# Patient Record
Sex: FEMALE | Race: BLACK OR AFRICAN AMERICAN | NOT HISPANIC OR LATINO | Employment: OTHER | URBAN - METROPOLITAN AREA
[De-identification: names, ages, dates, MRNs, and addresses within clinical notes are randomized per-mention and may not be internally consistent; named-entity substitution may affect disease eponyms.]

---

## 2017-04-12 ENCOUNTER — HISTORICAL (OUTPATIENT)
Dept: RADIOLOGY | Facility: HOSPITAL | Age: 72
End: 2017-04-12

## 2018-01-22 ENCOUNTER — HISTORICAL (OUTPATIENT)
Dept: ADMINISTRATIVE | Facility: HOSPITAL | Age: 73
End: 2018-01-22

## 2019-01-24 ENCOUNTER — HISTORICAL (OUTPATIENT)
Dept: RADIOLOGY | Facility: HOSPITAL | Age: 74
End: 2019-01-24

## 2019-01-24 ENCOUNTER — HISTORICAL (OUTPATIENT)
Dept: ADMINISTRATIVE | Facility: HOSPITAL | Age: 74
End: 2019-01-24

## 2019-01-24 LAB
ALBUMIN SERPL-MCNC: 4.5 G/DL (ref 3.5–4.8)
ALBUMIN/GLOB SERPL: 1.8 {RATIO} (ref 1.2–2.2)
ALP SERPL-CCNC: 59 IU/L (ref 39–117)
ALT SERPL-CCNC: 13 IU/L (ref 0–32)
AST SERPL-CCNC: 15 IU/L (ref 0–40)
BASOPHILS # BLD AUTO: 0 X10E3/UL (ref 0–0.2)
BASOPHILS NFR BLD AUTO: 0 %
BILIRUB SERPL-MCNC: 0.3 MG/DL (ref 0–1.2)
BUN SERPL-MCNC: 14 MG/DL (ref 8–27)
CALCIUM SERPL-MCNC: 10.4 MG/DL (ref 8.7–10.3)
CHLORIDE SERPL-SCNC: 104 MMOL/L (ref 96–106)
CHOLEST SERPL-MCNC: 122 MG/DL (ref 100–199)
CHOLEST/HDLC SERPL: 2.7 RATIO (ref 0–4.4)
CO2 SERPL-SCNC: 28 MMOL/L (ref 20–29)
CREAT SERPL-MCNC: 0.94 MG/DL (ref 0.57–1)
CREAT/UREA NIT SERPL: 15 (ref 12–28)
EOSINOPHIL # BLD AUTO: 0.1 X10E3/UL (ref 0–0.4)
EOSINOPHIL NFR BLD AUTO: 1 %
ERYTHROCYTE [DISTWIDTH] IN BLOOD BY AUTOMATED COUNT: 12.8 % (ref 12.3–15.4)
GLOBULIN SER-MCNC: 2.5 G/DL (ref 1.5–4.5)
GLUCOSE SERPL-MCNC: 158 MG/DL (ref 65–99)
HBA1C MFR BLD: 7.2 % (ref 4.8–5.6)
HCT VFR BLD AUTO: 39.2 % (ref 34–46.6)
HDLC SERPL-MCNC: 45 MG/DL
HGB BLD-MCNC: 12.7 G/DL (ref 11.1–15.9)
LDLC SERPL CALC-MCNC: 56 MG/DL (ref 0–99)
LYMPHOCYTES # BLD AUTO: 2.1 X10E3/UL (ref 0.7–3.1)
LYMPHOCYTES NFR BLD AUTO: 30 %
MCH RBC QN AUTO: 29.5 PG (ref 26.6–33)
MCHC RBC AUTO-ENTMCNC: 32.4 G/DL (ref 31.5–35.7)
MCV RBC AUTO: 91 FL (ref 79–97)
MONOCYTES # BLD AUTO: 0.4 X10E3/UL (ref 0.1–0.9)
MONOCYTES NFR BLD AUTO: 6 %
NEUTROPHILS # BLD AUTO: 4.5 X10E3/UL (ref 1.4–7)
NEUTROPHILS NFR BLD AUTO: 63 %
PLATELET # BLD AUTO: 313 X10E3/UL (ref 150–379)
POTASSIUM SERPL-SCNC: 4.9 MMOL/L (ref 3.5–5.2)
PROT SERPL-MCNC: 7 G/DL (ref 6–8.5)
RBC # BLD AUTO: 4.31 X10(6)/MCL (ref 3.77–5.28)
SODIUM SERPL-SCNC: 145 MMOL/L (ref 134–144)
TRIGL SERPL-MCNC: 107 MG/DL (ref 0–149)
URATE SERPL-MCNC: 5.4 MG/DL (ref 2.5–7.1)
VLDLC SERPL CALC-MCNC: 21 MG/DL (ref 5–40)
WBC # SPEC AUTO: 7.1 X10E3/UL (ref 3.4–10.8)

## 2019-08-06 ENCOUNTER — HISTORICAL (OUTPATIENT)
Dept: ADMINISTRATIVE | Facility: HOSPITAL | Age: 74
End: 2019-08-06

## 2019-08-06 LAB
ALBUMIN SERPL-MCNC: 5 G/DL (ref 3.5–4.8)
ALBUMIN/GLOB SERPL: 2 {RATIO} (ref 1.2–2.2)
ALP SERPL-CCNC: 70 IU/L (ref 39–117)
ALT SERPL-CCNC: 10 IU/L (ref 0–32)
AST SERPL-CCNC: 13 IU/L (ref 0–40)
BILIRUB SERPL-MCNC: 0.4 MG/DL (ref 0–1.2)
BUN SERPL-MCNC: 18 MG/DL (ref 8–27)
CALCIUM SERPL-MCNC: 10.3 MG/DL (ref 8.7–10.3)
CHLORIDE SERPL-SCNC: 102 MMOL/L (ref 96–106)
CO2 SERPL-SCNC: 27 MMOL/L (ref 20–29)
CREAT SERPL-MCNC: 0.85 MG/DL (ref 0.57–1)
CREAT/UREA NIT SERPL: 21 (ref 12–28)
GLOBULIN SER-MCNC: 2.5 G/DL (ref 1.5–4.5)
GLUCOSE SERPL-MCNC: 160 MG/DL (ref 65–99)
HBA1C MFR BLD: 7.8 % (ref 4.8–5.6)
MICROALBUMIN/CREAT RATIO PNL UR: <7.8 MG/G CREAT (ref 0–30)
POTASSIUM SERPL-SCNC: 4.4 MMOL/L (ref 3.5–5.2)
PROT SERPL-MCNC: 7.5 G/DL (ref 6–8.5)
SODIUM SERPL-SCNC: 145 MMOL/L (ref 134–144)

## 2019-08-26 ENCOUNTER — HISTORICAL (OUTPATIENT)
Dept: RADIOLOGY | Facility: HOSPITAL | Age: 74
End: 2019-08-26

## 2020-02-10 ENCOUNTER — HISTORICAL (OUTPATIENT)
Dept: ADMINISTRATIVE | Facility: HOSPITAL | Age: 75
End: 2020-02-10

## 2020-02-10 LAB
ALBUMIN SERPL-MCNC: 4.8 G/DL (ref 3.7–4.7)
ALBUMIN/GLOB SERPL: 2 {RATIO} (ref 1.2–2.2)
ALP SERPL-CCNC: 67 IU/L (ref 39–117)
ALT SERPL-CCNC: 14 IU/L (ref 0–32)
AST SERPL-CCNC: 15 IU/L (ref 0–40)
BILIRUB SERPL-MCNC: 0.3 MG/DL (ref 0–1.2)
BUN SERPL-MCNC: 12 MG/DL (ref 8–27)
CALCIUM SERPL-MCNC: 10.3 MG/DL (ref 8.7–10.3)
CHLORIDE SERPL-SCNC: 101 MMOL/L (ref 96–106)
CHOLEST SERPL-MCNC: 118 MG/DL (ref 100–199)
CHOLEST/HDLC SERPL: 2.4 RATIO (ref 0–4.4)
CO2 SERPL-SCNC: 21 MMOL/L (ref 20–29)
CREAT SERPL-MCNC: 0.86 MG/DL (ref 0.57–1)
CREAT/UREA NIT SERPL: 14 (ref 12–28)
GLOBULIN SER-MCNC: 2.4 G/DL (ref 1.5–4.5)
GLUCOSE SERPL-MCNC: 233 MG/DL (ref 65–99)
HBA1C MFR BLD: 7.9 % (ref 4.8–5.6)
HDLC SERPL-MCNC: 49 MG/DL
LDLC SERPL CALC-MCNC: 45 MG/DL (ref 0–99)
POTASSIUM SERPL-SCNC: 4.5 MMOL/L (ref 3.5–5.2)
PROT SERPL-MCNC: 7.2 G/DL (ref 6–8.5)
SODIUM SERPL-SCNC: 141 MMOL/L (ref 134–144)
TRIGL SERPL-MCNC: 122 MG/DL (ref 0–149)
VLDLC SERPL CALC-MCNC: 24 MG/DL (ref 5–40)

## 2020-08-11 ENCOUNTER — HISTORICAL (OUTPATIENT)
Dept: ADMINISTRATIVE | Facility: HOSPITAL | Age: 75
End: 2020-08-11

## 2020-08-11 LAB
ALBUMIN SERPL-MCNC: 4.8 G/DL (ref 3.7–4.7)
ALBUMIN/GLOB SERPL: 2 {RATIO} (ref 1.2–2.2)
ALP SERPL-CCNC: 62 IU/L (ref 39–117)
ALT SERPL-CCNC: 12 IU/L (ref 0–32)
AST SERPL-CCNC: 15 IU/L (ref 0–40)
BILIRUB SERPL-MCNC: 0.3 MG/DL (ref 0–1.2)
BUN SERPL-MCNC: 11 MG/DL (ref 8–27)
CALCIUM SERPL-MCNC: 10.3 MG/DL (ref 8.7–10.3)
CHLORIDE SERPL-SCNC: 101 MMOL/L (ref 96–106)
CO2 SERPL-SCNC: 24 MMOL/L (ref 20–29)
CREAT SERPL-MCNC: 0.88 MG/DL (ref 0.57–1)
CREAT/UREA NIT SERPL: 13 (ref 12–28)
GLOBULIN SER-MCNC: 2.4 G/DL (ref 1.5–4.5)
GLUCOSE SERPL-MCNC: 138 MG/DL (ref 65–99)
HBA1C MFR BLD: 7.5 % (ref 4.8–5.6)
POTASSIUM SERPL-SCNC: 4.6 MMOL/L (ref 3.5–5.2)
PROT SERPL-MCNC: 7.2 G/DL (ref 6–8.5)
SODIUM SERPL-SCNC: 140 MMOL/L (ref 134–144)

## 2020-11-12 ENCOUNTER — HISTORICAL (OUTPATIENT)
Dept: ADMINISTRATIVE | Facility: HOSPITAL | Age: 75
End: 2020-11-12

## 2020-11-12 LAB — HBA1C MFR BLD: 7 % (ref 4.8–5.6)

## 2020-12-04 LAB — CRC RECOMMENDATION EXT: NORMAL

## 2021-03-11 ENCOUNTER — HISTORICAL (OUTPATIENT)
Dept: ADMINISTRATIVE | Facility: HOSPITAL | Age: 76
End: 2021-03-11

## 2021-03-11 LAB
EST. AVERAGE GLUCOSE BLD GHB EST-MCNC: 177.2 MG/DL
HBA1C MFR BLD: 7.8 %

## 2021-06-14 ENCOUNTER — HISTORICAL (OUTPATIENT)
Dept: ADMINISTRATIVE | Facility: HOSPITAL | Age: 76
End: 2021-06-14

## 2021-06-14 LAB
BUN SERPL-MCNC: 12.4 MG/DL (ref 9.8–20.1)
CALCIUM SERPL-MCNC: 9.5 MG/DL (ref 8.4–10.2)
CHLORIDE SERPL-SCNC: 102 MMOL/L (ref 98–107)
CO2 SERPL-SCNC: 24 MMOL/L (ref 23–31)
CREAT SERPL-MCNC: 0.79 MG/DL (ref 0.55–1.02)
CREAT/UREA NIT SERPL: 16
EST. AVERAGE GLUCOSE BLD GHB EST-MCNC: 165.7 MG/DL
GLUCOSE SERPL-MCNC: 57 MG/DL (ref 82–115)
HBA1C MFR BLD: 7.4 %
POTASSIUM SERPL-SCNC: 3.9 MMOL/L (ref 3.5–5.1)
SODIUM SERPL-SCNC: 139 MMOL/L (ref 136–145)

## 2021-08-16 ENCOUNTER — HISTORICAL (OUTPATIENT)
Dept: ADMINISTRATIVE | Facility: HOSPITAL | Age: 76
End: 2021-08-16

## 2021-08-16 LAB
ALBUMIN SERPL-MCNC: 4.8 G/DL (ref 3.7–4.7)
ALBUMIN/GLOB SERPL: 1.9 {RATIO} (ref 1.2–2.2)
ALP SERPL-CCNC: 95 IU/L (ref 48–121)
ALT SERPL-CCNC: 46 IU/L (ref 0–32)
AST SERPL-CCNC: 20 IU/L (ref 0–40)
BILIRUB SERPL-MCNC: 0.2 MG/DL (ref 0–1.2)
BUN SERPL-MCNC: 11 MG/DL (ref 8–27)
CALCIUM SERPL-MCNC: 9.8 MG/DL (ref 8.7–10.3)
CHLORIDE SERPL-SCNC: 102 MMOL/L (ref 96–106)
CHOLEST SERPL-MCNC: 163 MG/DL (ref 100–199)
CHOLEST/HDLC SERPL: 3.3 RATIO (ref 0–4.4)
CO2 SERPL-SCNC: 22 MMOL/L (ref 20–29)
CREAT SERPL-MCNC: 0.8 MG/DL (ref 0.57–1)
CREAT/UREA NIT SERPL: 14 (ref 12–28)
GLOBULIN SER-MCNC: 2.5 G/DL (ref 1.5–4.5)
GLUCOSE SERPL-MCNC: 113 MG/DL (ref 65–99)
HDLC SERPL-MCNC: 50 MG/DL
LDLC SERPL CALC-MCNC: 78 MG/DL (ref 0–99)
POTASSIUM SERPL-SCNC: 4.8 MMOL/L (ref 3.5–5.2)
PROT SERPL-MCNC: 7.3 G/DL (ref 6–8.5)
SODIUM SERPL-SCNC: 140 MMOL/L (ref 134–144)
TRIGL SERPL-MCNC: 211 MG/DL (ref 0–149)
VLDLC SERPL CALC-MCNC: 35 MG/DL (ref 5–40)

## 2021-08-19 ENCOUNTER — HISTORICAL (OUTPATIENT)
Dept: RADIOLOGY | Facility: HOSPITAL | Age: 76
End: 2021-08-19

## 2021-09-02 ENCOUNTER — HISTORICAL (OUTPATIENT)
Dept: RADIOLOGY | Facility: HOSPITAL | Age: 76
End: 2021-09-02

## 2021-10-20 ENCOUNTER — HISTORICAL (OUTPATIENT)
Dept: ADMINISTRATIVE | Facility: HOSPITAL | Age: 76
End: 2021-10-20

## 2021-10-20 LAB
ALBUMIN SERPL-MCNC: 4.7 G/DL (ref 3.7–4.7)
ALBUMIN/GLOB SERPL: 2 {RATIO} (ref 1.2–2.2)
ALP SERPL-CCNC: 69 IU/L (ref 44–121)
ALT SERPL-CCNC: 18 IU/L (ref 0–32)
AST SERPL-CCNC: 15 IU/L (ref 0–40)
BASOPHILS # BLD AUTO: 0 X10E3/UL (ref 0–0.2)
BASOPHILS NFR BLD AUTO: 1 %
BILIRUB SERPL-MCNC: 0.2 MG/DL (ref 0–1.2)
BUN SERPL-MCNC: 15 MG/DL (ref 8–27)
CALCIUM SERPL-MCNC: 10.1 MG/DL (ref 8.7–10.3)
CHLORIDE SERPL-SCNC: 102 MMOL/L (ref 96–106)
CO2 SERPL-SCNC: 22 MMOL/L (ref 20–29)
CREAT SERPL-MCNC: 0.85 MG/DL (ref 0.57–1)
CREAT/UREA NIT SERPL: 18 (ref 12–28)
EOSINOPHIL # BLD AUTO: 0.1 X10E3/UL (ref 0–0.4)
EOSINOPHIL NFR BLD AUTO: 1 %
ERYTHROCYTE [DISTWIDTH] IN BLOOD BY AUTOMATED COUNT: 13.3 % (ref 11.7–15.4)
GLOBULIN SER-MCNC: 2.4 G/DL (ref 1.5–4.5)
GLUCOSE SERPL-MCNC: 152 MG/DL (ref 65–99)
HCT VFR BLD AUTO: 38.5 % (ref 34–46.6)
HGB BLD-MCNC: 12.7 G/DL (ref 11.1–15.9)
LYMPHOCYTES # BLD AUTO: 2.5 X10E3/UL (ref 0.7–3.1)
LYMPHOCYTES NFR BLD AUTO: 39 %
MCH RBC QN AUTO: 29.7 PG (ref 26.6–33)
MCHC RBC AUTO-ENTMCNC: 33 G/DL (ref 31.5–35.7)
MCV RBC AUTO: 90 FL (ref 79–97)
MONOCYTES # BLD AUTO: 0.3 X10E3/UL (ref 0.1–0.9)
MONOCYTES NFR BLD AUTO: 5 %
NEUTROPHILS # BLD AUTO: 3.6 X10E3/UL (ref 1.4–7)
NEUTROPHILS NFR BLD AUTO: 54 %
PLATELET # BLD AUTO: 283 X10E3/UL (ref 150–450)
POTASSIUM SERPL-SCNC: 4.4 MMOL/L (ref 3.5–5.2)
PROT SERPL-MCNC: 7.1 G/DL (ref 6–8.5)
RBC # BLD AUTO: 4.27 X10(6)/MCL (ref 3.77–5.28)
SODIUM SERPL-SCNC: 140 MMOL/L (ref 134–144)
TSH SERPL-ACNC: 2.03 MIU/ML (ref 0.45–4.5)
WBC # SPEC AUTO: 6.5 X10E3/UL (ref 3.4–10.8)

## 2021-10-29 ENCOUNTER — HISTORICAL (OUTPATIENT)
Dept: RADIOLOGY | Facility: HOSPITAL | Age: 76
End: 2021-10-29

## 2022-01-11 ENCOUNTER — HISTORICAL (OUTPATIENT)
Dept: ADMINISTRATIVE | Facility: HOSPITAL | Age: 77
End: 2022-01-11

## 2022-01-11 LAB — HBA1C MFR BLD: 8 % (ref 4.8–5.6)

## 2022-02-16 ENCOUNTER — HISTORICAL (OUTPATIENT)
Dept: LAB | Facility: HOSPITAL | Age: 77
End: 2022-02-16

## 2022-02-16 LAB
ALBUMIN SERPL-MCNC: 4.2 G/DL (ref 3.4–4.8)
ALP SERPL-CCNC: 66 U/L (ref 40–150)
ALT SERPL-CCNC: 23 U/L (ref 0–55)
AST SERPL-CCNC: 15 U/L (ref 5–34)
BILIRUB SERPL-MCNC: 0.4 MG/DL
BILIRUBIN DIRECT+TOT PNL SERPL-MCNC: 0.1 (ref 0–0.5)
BILIRUBIN DIRECT+TOT PNL SERPL-MCNC: 0.3 (ref 0–0.8)
CHOLEST SERPL-MCNC: 147 MG/DL
CHOLEST/HDLC SERPL: 3 {RATIO} (ref 0–5)
HDLC SERPL-MCNC: 49 MG/DL (ref 35–60)
HEMOLYSIS INTERF INDEX SERPL-ACNC: 5
ICTERIC INTERF INDEX SERPL-ACNC: 0
LDLC SERPL CALC-MCNC: 61 MG/DL (ref 50–140)
LIPEMIC INTERF INDEX SERPL-ACNC: 1
PROT SERPL-MCNC: 7.4 G/DL (ref 5.8–7.6)
TRIGL SERPL-MCNC: 185 MG/DL (ref 37–140)
VLDLC SERPL CALC-MCNC: 37 MG/DL

## 2022-04-10 ENCOUNTER — HISTORICAL (OUTPATIENT)
Dept: ADMINISTRATIVE | Facility: HOSPITAL | Age: 77
End: 2022-04-10
Payer: MEDICARE

## 2022-04-29 VITALS
OXYGEN SATURATION: 98 % | BODY MASS INDEX: 30.09 KG/M2 | WEIGHT: 159.38 LBS | DIASTOLIC BLOOD PRESSURE: 78 MMHG | SYSTOLIC BLOOD PRESSURE: 132 MMHG | HEIGHT: 61 IN

## 2022-06-06 PROBLEM — U07.1 COVID-19: Status: ACTIVE | Noted: 2020-07-08

## 2022-06-06 PROBLEM — E78.00 HYPERCHOLESTEROLEMIA: Status: ACTIVE | Noted: 2022-06-06

## 2022-06-06 PROBLEM — I10 HYPERTENSION: Status: ACTIVE | Noted: 2022-06-06

## 2022-06-06 PROBLEM — M81.0 OSTEOPOROSIS: Status: ACTIVE | Noted: 2022-06-06

## 2022-06-15 ENCOUNTER — DOCUMENTATION ONLY (OUTPATIENT)
Dept: ADMINISTRATIVE | Facility: HOSPITAL | Age: 77
End: 2022-06-15
Payer: MEDICARE

## 2022-08-16 ENCOUNTER — OFFICE VISIT (OUTPATIENT)
Dept: NEUROLOGY | Facility: CLINIC | Age: 77
End: 2022-08-16
Payer: MEDICARE

## 2022-08-16 ENCOUNTER — TELEPHONE (OUTPATIENT)
Dept: PRIMARY CARE CLINIC | Facility: CLINIC | Age: 77
End: 2022-08-16
Payer: MEDICARE

## 2022-08-16 VITALS
DIASTOLIC BLOOD PRESSURE: 80 MMHG | HEART RATE: 70 BPM | SYSTOLIC BLOOD PRESSURE: 124 MMHG | HEIGHT: 61 IN | BODY MASS INDEX: 31.68 KG/M2 | WEIGHT: 167.81 LBS

## 2022-08-16 DIAGNOSIS — F03.90 DEMENTIA WITHOUT BEHAVIORAL DISTURBANCE, UNSPECIFIED DEMENTIA TYPE: ICD-10-CM

## 2022-08-16 PROBLEM — R41.3 POOR SHORT TERM MEMORY: Status: ACTIVE | Noted: 2022-08-16

## 2022-08-16 PROCEDURE — 99204 PR OFFICE/OUTPT VISIT, NEW, LEVL IV, 45-59 MIN: ICD-10-PCS | Mod: S$GLB,,, | Performed by: NURSE PRACTITIONER

## 2022-08-16 PROCEDURE — 3074F SYST BP LT 130 MM HG: CPT | Mod: CPTII,S$GLB,, | Performed by: NURSE PRACTITIONER

## 2022-08-16 PROCEDURE — 3074F PR MOST RECENT SYSTOLIC BLOOD PRESSURE < 130 MM HG: ICD-10-PCS | Mod: CPTII,S$GLB,, | Performed by: NURSE PRACTITIONER

## 2022-08-16 PROCEDURE — 99999 PR PBB SHADOW E&M-EST. PATIENT-LVL III: ICD-10-PCS | Mod: PBBFAC,,, | Performed by: PSYCHIATRY & NEUROLOGY

## 2022-08-16 PROCEDURE — 3288F PR FALLS RISK ASSESSMENT DOCUMENTED: ICD-10-PCS | Mod: CPTII,S$GLB,, | Performed by: NURSE PRACTITIONER

## 2022-08-16 PROCEDURE — 1159F MED LIST DOCD IN RCRD: CPT | Mod: CPTII,S$GLB,, | Performed by: NURSE PRACTITIONER

## 2022-08-16 PROCEDURE — 3079F DIAST BP 80-89 MM HG: CPT | Mod: CPTII,S$GLB,, | Performed by: NURSE PRACTITIONER

## 2022-08-16 PROCEDURE — 3288F FALL RISK ASSESSMENT DOCD: CPT | Mod: CPTII,S$GLB,, | Performed by: NURSE PRACTITIONER

## 2022-08-16 PROCEDURE — 1159F PR MEDICATION LIST DOCUMENTED IN MEDICAL RECORD: ICD-10-PCS | Mod: CPTII,S$GLB,, | Performed by: NURSE PRACTITIONER

## 2022-08-16 PROCEDURE — 99999 PR PBB SHADOW E&M-EST. PATIENT-LVL III: CPT | Mod: PBBFAC,,, | Performed by: PSYCHIATRY & NEUROLOGY

## 2022-08-16 PROCEDURE — 1160F PR REVIEW ALL MEDS BY PRESCRIBER/CLIN PHARMACIST DOCUMENTED: ICD-10-PCS | Mod: CPTII,S$GLB,, | Performed by: NURSE PRACTITIONER

## 2022-08-16 PROCEDURE — 1160F RVW MEDS BY RX/DR IN RCRD: CPT | Mod: CPTII,S$GLB,, | Performed by: NURSE PRACTITIONER

## 2022-08-16 PROCEDURE — 1101F PT FALLS ASSESS-DOCD LE1/YR: CPT | Mod: CPTII,S$GLB,, | Performed by: NURSE PRACTITIONER

## 2022-08-16 PROCEDURE — 1101F PR PT FALLS ASSESS DOC 0-1 FALLS W/OUT INJ PAST YR: ICD-10-PCS | Mod: CPTII,S$GLB,, | Performed by: NURSE PRACTITIONER

## 2022-08-16 PROCEDURE — 3079F PR MOST RECENT DIASTOLIC BLOOD PRESSURE 80-89 MM HG: ICD-10-PCS | Mod: CPTII,S$GLB,, | Performed by: NURSE PRACTITIONER

## 2022-08-16 PROCEDURE — 99204 OFFICE O/P NEW MOD 45 MIN: CPT | Mod: S$GLB,,, | Performed by: NURSE PRACTITIONER

## 2022-08-16 RX ORDER — DONEPEZIL HYDROCHLORIDE 5 MG/1
TABLET, FILM COATED ORAL
Qty: 30 TABLET | Refills: 5 | Status: SHIPPED | OUTPATIENT
Start: 2022-08-16 | End: 2022-10-24

## 2022-08-16 RX ORDER — ROSUVASTATIN CALCIUM 20 MG/1
20 TABLET, COATED ORAL DAILY
COMMUNITY
Start: 2022-03-23 | End: 2024-02-28 | Stop reason: SDUPTHER

## 2022-08-16 RX ORDER — AMLODIPINE BESYLATE 10 MG/1
10 TABLET ORAL DAILY
COMMUNITY
Start: 2022-04-28 | End: 2024-02-28 | Stop reason: SDUPTHER

## 2022-08-16 RX ORDER — METFORMIN HYDROCHLORIDE 850 MG/1
1 TABLET ORAL 2 TIMES DAILY
COMMUNITY
Start: 2021-11-26 | End: 2022-09-12

## 2022-08-16 NOTE — ASSESSMENT & PLAN NOTE
Start aricept  Agree with resuming B12 supplementation  Diet, exercise, and social engagement encourage  Recommend she retire from driving which was discussed

## 2022-08-16 NOTE — LETTER
August 16, 2022    Demetrice Barrett  [address] [address]             [facility] [address], [address]  [facility] [address]  Phone: [phone] Dear Ms. Barrett:    It is my medical recommendation that Ms. Barrett retired from driving due to her cognitive impairment.    If you have any questions or concerns, please don't hesitate to call. [phone]    Sincerely,        Paty Marcelino MD

## 2022-08-16 NOTE — TELEPHONE ENCOUNTER
----- Message from Perry Crouch sent at 8/16/2022  1:59 PM CDT -----  .Type:  Needs Medical Advice    Who Called: pt's Daughter  Would the patient rather a call back or a response via [facility]? Call back  Best Call Back Number: [phone]  Additional Information: call back to discuss to verify some information,
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
## 2022-08-17 ENCOUNTER — OFFICE VISIT (OUTPATIENT)
Dept: PRIMARY CARE CLINIC | Facility: CLINIC | Age: 77
End: 2022-08-17
Payer: MEDICARE

## 2022-08-17 VITALS
BODY MASS INDEX: 30.96 KG/M2 | DIASTOLIC BLOOD PRESSURE: 70 MMHG | OXYGEN SATURATION: 98 % | SYSTOLIC BLOOD PRESSURE: 110 MMHG | WEIGHT: 164 LBS | RESPIRATION RATE: 16 BRPM | HEIGHT: 61 IN | TEMPERATURE: 98 F | HEART RATE: 81 BPM

## 2022-08-17 DIAGNOSIS — M81.0 AGE-RELATED OSTEOPOROSIS WITHOUT CURRENT PATHOLOGICAL FRACTURE: ICD-10-CM

## 2022-08-17 DIAGNOSIS — I10 ESSENTIAL HYPERTENSION: ICD-10-CM

## 2022-08-17 DIAGNOSIS — Z71.89 ADVANCE CARE PLANNING: ICD-10-CM

## 2022-08-17 DIAGNOSIS — E11.65 UNCONTROLLED TYPE 2 DIABETES MELLITUS WITH HYPERGLYCEMIA: ICD-10-CM

## 2022-08-17 DIAGNOSIS — R53.82 CHRONIC FATIGUE: ICD-10-CM

## 2022-08-17 DIAGNOSIS — Z00.00 MEDICARE ANNUAL WELLNESS VISIT, SUBSEQUENT: Primary | ICD-10-CM

## 2022-08-17 DIAGNOSIS — R82.81 PYURIA: ICD-10-CM

## 2022-08-17 DIAGNOSIS — E78.00 HYPERCHOLESTEROLEMIA: ICD-10-CM

## 2022-08-17 PROBLEM — U07.1 COVID-19: Status: RESOLVED | Noted: 2020-07-08 | Resolved: 2022-08-17

## 2022-08-17 LAB
ALBUMIN SERPL-MCNC: 4.1 GM/DL (ref 3.4–4.8)
ALBUMIN/GLOB SERPL: 1.4 RATIO (ref 1.1–2)
ALP SERPL-CCNC: 65 UNIT/L (ref 40–150)
ALT SERPL-CCNC: 12 UNIT/L (ref 0–55)
APPEARANCE UR: ABNORMAL
AST SERPL-CCNC: 14 UNIT/L (ref 5–34)
BACTERIA #/AREA URNS AUTO: ABNORMAL /HPF
BASOPHILS # BLD AUTO: 0.02 X10(3)/MCL (ref 0–0.2)
BASOPHILS NFR BLD AUTO: 0.3 %
BILIRUB UR QL STRIP.AUTO: NEGATIVE MG/DL
BILIRUBIN DIRECT+TOT PNL SERPL-MCNC: 0.4 MG/DL
BUN SERPL-MCNC: 14.8 MG/DL (ref 9.8–20.1)
CALCIUM SERPL-MCNC: 9.8 MG/DL (ref 8.4–10.2)
CHLORIDE SERPL-SCNC: 103 MMOL/L (ref 98–107)
CO2 SERPL-SCNC: 27 MMOL/L (ref 23–31)
COLOR UR AUTO: YELLOW
CREAT SERPL-MCNC: 0.84 MG/DL (ref 0.55–1.02)
EOSINOPHIL # BLD AUTO: 0.04 X10(3)/MCL (ref 0–0.9)
EOSINOPHIL NFR BLD AUTO: 0.5 %
ERYTHROCYTE [DISTWIDTH] IN BLOOD BY AUTOMATED COUNT: 13.2 % (ref 11.5–17)
EST. AVERAGE GLUCOSE BLD GHB EST-MCNC: 177.2 MG/DL
GFR SERPLBLD CREATININE-BSD FMLA CKD-EPI: >60 MLS/MIN/1.73/M2
GLOBULIN SER-MCNC: 3 GM/DL (ref 2.4–3.5)
GLUCOSE SERPL-MCNC: 111 MG/DL (ref 82–115)
GLUCOSE UR QL STRIP.AUTO: NEGATIVE MG/DL
HBA1C MFR BLD: 7.8 %
HCT VFR BLD AUTO: 37.6 % (ref 37–47)
HGB BLD-MCNC: 12.3 GM/DL (ref 12–16)
HYALINE CASTS URNS QL MICRO: ABNORMAL /HPF
IMM GRANULOCYTES # BLD AUTO: 0.01 X10(3)/MCL (ref 0–0.04)
IMM GRANULOCYTES NFR BLD AUTO: 0.1 %
KETONES UR QL STRIP.AUTO: ABNORMAL MG/DL
LEUKOCYTE ESTERASE UR QL STRIP.AUTO: ABNORMAL UNIT/L
LYMPHOCYTES # BLD AUTO: 2.36 X10(3)/MCL (ref 0.6–4.6)
LYMPHOCYTES NFR BLD AUTO: 30.3 %
MCH RBC QN AUTO: 29.4 PG (ref 27–31)
MCHC RBC AUTO-ENTMCNC: 32.7 MG/DL (ref 33–36)
MCV RBC AUTO: 90 FL (ref 80–94)
MONOCYTES # BLD AUTO: 0.37 X10(3)/MCL (ref 0.1–1.3)
MONOCYTES NFR BLD AUTO: 4.7 %
MUCOUS THREADS URNS QL MICRO: ABNORMAL /LPF
NEUTROPHILS # BLD AUTO: 5 X10(3)/MCL (ref 2.1–9.2)
NEUTROPHILS NFR BLD AUTO: 64.1 %
NITRITE UR QL STRIP.AUTO: NEGATIVE
PH UR STRIP.AUTO: 6 [PH]
PLATELET # BLD AUTO: 285 X10(3)/MCL (ref 130–400)
PMV BLD AUTO: 10.4 FL (ref 7.4–10.4)
POTASSIUM SERPL-SCNC: 4.1 MMOL/L (ref 3.5–5.1)
PROT SERPL-MCNC: 7.1 GM/DL (ref 5.8–7.6)
PROT UR QL STRIP.AUTO: NEGATIVE MG/DL
RBC # BLD AUTO: 4.18 X10(6)/MCL (ref 4.2–5.4)
RBC #/AREA URNS AUTO: ABNORMAL /HPF
RBC UR QL AUTO: NEGATIVE UNIT/L
SODIUM SERPL-SCNC: 140 MMOL/L (ref 136–145)
SP GR UR STRIP.AUTO: 1.02
SQUAMOUS #/AREA URNS AUTO: ABNORMAL /HPF
UROBILINOGEN UR STRIP-ACNC: 0.2 MG/DL
WBC # SPEC AUTO: 7.8 X10(3)/MCL (ref 4.5–11.5)
WBC #/AREA URNS AUTO: ABNORMAL /HPF

## 2022-08-17 PROCEDURE — 3078F DIAST BP <80 MM HG: CPT | Mod: CPTII,,, | Performed by: INTERNAL MEDICINE

## 2022-08-17 PROCEDURE — 82043 UR ALBUMIN QUANTITATIVE: CPT | Performed by: INTERNAL MEDICINE

## 2022-08-17 PROCEDURE — 3074F SYST BP LT 130 MM HG: CPT | Mod: CPTII,,, | Performed by: INTERNAL MEDICINE

## 2022-08-17 PROCEDURE — 3288F PR FALLS RISK ASSESSMENT DOCUMENTED: ICD-10-PCS | Mod: CPTII,,, | Performed by: INTERNAL MEDICINE

## 2022-08-17 PROCEDURE — 1159F PR MEDICATION LIST DOCUMENTED IN MEDICAL RECORD: ICD-10-PCS | Mod: CPTII,,, | Performed by: INTERNAL MEDICINE

## 2022-08-17 PROCEDURE — 3074F PR MOST RECENT SYSTOLIC BLOOD PRESSURE < 130 MM HG: ICD-10-PCS | Mod: CPTII,,, | Performed by: INTERNAL MEDICINE

## 2022-08-17 PROCEDURE — 81001 URINALYSIS AUTO W/SCOPE: CPT | Performed by: INTERNAL MEDICINE

## 2022-08-17 PROCEDURE — 36415 COLL VENOUS BLD VENIPUNCTURE: CPT | Mod: ,,, | Performed by: INTERNAL MEDICINE

## 2022-08-17 PROCEDURE — 1101F PR PT FALLS ASSESS DOC 0-1 FALLS W/OUT INJ PAST YR: ICD-10-PCS | Mod: CPTII,,, | Performed by: INTERNAL MEDICINE

## 2022-08-17 PROCEDURE — 1158F ADVNC CARE PLAN TLK DOCD: CPT | Mod: CPTII,,, | Performed by: INTERNAL MEDICINE

## 2022-08-17 PROCEDURE — 36415 COLL VENOUS BLD VENIPUNCTURE: CPT | Performed by: INTERNAL MEDICINE

## 2022-08-17 PROCEDURE — 1160F PR REVIEW ALL MEDS BY PRESCRIBER/CLIN PHARMACIST DOCUMENTED: ICD-10-PCS | Mod: CPTII,,, | Performed by: INTERNAL MEDICINE

## 2022-08-17 PROCEDURE — 80053 COMPREHEN METABOLIC PANEL: CPT | Performed by: INTERNAL MEDICINE

## 2022-08-17 PROCEDURE — 3078F PR MOST RECENT DIASTOLIC BLOOD PRESSURE < 80 MM HG: ICD-10-PCS | Mod: CPTII,,, | Performed by: INTERNAL MEDICINE

## 2022-08-17 PROCEDURE — G0439 PPPS, SUBSEQ VISIT: HCPCS | Mod: ,,, | Performed by: INTERNAL MEDICINE

## 2022-08-17 PROCEDURE — G0439 PR MEDICARE ANNUAL WELLNESS SUBSEQUENT VISIT: ICD-10-PCS | Mod: ,,, | Performed by: INTERNAL MEDICINE

## 2022-08-17 PROCEDURE — 85025 COMPLETE CBC W/AUTO DIFF WBC: CPT | Performed by: INTERNAL MEDICINE

## 2022-08-17 PROCEDURE — 83036 HEMOGLOBIN GLYCOSYLATED A1C: CPT | Performed by: INTERNAL MEDICINE

## 2022-08-17 PROCEDURE — 1160F RVW MEDS BY RX/DR IN RCRD: CPT | Mod: CPTII,,, | Performed by: INTERNAL MEDICINE

## 2022-08-17 PROCEDURE — 1101F PT FALLS ASSESS-DOCD LE1/YR: CPT | Mod: CPTII,,, | Performed by: INTERNAL MEDICINE

## 2022-08-17 PROCEDURE — 1159F MED LIST DOCD IN RCRD: CPT | Mod: CPTII,,, | Performed by: INTERNAL MEDICINE

## 2022-08-17 PROCEDURE — 1125F PR PAIN SEVERITY QUANTIFIED, PAIN PRESENT: ICD-10-PCS | Mod: CPTII,,, | Performed by: INTERNAL MEDICINE

## 2022-08-17 PROCEDURE — 3288F FALL RISK ASSESSMENT DOCD: CPT | Mod: CPTII,,, | Performed by: INTERNAL MEDICINE

## 2022-08-17 PROCEDURE — 36415 PR COLLECTION VENOUS BLOOD,VENIPUNCTURE: ICD-10-PCS | Mod: ,,, | Performed by: INTERNAL MEDICINE

## 2022-08-17 PROCEDURE — 1125F AMNT PAIN NOTED PAIN PRSNT: CPT | Mod: CPTII,,, | Performed by: INTERNAL MEDICINE

## 2022-08-17 PROCEDURE — 1158F PR ADVANCE CARE PLANNING DISCUSS DOCUMENTED IN MEDICAL RECORD: ICD-10-PCS | Mod: CPTII,,, | Performed by: INTERNAL MEDICINE

## 2022-08-17 NOTE — PROGRESS NOTES
Patient ID: [id number]     Chief Complaint: Wellness exam (Discuss visit post seeing dr Moreno. C/O pressure behind head)      HPI:     Demetrice Barrett is a 77 y.o. female here today for a Medicare Wellness. No other complaints today. She had a visit with neurology yesterday.       ----------------------------  COVID-19  Diabetes mellitus type II, uncontrolled  HTN (hypertension)  Hypercholesterolemia  Internal hemorrhoids  Obesity  Tubular adenoma of colon     Past Surgical History:   Procedure Laterality Date    COLONOSCOPY N/A 12/04/2020    COLONOSCOPY W/ BIOPSIES AND POLYPECTOMY      TOTAL ABDOMINAL HYSTERECTOMY         Review of patient's allergies indicates:  No Known Allergies    Outpatient Medications Marked as Taking for the 8/17/22 encounter (Office Visit) with Naz Lugo MD   Medication Sig Dispense Refill    alendronate (FOSAMAX) 70 MG tablet TAKE 1 TABLET EVERY WEEK AS DIRECTED;  SEE PACKAGE FOR ADDITIONAL INSTRUCTIONS 12 tablet 1    amLODIPine (NORVASC) 10 MG tablet Take 10 mg by mouth once daily at 6am.      enalapril (VASOTEC) 20 MG tablet TAKE 1 TABLET EVERY DAY 90 tablet 1    glipiZIDE (GLUCOTROL) 5 MG tablet TAKE 1 TABLET TWICE DAILY 180 tablet 1    metFORMIN (GLUCOPHAGE) 850 MG tablet Take 1 tablet by mouth 2 (two) times a day.      pioglitazone (ACTOS) 30 MG tablet TAKE 1 TABLET EVERY DAY 90 tablet 1    rosuvastatin (CRESTOR) 20 MG tablet Take 20 mg by mouth once daily at 6am.      TRUEPLUS LANCETS 33 gauge Misc TEST BLOOD SUGAR TWICE DAILY 200 each 3       Social History     Socioeconomic History    Marital status:    Tobacco Use    Smoking status: Never Smoker    Smokeless tobacco: Never Used   Substance and Sexual Activity    Alcohol use: Never    Drug use: Never        Family History   Problem Relation Age of Onset    Hyperlipidemia Mother     Hypertension Mother     Kidney disease Mother     Gout Mother     Stroke Sister     Hypertension Sister      "Diabetes Sister     Heart disease Sister     Anemia Sister     Kidney disease Sister         Patient Care Team:  Naz Lugo MD as PCP - General (Internal Medicine)       Subjective:     Review of Systems   Constitutional: Negative.    HENT: Negative.    Eyes: Negative.    Respiratory: Negative.    Cardiovascular: Negative.    Gastrointestinal: Negative.    Genitourinary: Negative.    Musculoskeletal:        Back is doing ok but has pain along scalp. Does sit playing on her phone a lot in the day   Skin: Negative.    Neurological:        Diagnosis with neurology is for dementia and she was given recommendations not to drive, Demetrice already doesn't want to drive after getting lost that time.    Endo/Heme/Allergies:        Sugars are mostly good, daughter says rarely high   Psychiatric/Behavioral:        Daughter concerned that she lost her mother and daughter she was close to last year along with stress with her spouse so she thinks grief/depression may be part of the issue, before starting medication she is wanting to see psychiatry The NP said she would set that up.         Patient Reported Health Risk Assessment       Objective:     /70 (BP Location: Left arm)   Pulse 81   Temp 98.4 °F (36.9 °C)   Resp 16   Ht 5' 1" (1.549 m)   Wt 74.4 kg (164 lb)   SpO2 98%   BMI 30.99 kg/m²     Physical Exam  Vitals reviewed.   Constitutional:       Appearance: Normal appearance. She is obese.   HENT:      Head: Normocephalic and atraumatic.      Right Ear: Tympanic membrane, ear canal and external ear normal.      Left Ear: Tympanic membrane, ear canal and external ear normal.      Mouth/Throat:      Mouth: Mucous membranes are moist.   Eyes:      Extraocular Movements: Extraocular movements intact.      Pupils: Pupils are equal, round, and reactive to light.   Cardiovascular:      Rate and Rhythm: Normal rate and regular rhythm.      Pulses:           Dorsalis pedis pulses are 3+ on the right side and 3+ on " the left side.        Posterior tibial pulses are 3+ on the right side and 3+ on the left side.      Heart sounds:     No gallop.   Pulmonary:      Effort: Pulmonary effort is normal.      Breath sounds: Normal breath sounds.   Abdominal:      General: Abdomen is flat. Bowel sounds are normal.      Palpations: Abdomen is soft.      Tenderness: There is no abdominal tenderness. There is no guarding.   Musculoskeletal:         General: No swelling. Normal range of motion.      Cervical back: Normal range of motion and neck supple.      Right foot: Deformity present.      Left foot: Deformity present.   Feet:      Right foot:      Protective Sensation: 10 sites tested. 10 sites sensed.      Skin integrity: Skin integrity normal.      Toenail Condition: Right toenails are normal.      Left foot:      Protective Sensation: 10 sites tested. 10 sites sensed.      Skin integrity: Skin integrity normal.      Toenail Condition: Left toenails are normal.      Comments: Pes planus  Skin:     General: Skin is warm and dry.   Neurological:      General: No focal deficit present.      Mental Status: She is alert and oriented to person, place, and time.      Gait: Gait normal.   Psychiatric:         Mood and Affect: Mood normal.         Behavior: Behavior normal.         Thought Content: Thought content normal.         Judgment: Judgment normal.           No flowsheet data found.  Fall Risk Assessment - Outpatient 8/17/2022 8/16/2022   Mobility Status - Ambulatory   Number of falls 0 0   Identified as fall risk 0 0              Documentation Only on 06/15/2022   Component Date Value    CRC Recommendation Exter* 12/04/2020 No repeat colonoscopy    Historical on 02/16/2022   Component Date Value    Cholesterol Total 02/16/2022 147     HDL Cholesterol 02/16/2022 49     Triglyceride 02/16/2022 185     Very Low Density Lipopro* 02/16/2022 37     Cholesterol/HDL Ratio 02/16/2022 3     LDL Cholesterol 02/16/2022 61.00     Albumin  Level 02/16/2022 4.2     Alkaline Phosphatase 02/16/2022 66     Alanine Aminotransferase 02/16/2022 23     Aspartate Aminotransfera* 02/16/2022 15     Bilirubin Direct 02/16/2022 0.1     Bilirubin Total 02/16/2022 0.4     Bilirubin Indirect 02/16/2022 0.30     Protein Total 02/16/2022 7.4     Hemolysis 02/16/2022 5     Icterus 02/16/2022 0     Lipemia 02/16/2022 1           Assessment:       ICD-10-CM ICD-9-CM   1. Medicare annual wellness visit, subsequent  Z00.00 V70.0   2. Uncontrolled type 2 diabetes mellitus with hyperglycemia  E11.65 250.02   3. Hypercholesterolemia  E78.00 272.0   4. Essential hypertension  I10 401.9   5. Age-related osteoporosis without current pathological fracture  M81.0 733.01   6. Chronic fatigue  R53.82 780.79   7. Advance care planning  Z71.89 V65.49        Plan:     Medicare Annual Wellness and Personalized Prevention Plan:   Fall Risk + Home Safety + Hearing Impairment + Depression Screen + Cognitive Impairment Screen + Health Risk Assessment all reviewed.       Health Maintenance Topics with due status: Not Due       Topic Last Completion Date    Influenza Vaccine 10/20/2010    Lipid Panel 02/16/2022      The patient's Health Maintenance was reviewed and the following appears to be due at this time:   Health Maintenance Due   Topic Date Due    Hepatitis C Screening  Never done    Pneumococcal Vaccines (Age 65+) (1 - PCV) Never done    Foot Exam  Never done    Eye Exam  Never done    TETANUS VACCINE  Never done    DEXA Scan  Never done    Shingles Vaccine (1 of 2) Never done    Diabetes Urine Screening  08/06/2020    COVID-19 Vaccine (2 - Moderna series) 09/08/2021    Hemoglobin A1c  04/11/2022         Demetrice was seen today for wellness exam.    Diagnoses and all orders for this visit:    Medicare annual wellness visit, subsequent    Uncontrolled type 2 diabetes mellitus with hyperglycemia  -     Comprehensive Metabolic Panel; Future  -     Hemoglobin A1C;  Future  -     Urinalysis, Reflex to Urine Culture Urine, Clean Catch; Future  -     Microalbumin/Creatinine Ratio, Urine    Hypercholesterolemia    Essential hypertension    Age-related osteoporosis without current pathological fracture  -     CBC Auto Differential; Future    Chronic fatigue  -     CBC Auto Differential; Future    Advance care planning        Advance Care Planning   I attest to discussing Advance Care Planning with patient and/or family member.  Education was provided including the importance of the Health Care Power of , Advance Directives, and/or [place] documentation.  The patient expressed understanding to the importance of this information and discussion. No living will or advanced directive, she has three daughters and there is concern they would not agree. She does not want machines to prolong her life. Gave handout from [facility] and discussed forms it has.   Length of ACP conversation in minutes: 16 min         Medication List with Changes/Refills   Current Medications    ALENDRONATE (FOSAMAX) 70 MG TABLET    TAKE 1 TABLET EVERY WEEK AS DIRECTED;  SEE PACKAGE FOR ADDITIONAL INSTRUCTIONS       Start Date: 5/6/2022  End Date: --    AMLODIPINE (NORVASC) 10 MG TABLET    Take 10 mg by mouth once daily at 6am.       Start Date: 4/28/2022 End Date: --    DONEPEZIL (ARICEPT) 5 MG TABLET    5 mg PO q PM x 1 month, then increase to 5 mg BID       Start Date: 8/16/2022 End Date: --    ENALAPRIL (VASOTEC) 20 MG TABLET    TAKE 1 TABLET EVERY DAY       Start Date: 6/6/2022  End Date: --    GLIPIZIDE (GLUCOTROL) 5 MG TABLET    TAKE 1 TABLET TWICE DAILY       Start Date: 6/6/2022  End Date: --    METFORMIN (GLUCOPHAGE) 850 MG TABLET    Take 1 tablet by mouth 2 (two) times a day.       Start Date: 11/26/2021End Date: --    PIOGLITAZONE (ACTOS) 30 MG TABLET    TAKE 1 TABLET EVERY DAY       Start Date: 6/6/2022  End Date: --    ROSUVASTATIN (CRESTOR) 20 MG TABLET    Take 20 mg by mouth once daily at 6am.        Start Date: 3/23/2022 End Date: --    TRUEPLUS LANCETS 33 GAUGE MISC    TEST BLOOD SUGAR TWICE DAILY       Start Date: 7/4/2022  End Date: --   Advance Care Planning     Date: 08/17/2022    [facility]  I engaged the family in a conversation about advance care planning and we specifically addressed what the goals of care would be moving forward, in light of the patient's change in clinical status, specifically age and cognitive decline.  We did specifically address the patient's likely prognosis, which is fair .  We explored the patient's values and preferences for future care.  The patient endorses that what is most important right now is to focus on remaining as independent as possible    Accordingly, we have decided that the best plan to meet the patient's goals includes continuing with treatment    I did not explain the role for hospice care at this stage of the patient's illness, including its ability to help the patient live with the best quality of life possible.  We will not be making a hospice referral.    I spent a total of 16 minutes engaging the patient in this advance care planning discussion.               Follow up in about 3 months (around 11/17/2022). In addition to their scheduled follow up, the patient has also been instructed to follow up on as needed basis.

## 2022-08-18 ENCOUNTER — TELEPHONE (OUTPATIENT)
Dept: PRIMARY CARE CLINIC | Facility: CLINIC | Age: 77
End: 2022-08-18
Payer: MEDICARE

## 2022-08-18 DIAGNOSIS — R82.81 PYURIA: Primary | ICD-10-CM

## 2022-08-18 LAB
CREAT UR-MCNC: 141.9 MG/DL (ref 47–110)
MICROALBUMIN UR-MCNC: 15.2 UG/ML
MICROALBUMIN/CREAT RATIO PNL UR: 10.7 MG/GM CR (ref 0–30)

## 2022-08-18 NOTE — TELEPHONE ENCOUNTER
----- Message from Naz Lugo MD sent at 8/18/2022 12:50 PM CDT -----  I'd like to get the A1C under 7.5 but this is better than last time so keep working on diet. The blood count is stable. The urine shows some cells, they should do a culture, her chemistry is good. The microalbumin is listed as pending but there isn't a culture, be sure they run since it was 6-10 white cells. I would want that to reflex.

## 2022-08-18 NOTE — TELEPHONE ENCOUNTER
----- Message from Naz Lugo MD sent at 8/18/2022  2:05 PM CDT -----  Microalbumin is ok, I hope they still have urine for a culture, it doesn't look like it reflexed.

## 2022-08-23 ENCOUNTER — TELEPHONE (OUTPATIENT)
Dept: NEUROLOGY | Facility: CLINIC | Age: 77
End: 2022-08-23
Payer: MEDICARE

## 2022-08-23 NOTE — TELEPHONE ENCOUNTER
----- Message from Alondra Summers sent at 2022  1:33 PM CDT -----  Regarding: call back request  CallType: Patient Call  To: Jelly   From: Ena Asmita   Phone: [phone]   Patient name: Demetrice Barrett   : 1945   Reg Dr: Dr Paty Marcelino   Ref: Would like to speak regarding  the patient.    Clr ID: [id number]    --------------------------------------  Message History  Account: [id number]  Taken:  Tue 23-Aug-2022 11:57a [facility]  Serial#: [id number]

## 2022-08-23 NOTE — TELEPHONE ENCOUNTER
Spoke with pt's daughter (Ena Tian) and informed her that I will reach back out to YAMINI Lamar's office to follow up on pt's referral. Daughter is requesting if YAMINI Eastman's office don't respond if we can try another Psychiatrist. Please advise.

## 2022-10-13 ENCOUNTER — HOSPITAL ENCOUNTER (OUTPATIENT)
Facility: HOSPITAL | Age: 77
Discharge: HOME HEALTH | End: 2022-10-15
Attending: EMERGENCY MEDICINE | Admitting: INTERNAL MEDICINE
Payer: MEDICARE

## 2022-10-13 DIAGNOSIS — R53.1 WEAKNESS: ICD-10-CM

## 2022-10-13 DIAGNOSIS — I63.9 STROKE: ICD-10-CM

## 2022-10-13 LAB
ALBUMIN SERPL-MCNC: 4.1 GM/DL (ref 3.4–4.8)
ALBUMIN/GLOB SERPL: 1.3 RATIO (ref 1.1–2)
ALP SERPL-CCNC: 63 UNIT/L (ref 40–150)
ALT SERPL-CCNC: 12 UNIT/L (ref 0–55)
AMPHET UR QL SCN: NEGATIVE
APPEARANCE UR: CLEAR
AST SERPL-CCNC: 17 UNIT/L (ref 5–34)
BACTERIA #/AREA URNS AUTO: NORMAL /HPF
BARBITURATE SCN PRESENT UR: NEGATIVE
BASOPHILS # BLD AUTO: 0.02 X10(3)/MCL (ref 0–0.2)
BASOPHILS NFR BLD AUTO: 0.3 %
BENZODIAZ UR QL SCN: NEGATIVE
BILIRUB UR QL STRIP.AUTO: NEGATIVE MG/DL
BILIRUBIN DIRECT+TOT PNL SERPL-MCNC: 0.4 MG/DL
BUN SERPL-MCNC: 11.1 MG/DL (ref 9.8–20.1)
CALCIUM SERPL-MCNC: 9.8 MG/DL (ref 8.4–10.2)
CANNABINOIDS UR QL SCN: NEGATIVE
CHLORIDE SERPL-SCNC: 107 MMOL/L (ref 98–107)
CHOLEST SERPL-MCNC: 114 MG/DL
CHOLEST/HDLC SERPL: 2 {RATIO} (ref 0–5)
CO2 SERPL-SCNC: 27 MMOL/L (ref 23–31)
COCAINE UR QL SCN: NEGATIVE
COLOR UR AUTO: YELLOW
CREAT SERPL-MCNC: 0.89 MG/DL (ref 0.55–1.02)
CRP SERPL-MCNC: 0.4 MG/L
EOSINOPHIL # BLD AUTO: 0.04 X10(3)/MCL (ref 0–0.9)
EOSINOPHIL NFR BLD AUTO: 0.5 %
ERYTHROCYTE [DISTWIDTH] IN BLOOD BY AUTOMATED COUNT: 13.7 % (ref 11.5–17)
ERYTHROCYTE [SEDIMENTATION RATE] IN BLOOD: 14 MM/HR (ref 0–20)
EST. AVERAGE GLUCOSE BLD GHB EST-MCNC: 159.9 MG/DL
FENTANYL UR QL SCN: NEGATIVE
GFR SERPLBLD CREATININE-BSD FMLA CKD-EPI: >60 MLS/MIN/1.73/M2
GLOBULIN SER-MCNC: 3.2 GM/DL (ref 2.4–3.5)
GLUCOSE SERPL-MCNC: 133 MG/DL (ref 82–115)
GLUCOSE UR QL STRIP.AUTO: NEGATIVE MG/DL
HBA1C MFR BLD: 7.2 %
HCT VFR BLD AUTO: 39.6 % (ref 37–47)
HDLC SERPL-MCNC: 50 MG/DL (ref 35–60)
HGB BLD-MCNC: 12.8 GM/DL (ref 12–16)
IMM GRANULOCYTES # BLD AUTO: 0.03 X10(3)/MCL (ref 0–0.04)
IMM GRANULOCYTES NFR BLD AUTO: 0.4 %
KETONES UR QL STRIP.AUTO: NEGATIVE MG/DL
LDLC SERPL CALC-MCNC: 52 MG/DL (ref 50–140)
LEUKOCYTE ESTERASE UR QL STRIP.AUTO: ABNORMAL UNIT/L
LYMPHOCYTES # BLD AUTO: 2.59 X10(3)/MCL (ref 0.6–4.6)
LYMPHOCYTES NFR BLD AUTO: 33.2 %
MCH RBC QN AUTO: 30.2 PG (ref 27–31)
MCHC RBC AUTO-ENTMCNC: 32.3 MG/DL (ref 33–36)
MCV RBC AUTO: 93.4 FL (ref 80–94)
MDMA UR QL SCN: NEGATIVE
MONOCYTES # BLD AUTO: 0.44 X10(3)/MCL (ref 0.1–1.3)
MONOCYTES NFR BLD AUTO: 5.6 %
NEUTROPHILS # BLD AUTO: 4.7 X10(3)/MCL (ref 2.1–9.2)
NEUTROPHILS NFR BLD AUTO: 60 %
NITRITE UR QL STRIP.AUTO: NEGATIVE
NRBC BLD AUTO-RTO: 0 %
OPIATES UR QL SCN: NEGATIVE
PCP UR QL: NEGATIVE
PH UR STRIP.AUTO: 6.5 [PH]
PH UR: 6.5 [PH] (ref 3–11)
PLATELET # BLD AUTO: 286 X10(3)/MCL (ref 130–400)
PMV BLD AUTO: 10.3 FL (ref 7.4–10.4)
POCT GLUCOSE: 137 MG/DL (ref 70–110)
POCT GLUCOSE: 244 MG/DL (ref 70–110)
POTASSIUM SERPL-SCNC: 4 MMOL/L (ref 3.5–5.1)
PROT SERPL-MCNC: 7.3 GM/DL (ref 5.8–7.6)
PROT UR QL STRIP.AUTO: NEGATIVE MG/DL
RBC # BLD AUTO: 4.24 X10(6)/MCL (ref 4.2–5.4)
RBC #/AREA URNS AUTO: <5 /HPF
RBC UR QL AUTO: NEGATIVE UNIT/L
SODIUM SERPL-SCNC: 142 MMOL/L (ref 136–145)
SP GR UR STRIP.AUTO: 1.02 (ref 1–1.03)
SPECIFIC GRAVITY, URINE AUTO (.000) (OHS): 1.02 (ref 1–1.03)
SQUAMOUS #/AREA URNS AUTO: <5 /HPF
TRIGL SERPL-MCNC: 58 MG/DL (ref 37–140)
TROPONIN I SERPL-MCNC: <0.01 NG/ML (ref 0–0.04)
TSH SERPL-ACNC: 1.34 UIU/ML (ref 0.35–4.94)
UROBILINOGEN UR STRIP-ACNC: 1 MG/DL
VLDLC SERPL CALC-MCNC: 12 MG/DL
WBC # SPEC AUTO: 7.8 X10(3)/MCL (ref 4.5–11.5)
WBC #/AREA URNS AUTO: <5 /HPF

## 2022-10-13 PROCEDURE — 80061 LIPID PANEL: CPT | Performed by: PHYSICIAN ASSISTANT

## 2022-10-13 PROCEDURE — G0378 HOSPITAL OBSERVATION PER HR: HCPCS

## 2022-10-13 PROCEDURE — 36415 COLL VENOUS BLD VENIPUNCTURE: CPT | Performed by: PHYSICIAN ASSISTANT

## 2022-10-13 PROCEDURE — 93010 EKG 12-LEAD: ICD-10-PCS | Mod: ,,, | Performed by: INTERNAL MEDICINE

## 2022-10-13 PROCEDURE — 83036 HEMOGLOBIN GLYCOSYLATED A1C: CPT | Performed by: PHYSICIAN ASSISTANT

## 2022-10-13 PROCEDURE — 81001 URINALYSIS AUTO W/SCOPE: CPT | Performed by: PHYSICIAN ASSISTANT

## 2022-10-13 PROCEDURE — 80307 DRUG TEST PRSMV CHEM ANLYZR: CPT | Performed by: PHYSICIAN ASSISTANT

## 2022-10-13 PROCEDURE — 25500020 PHARM REV CODE 255: Performed by: INTERNAL MEDICINE

## 2022-10-13 PROCEDURE — 25000003 PHARM REV CODE 250: Performed by: INTERNAL MEDICINE

## 2022-10-13 PROCEDURE — 85651 RBC SED RATE NONAUTOMATED: CPT | Performed by: PHYSICIAN ASSISTANT

## 2022-10-13 PROCEDURE — 84443 ASSAY THYROID STIM HORMONE: CPT | Performed by: PHYSICIAN ASSISTANT

## 2022-10-13 PROCEDURE — 80053 COMPREHEN METABOLIC PANEL: CPT | Performed by: PHYSICIAN ASSISTANT

## 2022-10-13 PROCEDURE — 93005 ELECTROCARDIOGRAM TRACING: CPT

## 2022-10-13 PROCEDURE — 86140 C-REACTIVE PROTEIN: CPT | Performed by: PHYSICIAN ASSISTANT

## 2022-10-13 PROCEDURE — 99285 EMERGENCY DEPT VISIT HI MDM: CPT | Mod: 25

## 2022-10-13 PROCEDURE — 96372 THER/PROPH/DIAG INJ SC/IM: CPT | Performed by: PHYSICIAN ASSISTANT

## 2022-10-13 PROCEDURE — 82962 GLUCOSE BLOOD TEST: CPT

## 2022-10-13 PROCEDURE — 63600175 PHARM REV CODE 636 W HCPCS: Performed by: PHYSICIAN ASSISTANT

## 2022-10-13 PROCEDURE — 84484 ASSAY OF TROPONIN QUANT: CPT | Performed by: PHYSICIAN ASSISTANT

## 2022-10-13 PROCEDURE — 93010 ELECTROCARDIOGRAM REPORT: CPT | Mod: ,,, | Performed by: INTERNAL MEDICINE

## 2022-10-13 PROCEDURE — 85025 COMPLETE CBC W/AUTO DIFF WBC: CPT | Performed by: PHYSICIAN ASSISTANT

## 2022-10-13 RX ORDER — SODIUM CHLORIDE 0.9 % (FLUSH) 0.9 %
10 SYRINGE (ML) INJECTION
Status: DISCONTINUED | OUTPATIENT
Start: 2022-10-13 | End: 2022-10-15 | Stop reason: HOSPADM

## 2022-10-13 RX ORDER — NAPROXEN SODIUM 220 MG/1
81 TABLET, FILM COATED ORAL DAILY
Status: DISCONTINUED | OUTPATIENT
Start: 2022-10-14 | End: 2022-10-15 | Stop reason: HOSPADM

## 2022-10-13 RX ORDER — INSULIN ASPART 100 [IU]/ML
1-10 INJECTION, SOLUTION INTRAVENOUS; SUBCUTANEOUS EVERY 6 HOURS PRN
Status: DISCONTINUED | OUTPATIENT
Start: 2022-10-13 | End: 2022-10-15 | Stop reason: HOSPADM

## 2022-10-13 RX ORDER — DEXTROSE MONOHYDRATE 100 MG/ML
12.5 INJECTION, SOLUTION INTRAVENOUS
Status: DISCONTINUED | OUTPATIENT
Start: 2022-10-13 | End: 2022-10-15 | Stop reason: HOSPADM

## 2022-10-13 RX ORDER — GLUCAGON 1 MG
1 KIT INJECTION
Status: DISCONTINUED | OUTPATIENT
Start: 2022-10-13 | End: 2022-10-15 | Stop reason: HOSPADM

## 2022-10-13 RX ORDER — LABETALOL HYDROCHLORIDE 5 MG/ML
10 INJECTION, SOLUTION INTRAVENOUS
Status: DISCONTINUED | OUTPATIENT
Start: 2022-10-13 | End: 2022-10-15 | Stop reason: HOSPADM

## 2022-10-13 RX ORDER — DONEPEZIL HYDROCHLORIDE 5 MG/1
5 TABLET, FILM COATED ORAL 2 TIMES DAILY
Status: DISCONTINUED | OUTPATIENT
Start: 2022-10-13 | End: 2022-10-15 | Stop reason: HOSPADM

## 2022-10-13 RX ORDER — ATORVASTATIN CALCIUM 40 MG/1
40 TABLET, FILM COATED ORAL NIGHTLY
Status: DISCONTINUED | OUTPATIENT
Start: 2022-10-13 | End: 2022-10-15 | Stop reason: HOSPADM

## 2022-10-13 RX ADMIN — DONEPEZIL HYDROCHLORIDE 5 MG: 5 TABLET, FILM COATED ORAL at 08:10

## 2022-10-13 RX ADMIN — INSULIN ASPART 4 UNITS: 100 INJECTION, SOLUTION INTRAVENOUS; SUBCUTANEOUS at 06:10

## 2022-10-13 RX ADMIN — ATORVASTATIN CALCIUM 40 MG: 40 TABLET, FILM COATED ORAL at 08:10

## 2022-10-13 RX ADMIN — IOPAMIDOL 100 ML: 755 INJECTION, SOLUTION INTRAVENOUS at 06:10

## 2022-10-13 NOTE — H&P
[facility]  Hospital Medicine History & Physical Examination       Patient Name: Demetrice Barrett  MRN: [id number]  Patient Class: OP- Observation   Admission Date: 10/13/2022   Admitting Physician: Cheng Cage MD   Length of Stay: 0  Attending Physician: Cheng Cage MD   Primary Care Provider: Naz Lugo MD  Face-to-Face encounter date: 10/13/2022  Code Status: Full Code  Chief Complaint: Facial Pain (Steady gait to triage c/o R side facial pain radiating to R arm. Denies injury/trauma/fall. )        Patient information was obtained from patient, patient's family, past medical records and ER records.     HISTORY OF PRESENT ILLNESS:   Demetrice Barrett is a 77 y.o. Black or  female with a past medical history of hypertension, hyperlipidemia, diabetes mellitus 2 and dementia. The patient presented to [facility] on 10/13/2022 with a primary complaint of swelling to the right-sided of the face and numbness and weakness to the right arm and leg for the last 2 weeks. Patient reports weakness has resulted in her being off balance when walking. She denies complaints of headache, vision changes, facial numbness, shortness of breath, chest pain, nausea, vomiting.  She has had several episodes of diarrhea.  Daughter at bedside denies facial droop or slurred speech.  Patient lives alone, ambulates without assistance and completes activities of daily living independently.    Upon presentation to the ED, patient afebrile, hypertensive 154/68 and SpO2 98% room air.  CBC and CMP within limits.  Troponin less than 0.01.  EKG sinus bradycardia with a ventricular rate of 52.  CT head without acute abnormalities.  Neurology consult.  Patient is admitted to Hospital Medicine Services for further medical management.    PAST MEDICAL HISTORY:   Hypertension  hyperlipidemia   Diabetes mellitus type 2   Dementia    PAST SURGICAL HISTORY:   Tonsillectomy   Colonoscopy    Hysterectomy    ALLERGIES:   Patient has no known allergies.    FAMILY HISTORY:   Mother: Hypertension    SOCIAL HISTORY:   Denies tobacco, alcohol and illicit drug use    HOME MEDICATIONS:     Prior to Admission medications    Medication Sig Start Date End Date Taking? Authorizing Provider   alendronate (FOSAMAX) 70 MG tablet TAKE 1 TABLET EVERY WEEK AS DIRECTED;  SEE PACKAGE FOR ADDITIONAL INSTRUCTIONS 5/6/22   Naz Lugo MD   amLODIPine (NORVASC) 10 MG tablet Take 10 mg by mouth once daily at 6am. 4/28/22   Historical Provider   donepeziL (ARICEPT) 5 MG tablet 5 mg PO q PM x 1 month, then increase to 5 mg BID  Patient not taking: Reported on 8/17/2022 8/16/22   ANGEL Baker   enalapril (VASOTEC) 20 MG tablet TAKE 1 TABLET EVERY DAY 6/6/22   Naz Lugo MD   glipiZIDE (GLUCOTROL) 5 MG tablet TAKE 1 TABLET TWICE DAILY 6/6/22   Naz Lugo MD   metFORMIN (GLUCOPHAGE) 850 MG tablet TAKE 1 TABLET TWICE DAILY 9/12/22   Naz Lugo MD   pioglitazone (ACTOS) 30 MG tablet TAKE 1 TABLET EVERY DAY 6/6/22   Naz Lugo MD   rosuvastatin (CRESTOR) 20 MG tablet Take 20 mg by mouth once daily at 6am. 3/23/22   Historical Provider   TRUEPLUS LANCETS 33 gauge Misc TEST BLOOD SUGAR TWICE DAILY 7/4/22   Naz Lugo MD       REVIEW OF SYSTEMS:   Except as documented, all other systems reviewed and negative     PHYSICAL EXAM:     VITAL SIGNS: 24 HRS MIN & MAX LAST   Temp  Min: 98.2 °F (36.8 °C)  Max: 98.2 °F (36.8 °C) 98.2 °F (36.8 °C)   BP  Min: 154/68  Max: 167/78 (!) 167/78     Pulse  Min: 53  Max: 82  (!) 53     Resp  Min: 15  Max: 20 15   SpO2  Min: 98 %  Max: 99 % 99 %       General appearance: Well-developed, well-nourished female in no apparent distress.  Daughter at bedside.  HEENT: Atraumatic head. Moist mucous membranes of oral cavity.  Lungs: Clear to auscultation bilaterally.   Heart: Regular rate and rhythm.  No edema.  Abdomen: Soft, non-distended.   Extremities: No cyanosis, clubbing.  No deformities.  Skin: No Rash. Warm and dry.  Neuro: Awake, alert and oriented.  Mild right facial droop.  Normal speech.  No pronator drift.  3/5 strength to right extremities.  5/5 strength to left extremities.  Psych/mental status: Appropriate mood and affect. Cooperative. Responds appropriately to questions.       LABS AND IMAGING:     Recent Labs   Lab 10/13/22  1417   WBC 7.8   RBC 4.24   HGB 12.8   HCT 39.6   MCV 93.4   MCH 30.2   MCHC 32.3*   RDW 13.7      MPV 10.3       Recent Labs   Lab 10/13/22  1417      K 4.0   CO2 27   BUN 11.1   CREATININE 0.89   CALCIUM 9.8   ALBUMIN 4.1   ALKPHOS 63   ALT 12   AST 17   BILITOT 0.4       Microbiology Results (last 7 days)       ** No results found for the last 168 hours. **             CT Head Without Contrast  Narrative: EXAMINATION:  CT HEAD WITHOUT CONTRAST    CLINICAL HISTORY:  Neuro deficit, acute, stroke suspected;    TECHNIQUE:  Multiple axial images were obtained from the base of the brain to the vertex without contrast administration.  Sagittal and coronal reconstructions were performed. .Automatic exposure control  (AEC) is utilized to reduce patient radiation exposure.    COMPARISON:  MRI dated 10/19/2021    FINDINGS:  There is no intracranial mass or lesion seen.  No hemorrhage is seen.  No infarct is seen.  The ventricles and basilar cisterns appear normal.  Brain parenchyma appears grossly unremarkable.    Posterior fossa appears normal.  The calvarium is intact.  The paranasal sinuses appear grossly unremarkable.  Impression: No acute abnormality seen    Electronically signed by: Jad Skelton  Date:    10/13/2022  Time:    14:31        ASSESSMENT & PLAN:   Assessment:  Right hemiplegia, CVA  HTN  Hyperlipidemia  Diabetes mellitus type 2   Dementia, likely vascular type    Plan:  - Neurology consulted. Appreciate recommendations  - CVA work up:  > MRI Brain and CTA Head and Neck - ordered  > Echo and Carotid US - ordered  >  Hemoglobin A1C, Lipid Panel, ESR and CRP - ordered  > UDS - ordered  > Physical, Occupation and Speech Therapy consulted  > Permissive hypertension, IV Labetalol and Hydralazine as needed for SBP >220 or DBP> 120  - Accu-checks and sliding scale  - Labs in AM      VTE Prophylaxis: will be placed on SCD for DVT prophylaxis and will be advised to be as mobile as possible and sit in a chair as tolerated      __________________________________________________________________________  INPATIENT LIST OF MEDICATIONS     Current Facility-Administered Medications:     labetaloL injection 10 mg, 10 mg, Intravenous, Q15 Min PRN, Yanique Marti PA-C    sodium chloride 0.9% flush 10 mL, 10 mL, Intravenous, PRN, Yanique Marti PA-C    Current Outpatient Medications:     alendronate (FOSAMAX) 70 MG tablet, TAKE 1 TABLET EVERY WEEK AS DIRECTED;  SEE PACKAGE FOR ADDITIONAL INSTRUCTIONS, Disp: 12 tablet, Rfl: 1    amLODIPine (NORVASC) 10 MG tablet, Take 10 mg by mouth once daily at 6am., Disp: , Rfl:     donepeziL (ARICEPT) 5 MG tablet, 5 mg PO q PM x 1 month, then increase to 5 mg BID (Patient not taking: Reported on 8/17/2022), Disp: 30 tablet, Rfl: 5    enalapril (VASOTEC) 20 MG tablet, TAKE 1 TABLET EVERY DAY, Disp: 90 tablet, Rfl: 1    glipiZIDE (GLUCOTROL) 5 MG tablet, TAKE 1 TABLET TWICE DAILY, Disp: 180 tablet, Rfl: 1    metFORMIN (GLUCOPHAGE) 850 MG tablet, TAKE 1 TABLET TWICE DAILY, Disp: 180 tablet, Rfl: 1    pioglitazone (ACTOS) 30 MG tablet, TAKE 1 TABLET EVERY DAY, Disp: 90 tablet, Rfl: 1    rosuvastatin (CRESTOR) 20 MG tablet, Take 20 mg by mouth once daily at 6am., Disp: , Rfl:     TRUEPLUS LANCETS 33 gauge Misc, TEST BLOOD SUGAR TWICE DAILY, Disp: 200 each, Rfl: 3      Scheduled Meds:  Continuous Infusions:  PRN Meds:.labetalol, sodium chloride 0.9%      Discharge Planning and Disposition: Anticipated discharge to be determined.    IYanique PA, have reviewed and discussed the case with Dr. Cheng MARK  MD [place]    Please see the following addendum for further assessment and plan from there attending MD.    Yanique Marti PA-C  10/13/2022    ________________________________________________________________________________    MD Addendum:    I Dr. Gerald Cage performed substantive portion of the visit, personally performed a face to face evaluation of the patient and have reviewed and agree with NP/PA documentation, treatment and plan & MDM.         A 78 yo obese woman with Dementia, T2DM,  HTN, HLD, Tubular adenoma of colon, OP presented with 2 week duration right sided weakness, right facial droop,  lower extremity distal numbness not a/w seizure, bladder or bowel incontinenece, visual deficits or syncope. She was hypertensive at presentation. CT head was wnl. Labs showed normal trop and no ther abnormalities. At baseline she is independant with ADLS and IADLS and denies any medication nonadherence. She is being admitted to  for further work up and management. When seen in ER, she was comfortably eating and tolerating well. Family member was at bedside. She has profound right deficits 2-3/5. Agree with exam an dplan mentioned above. Add Aspirin, statin. Will get Neurology and complete rest of the stroke work up. Allow permossive hypertension unti ischemic stroke is ruled out. Involve therapy services.     Critical care diagnosis:Acute cva  Critical care time: 50 minutes      Cheng Cage MD  10/13/2022

## 2022-10-13 NOTE — ED PROVIDER NOTES
Encounter Date: 10/13/2022       History     Chief Complaint   Patient presents with    Facial Pain     Steady gait to triage c/o R side facial pain radiating to R arm. Denies injury/trauma/fall.      This is a 77-year-old black female who presents to the ED with complaints of right-sided facial drooping and weakness and pain in her right upper and right lower extremity for approximately 2 weeks.  Patient states that she felt like this would get better and thus has not sought help for now.  Patient denies any chest pain or shortness of breath.  She states that that side of her body does feel much weaker than the left side.  She denies any confusion.    The history is provided by the patient. No  was used.   Facial Pain  Pertinent negatives include no shortness of breath.   Neurologic Problem  The primary symptoms include focal weakness. Primary symptoms do not include fever or nausea. The symptoms began several days ago. The episode lasted 2 days. The symptoms are waxing and waning.   Weakness began greater than 24 hours ago. The weakness is unchanged.   Additional symptoms include weakness.   Review of patient's allergies indicates:  No Known Allergies  Past Medical History:   Diagnosis Date    COVID-19 07/08/2020    Diabetes mellitus type II, uncontrolled     HTN (hypertension)     Hypercholesterolemia     Internal hemorrhoids     Obesity     Tubular adenoma of colon      Past Surgical History:   Procedure Laterality Date    COLONOSCOPY N/A 12/04/2020    COLONOSCOPY W/ BIOPSIES AND POLYPECTOMY      TOTAL ABDOMINAL HYSTERECTOMY       Family History   Problem Relation Age of Onset    Hyperlipidemia Mother     Hypertension Mother     Kidney disease Mother     Gout Mother     Stroke Sister     Hypertension Sister     Diabetes Sister     Heart disease Sister     Anemia Sister     Kidney disease Sister      Social History     Tobacco Use    Smoking status: Never    Smokeless tobacco: Never    Substance Use Topics    Alcohol use: Never    Drug use: Never     Review of Systems   Constitutional:  Negative for appetite change, chills, fatigue and fever.   HENT:  Negative for congestion and sinus pressure.    Respiratory:  Negative for chest tightness and shortness of breath.    Gastrointestinal:  Negative for constipation, diarrhea and nausea.   Genitourinary:  Negative for flank pain and hematuria.   Neurological:  Positive for focal weakness, facial asymmetry and weakness.   All other systems reviewed and are negative.    Physical Exam     Initial Vitals [10/13/22 1058]   BP Pulse Resp Temp SpO2   (!) 154/68 82 20 98.2 °F (36.8 °C) 98 %      MAP       --         Physical Exam    Nursing note and vitals reviewed.  Constitutional: She appears well-developed and well-nourished.   HENT:   Head: Normocephalic and atraumatic.   Eyes: EOM are normal. Pupils are equal, round, and reactive to light.   Neck:   Normal range of motion.  Cardiovascular:  Normal rate and regular rhythm.           Pulmonary/Chest: Breath sounds normal.   Abdominal: Abdomen is soft. Bowel sounds are normal.   Musculoskeletal:      Cervical back: Normal range of motion.     Neurological: She is alert and oriented to person, place, and time.   Patient has significant weakness to 2/5 of right upper extremity and   3/5 lower weakness of right lower extremity compared to left.  Patient is aaox3    Skin: Skin is warm and dry.   Psychiatric: She has a normal mood and affect.       ED Course   Procedures  Labs Reviewed   COMPREHENSIVE METABOLIC PANEL - Abnormal; Notable for the following components:       Result Value    Glucose Level 133 (*)     All other components within normal limits   CBC WITH DIFFERENTIAL - Abnormal; Notable for the following components:    MCHC 32.3 (*)     All other components within normal limits   TROPONIN I - Normal   CBC W/ AUTO DIFFERENTIAL    Narrative:     The following orders were created for panel order CBC  auto differential.  Procedure                               Abnormality         Status                     ---------                               -----------         ------                     CBC with Differential[[id number]]        Abnormal            Final result                 Please view results for these tests on the individual orders.     EKG Readings: (Independently Interpreted)   Initial Reading: No STEMI. Rhythm: Sinus Bradycardia. Heart Rate: 52. Ectopy: No Ectopy. Conduction: Normal. Clinical Impression: Sinus Bradycardia     Imaging Results              CT Head Without Contrast (Final result)  Result time 10/13/22 14:31:03      Final result by Jad Skelton MD (10/13/22 14:31:03)                   Impression:      No acute abnormality seen      Electronically signed by: Jad Skelton  Date:    10/13/2022  Time:    14:31               Narrative:    EXAMINATION:  CT HEAD WITHOUT CONTRAST    CLINICAL HISTORY:  Neuro deficit, acute, stroke suspected;    TECHNIQUE:  Multiple axial images were obtained from the base of the brain to the vertex without contrast administration.  Sagittal and coronal reconstructions were performed. .Automatic exposure control  (AEC) is utilized to reduce patient radiation exposure.    COMPARISON:  MRI dated 10/19/2021    FINDINGS:  There is no intracranial mass or lesion seen.  No hemorrhage is seen.  No infarct is seen.  The ventricles and basilar cisterns appear normal.  Brain parenchyma appears grossly unremarkable.    Posterior fossa appears normal.  The calvarium is intact.  The paranasal sinuses appear grossly unremarkable.                                       Medications - No data to display              ED Course as of 10/13/22 1551   Thu Oct 13, 2022   1508 CT Head Without Contrast [KL]      ED Course User Index  [KL] Rylie Landa PA-C                 Clinical Impression:   Final diagnoses:  [R53.1] Weakness        ED Disposition Condition     Observation Stable                Rylie Landa PA-C  10/13/22 [id number]

## 2022-10-13 NOTE — PROVIDER PROGRESS NOTES - EMERGENCY DEPT.
Encounter Date: 10/13/2022    ED Physician Progress Notes        Physician Note:   Spoke with hospitalist NP at 3:27 p.m..  Recommends Neurology consult and will admit the patient.

## 2022-10-13 NOTE — Clinical Note
Diagnosis: Weakness [393787]   Future Attending Provider: NIMO ASCENCIO [[id number]]   Admitting Provider:: NIMO ASCENCIO [[id number]]

## 2022-10-14 PROBLEM — R53.1 RIGHT SIDED WEAKNESS: Status: ACTIVE | Noted: 2022-10-14

## 2022-10-14 LAB
ALBUMIN SERPL-MCNC: 3.6 GM/DL (ref 3.4–4.8)
ALBUMIN/GLOB SERPL: 1.4 RATIO (ref 1.1–2)
ALP SERPL-CCNC: 57 UNIT/L (ref 40–150)
ALT SERPL-CCNC: 13 UNIT/L (ref 0–55)
AORTIC ROOT ANNULUS: 3.5 CM
AORTIC VALVE CUSP SEPERATION: 2 CM
APTT PPP: 31.2 SECONDS (ref 23.2–33.7)
AST SERPL-CCNC: 15 UNIT/L (ref 5–34)
AV INDEX (PROSTH): 0.6
AV MEAN GRADIENT: 3 MMHG
AV PEAK GRADIENT: 5 MMHG
AV REGURGITATION PRESSURE HALF TIME: 1077 MS
AV VALVE AREA: 1.88 CM2
AV VELOCITY RATIO: 0.53
BASOPHILS # BLD AUTO: 0.03 X10(3)/MCL (ref 0–0.2)
BASOPHILS NFR BLD AUTO: 0.4 %
BILIRUBIN DIRECT+TOT PNL SERPL-MCNC: 0.3 MG/DL
BSA FOR ECHO PROCEDURE: 1.77 M2
BUN SERPL-MCNC: 12.7 MG/DL (ref 9.8–20.1)
CALCIUM SERPL-MCNC: 9.3 MG/DL (ref 8.4–10.2)
CHLORIDE SERPL-SCNC: 104 MMOL/L (ref 98–107)
CK MB SERPL-MCNC: 0.6 NG/ML
CO2 SERPL-SCNC: 26 MMOL/L (ref 23–31)
CREAT SERPL-MCNC: 0.94 MG/DL (ref 0.55–1.02)
CV ECHO LV RWT: 0.59 CM
DOP CALC AO PEAK VEL: 1.15 M/S
DOP CALC AO VTI: 26.5 CM
DOP CALC LVOT AREA: 3.1 CM2
DOP CALC LVOT DIAMETER: 2 CM
DOP CALC LVOT PEAK VEL: 0.61 M/S
DOP CALC LVOT STROKE VOLUME: 49.93 CM3
DOP CALC MV VTI: 31.5 CM
DOP CALCLVOT PEAK VEL VTI: 15.9 CM
E WAVE DECELERATION TIME: 195 MSEC
E/A RATIO: 0.8
E/E' RATIO: 13.82 M/S
ECHO LV POSTERIOR WALL: 1.45 CM (ref 0.6–1.1)
EJECTION FRACTION: 55 %
EOSINOPHIL # BLD AUTO: 0.06 X10(3)/MCL (ref 0–0.9)
EOSINOPHIL NFR BLD AUTO: 0.8 %
ERYTHROCYTE [DISTWIDTH] IN BLOOD BY AUTOMATED COUNT: 13.7 % (ref 11.5–17)
FRACTIONAL SHORTENING: 34 % (ref 28–44)
GFR SERPLBLD CREATININE-BSD FMLA CKD-EPI: >60 MLS/MIN/1.73/M2
GLOBULIN SER-MCNC: 2.5 GM/DL (ref 2.4–3.5)
GLUCOSE SERPL-MCNC: 200 MG/DL (ref 82–115)
HCT VFR BLD AUTO: 37 % (ref 37–47)
HGB BLD-MCNC: 11.5 GM/DL (ref 12–16)
IMM GRANULOCYTES # BLD AUTO: 0.02 X10(3)/MCL (ref 0–0.04)
IMM GRANULOCYTES NFR BLD AUTO: 0.3 %
INR BLD: 1.05 (ref 0–1.3)
INTERVENTRICULAR SEPTUM: 1.3 CM (ref 0.6–1.1)
LEFT ATRIUM SIZE: 3 CM
LEFT ATRIUM VOLUME INDEX MOD: 13.4 ML/M2
LEFT ATRIUM VOLUME MOD: 23.1 CM3
LEFT CCA DIST DIAS: 7 CM/S
LEFT CCA DIST SYS: 74 CM/S
LEFT CCA PROX DIAS: 8 CM/S
LEFT CCA PROX SYS: 65 CM/S
LEFT ECA DIAS: 7 CM/S
LEFT ECA SYS: 55 CM/S
LEFT ICA DIST DIAS: 12 CM/S
LEFT ICA DIST SYS: 59 CM/S
LEFT ICA MID DIAS: 7 CM/S
LEFT ICA MID SYS: 36 CM/S
LEFT ICA PROX DIAS: 5 CM/S
LEFT ICA PROX SYS: 32 CM/S
LEFT INTERNAL DIMENSION IN SYSTOLE: 3.23 CM (ref 2.1–4)
LEFT VENTRICLE DIASTOLIC VOLUME INDEX: 66.28 ML/M2
LEFT VENTRICLE DIASTOLIC VOLUME: 114 ML
LEFT VENTRICLE MASS INDEX: 162 G/M2
LEFT VENTRICLE SYSTOLIC VOLUME INDEX: 24.4 ML/M2
LEFT VENTRICLE SYSTOLIC VOLUME: 41.9 ML
LEFT VENTRICULAR INTERNAL DIMENSION IN DIASTOLE: 4.93 CM (ref 3.5–6)
LEFT VENTRICULAR MASS: 277.79 G
LEFT VERTEBRAL DIAS: 7 CM/S
LEFT VERTEBRAL SYS: 71 CM/S
LV LATERAL E/E' RATIO: 12.67 M/S
LV SEPTAL E/E' RATIO: 15.2 M/S
LVOT MG: 1 MMHG
LVOT MV: 0.4 CM/S
LYMPHOCYTES # BLD AUTO: 2.82 X10(3)/MCL (ref 0.6–4.6)
LYMPHOCYTES NFR BLD AUTO: 37.5 %
MAGNESIUM SERPL-MCNC: 1.8 MG/DL (ref 1.6–2.6)
MCH RBC QN AUTO: 29.3 PG (ref 27–31)
MCHC RBC AUTO-ENTMCNC: 31.1 MG/DL (ref 33–36)
MCV RBC AUTO: 94.4 FL (ref 80–94)
MONOCYTES # BLD AUTO: 0.51 X10(3)/MCL (ref 0.1–1.3)
MONOCYTES NFR BLD AUTO: 6.8 %
MV MEAN GRADIENT: 1 MMHG
MV PEAK A VEL: 0.95 M/S
MV PEAK E VEL: 0.76 M/S
MV PEAK GRADIENT: 4 MMHG
MV STENOSIS PRESSURE HALF TIME: 63 MS
MV VALVE AREA BY CONTINUITY EQUATION: 1.58 CM2
MV VALVE AREA P 1/2 METHOD: 3.49 CM2
NEUTROPHILS # BLD AUTO: 4.1 X10(3)/MCL (ref 2.1–9.2)
NEUTROPHILS NFR BLD AUTO: 54.2 %
NRBC BLD AUTO-RTO: 0 %
OHS CV CAROTID RIGHT ICA EDV HIGHEST: 13
OHS CV CAROTID ULTRASOUND LEFT ICA/CCA RATIO: 0.8
OHS CV CAROTID ULTRASOUND RIGHT ICA/CCA RATIO: 1.08
OHS CV PV CAROTID LEFT HIGHEST CCA: 74
OHS CV PV CAROTID LEFT HIGHEST ICA: 59
OHS CV PV CAROTID RIGHT HIGHEST CCA: 63
OHS CV PV CAROTID RIGHT HIGHEST ICA: 64
OHS CV US CAROTID LEFT HIGHEST EDV: 12
PHOSPHATE SERPL-MCNC: 4.2 MG/DL (ref 2.3–4.7)
PISA AR MAX VEL: 2.43 M/S
PISA MRMAX VEL: 1.56 M/S
PISA TR MAX VEL: 1.9 M/S
PLATELET # BLD AUTO: 241 X10(3)/MCL (ref 130–400)
PMV BLD AUTO: 10.3 FL (ref 7.4–10.4)
POCT GLUCOSE: 222 MG/DL (ref 70–110)
POCT GLUCOSE: 241 MG/DL (ref 70–110)
POTASSIUM SERPL-SCNC: 4.2 MMOL/L (ref 3.5–5.1)
PROT SERPL-MCNC: 6.1 GM/DL (ref 5.8–7.6)
PROTHROMBIN TIME: 13.6 SECONDS (ref 12.5–14.5)
PV PEAK VELOCITY: 1.23 CM/S
RA MAJOR: 3.75 CM
RA WIDTH: 3.75 CM
RBC # BLD AUTO: 3.92 X10(6)/MCL (ref 4.2–5.4)
RIGHT CCA DIST DIAS: 11 CM/S
RIGHT CCA DIST SYS: 59 CM/S
RIGHT CCA PROX DIAS: 12 CM/S
RIGHT CCA PROX SYS: 63 CM/S
RIGHT ECA DIAS: 6 CM/S
RIGHT ECA SYS: 62 CM/S
RIGHT ICA DIST DIAS: 13 CM/S
RIGHT ICA DIST SYS: 64 CM/S
RIGHT ICA MID DIAS: 5 CM/S
RIGHT ICA MID SYS: 61 CM/S
RIGHT ICA PROX DIAS: 6 CM/S
RIGHT ICA PROX SYS: 45 CM/S
RIGHT VENTRICULAR END-DIASTOLIC DIMENSION: 2.14 CM
RIGHT VERTEBRAL DIAS: 7 CM/S
RIGHT VERTEBRAL SYS: 45 CM/S
RV MID DIAMA: 21.1 CM
SODIUM SERPL-SCNC: 138 MMOL/L (ref 136–145)
TDI LATERAL: 0.06 M/S
TDI SEPTAL: 0.05 M/S
TDI: 0.06 M/S
TR MAX PG: 14 MMHG
TRICUSPID ANNULAR PLANE SYSTOLIC EXCURSION: 2.41 CM
TROPONIN I SERPL-MCNC: <0.01 NG/ML (ref 0–0.04)
WBC # SPEC AUTO: 7.5 X10(3)/MCL (ref 4.5–11.5)

## 2022-10-14 PROCEDURE — 80053 COMPREHEN METABOLIC PANEL: CPT | Performed by: PHYSICIAN ASSISTANT

## 2022-10-14 PROCEDURE — 97161 PT EVAL LOW COMPLEX 20 MIN: CPT

## 2022-10-14 PROCEDURE — 95819 EEG AWAKE AND ASLEEP: CPT

## 2022-10-14 PROCEDURE — G0378 HOSPITAL OBSERVATION PER HR: HCPCS

## 2022-10-14 PROCEDURE — 96372 THER/PROPH/DIAG INJ SC/IM: CPT | Performed by: PHYSICIAN ASSISTANT

## 2022-10-14 PROCEDURE — 84100 ASSAY OF PHOSPHORUS: CPT | Performed by: PHYSICIAN ASSISTANT

## 2022-10-14 PROCEDURE — 85730 THROMBOPLASTIN TIME PARTIAL: CPT | Performed by: PHYSICIAN ASSISTANT

## 2022-10-14 PROCEDURE — 25000003 PHARM REV CODE 250: Performed by: INTERNAL MEDICINE

## 2022-10-14 PROCEDURE — 83735 ASSAY OF MAGNESIUM: CPT | Performed by: PHYSICIAN ASSISTANT

## 2022-10-14 PROCEDURE — 63600175 PHARM REV CODE 636 W HCPCS: Performed by: PHYSICIAN ASSISTANT

## 2022-10-14 PROCEDURE — 36415 COLL VENOUS BLD VENIPUNCTURE: CPT | Performed by: PHYSICIAN ASSISTANT

## 2022-10-14 PROCEDURE — 82553 CREATINE MB FRACTION: CPT | Performed by: PHYSICIAN ASSISTANT

## 2022-10-14 PROCEDURE — 97165 OT EVAL LOW COMPLEX 30 MIN: CPT

## 2022-10-14 PROCEDURE — 92523 SPEECH SOUND LANG COMPREHEN: CPT

## 2022-10-14 PROCEDURE — 84484 ASSAY OF TROPONIN QUANT: CPT | Performed by: PHYSICIAN ASSISTANT

## 2022-10-14 PROCEDURE — 85610 PROTHROMBIN TIME: CPT | Performed by: PHYSICIAN ASSISTANT

## 2022-10-14 PROCEDURE — 85025 COMPLETE CBC W/AUTO DIFF WBC: CPT | Performed by: PHYSICIAN ASSISTANT

## 2022-10-14 RX ORDER — LISINOPRIL 5 MG/1
5 TABLET ORAL DAILY
Status: DISCONTINUED | OUTPATIENT
Start: 2022-10-14 | End: 2022-10-15 | Stop reason: HOSPADM

## 2022-10-14 RX ORDER — GLIPIZIDE 5 MG/1
5 TABLET ORAL 2 TIMES DAILY
Status: DISCONTINUED | OUTPATIENT
Start: 2022-10-14 | End: 2022-10-15 | Stop reason: HOSPADM

## 2022-10-14 RX ORDER — AMLODIPINE BESYLATE 5 MG/1
10 TABLET ORAL DAILY
Status: DISCONTINUED | OUTPATIENT
Start: 2022-10-14 | End: 2022-10-15 | Stop reason: HOSPADM

## 2022-10-14 RX ADMIN — GLIPIZIDE 5 MG: 5 TABLET ORAL at 09:10

## 2022-10-14 RX ADMIN — DONEPEZIL HYDROCHLORIDE 5 MG: 5 TABLET, FILM COATED ORAL at 08:10

## 2022-10-14 RX ADMIN — LISINOPRIL 5 MG: 5 TABLET ORAL at 08:10

## 2022-10-14 RX ADMIN — ATORVASTATIN CALCIUM 40 MG: 40 TABLET, FILM COATED ORAL at 09:10

## 2022-10-14 RX ADMIN — AMLODIPINE BESYLATE 10 MG: 5 TABLET ORAL at 08:10

## 2022-10-14 RX ADMIN — INSULIN ASPART 4 UNITS: 100 INJECTION, SOLUTION INTRAVENOUS; SUBCUTANEOUS at 06:10

## 2022-10-14 RX ADMIN — DONEPEZIL HYDROCHLORIDE 5 MG: 5 TABLET, FILM COATED ORAL at 09:10

## 2022-10-14 RX ADMIN — ASPIRIN 81 MG CHEWABLE TABLET 81 MG: 81 TABLET CHEWABLE at 09:10

## 2022-10-14 NOTE — PT/OT/SLP EVAL
"Speech Language Pathology Department  Cognitive-Communication Evaluation    Patient Name:  Demetrice Barrett   MRN:  [id number]  Admitting Diagnosis: <principal problem not specified>    Recommendations:     General recommendations:  SLP intervention not indicated  Communication strategies:  none  Barriers to safe discharge:  none    History:     Past Medical History:   Diagnosis Date    COVID-19 07/08/2020    Diabetes mellitus type II, uncontrolled     HTN (hypertension)     Hypercholesterolemia     Internal hemorrhoids     Obesity     Tubular adenoma of colon        Past Surgical History:   Procedure Laterality Date    COLONOSCOPY N/A 12/04/2020    COLONOSCOPY W/ BIOPSIES AND POLYPECTOMY      TOTAL ABDOMINAL HYSTERECTOMY         Previous level of Function  Lives: alone  Glasses: yes  Hearing Aids: no  Home Responsibilities: Independent; daughter assists with driving for appointments/errands      Subjective     Patient awake, alert, and cooperative.    Patient goals: "to go home"     Pain/Comfort:  Pain Rating 1: 0/10    Respiratory Status: WFL    Objective:     SPEECH PRODUCTION  Phoneme Production: WFL  Voice Quality: WFL  Voice Production: WFL  Speech Rate: WFL  Loudness: WFL  Respiration: WFL  Resonance: WFL  Prosody: WFL  Speech Intelligibility  Known Context: Greater that 90%  Unknown Context: Greater that 90%    AUDITORY COMPREHENSION  Identification:  Objects: WFL  Following Directions:  1-Step: WFL  2-Step: WFL  Yes/No Questions:  Simple: WFL  Complex: WFL    VERBAL EXPRESSION  Automatic Speech:  Days of the week: WFL  Counting: WFL  Confrontation Naming  Objects: WFL  Wh- Questions:  Object function: WFL    COGNITION  Orientation:  Person: Yes  Place: Yes  Time: Yes  Situation: Yes  Attention:  Sustained: WFL  Selective: WFL  Memory:  Short Term: Mild impairment - baseline per patient and daughter  Long Term: WFL  Problem Solving  Functional simple: WFL      Assessment:     Passed RN swallow screen. " Cognitive-linguistic skills functional and at baseline at this time. MRI negative for acute abnormalities. No further skilled ST services warranted at this time.    Goals:   Multidisciplinary Problems       SLP Goals       Not on file                    Plan:     Plan of Care reviewed with:  patient, daughter   SLP Follow-Up:  No       Time Tracking:     SLP Treatment Date:   11/14/22  Speech Start Time:  1100  Speech Stop Time:  1120     Speech Total Time (min):  20 min    Billable minutes:  Evaluation of Speech Sound Production with Comprehension and Expression, 20 minutes        10/14/2022

## 2022-10-14 NOTE — CONSULTS
[facility] - 9th Floor Med Surg  Neurology  Consult Note    Patient Name: Demetrice Barrett  MRN: [id number]  Admission Date: 10/13/2022  Hospital Length of Stay: 0 days  Code Status: Full Code   Attending Provider: Cheng Cage MD   Consulting Provider: ANGEL Tripp  Primary Care Physician: Naz Lugo MD  Principal Problem:<principal problem not specified>    Inpatient consult to Neurology  Consult performed by: ANGEL Tripp  Consult ordered by: Rylie Landa PA-C         Subjective:     Chief Complaint:    Chief Complaint   Patient presents with    Facial Pain     Steady gait to triage c/o R side facial pain radiating to R arm. Denies injury/trauma/fall.           HPI:   Demetrice Barrett is a 77 y.o. female, with past medical history of DM, HTN, HLD, obesity, dementia, who presented to [facility] on 10/13/2022 with reports of right sided facial droop and right arm weakness/pain x 2 weeks.  CTh showed no acute abnormality.  She was admitted to Hospitalist service and neurology was consulted for stroke workup.       Past Medical History:   Diagnosis Date    COVID-19 07/08/2020    Diabetes mellitus type II, uncontrolled     HTN (hypertension)     Hypercholesterolemia     Internal hemorrhoids     Obesity     Tubular adenoma of colon        Past Surgical History:   Procedure Laterality Date    COLONOSCOPY N/A 12/04/2020    COLONOSCOPY W/ BIOPSIES AND POLYPECTOMY      TOTAL ABDOMINAL HYSTERECTOMY         Review of patient's allergies indicates:  No Known Allergies      No current facility-administered medications on file prior to encounter.     Current Outpatient Medications on File Prior to Encounter   Medication Sig    amLODIPine (NORVASC) 10 MG tablet Take 10 mg by mouth once daily at 6am.    glipiZIDE (GLUCOTROL) 5 MG tablet TAKE 1 TABLET TWICE DAILY    metFORMIN (GLUCOPHAGE) 850 MG tablet TAKE 1 TABLET TWICE DAILY    alendronate (FOSAMAX) 70 MG tablet TAKE 1 TABLET EVERY WEEK AS  DIRECTED;  SEE PACKAGE FOR ADDITIONAL INSTRUCTIONS    donepeziL (ARICEPT) 5 MG tablet 5 mg PO q PM x 1 month, then increase to 5 mg BID (Patient not taking: Reported on 8/17/2022)    enalapril (VASOTEC) 20 MG tablet TAKE 1 TABLET EVERY DAY    pioglitazone (ACTOS) 30 MG tablet TAKE 1 TABLET EVERY DAY    rosuvastatin (CRESTOR) 20 MG tablet Take 20 mg by mouth once daily at 6am.    TRUEPLUS LANCETS 33 gauge Misc TEST BLOOD SUGAR TWICE DAILY     Family History       Problem Relation (Age of Onset)    Anemia Sister    Diabetes Sister    Gout Mother    Heart disease Sister    Hyperlipidemia Mother    Hypertension Mother, Sister    Kidney disease Mother, Sister    Stroke Sister          Tobacco Use    Smoking status: Never    Smokeless tobacco: Never   Substance and Sexual Activity    Alcohol use: Never    Drug use: Never    Sexual activity: Not on file     Review of Systems   All other systems reviewed and are negative.    Objective:     Vital Signs (Most Recent):  Temp: 97.9 °F (36.6 °C) (10/14/22 1011)  Pulse: 64 (10/14/22 1011)  Resp: 17 (10/14/22 0833)  BP: (!) 167/64 (10/14/22 1011)  SpO2: 99 % (10/14/22 1011)   Vital Signs (24h Range):  Temp:  [97.5 °F (36.4 °C)-97.9 °F (36.6 °C)] 97.9 °F (36.6 °C)  Pulse:  [53-75] 64  Resp:  [11-20] 17  SpO2:  [99 %-100 %] 99 %  BP: (119-186)/(51-84) 167/64     Weight: 72.6 kg (160 lb)  Body mass index is 30.23 kg/m².    Physical Exam  Constitutional:       General: She is not in acute distress.     Appearance: She is not ill-appearing.   Eyes:      General: No visual field deficit.     Extraocular Movements: Extraocular movements intact.      Pupils: Pupils are equal, round, and reactive to light.   Cardiovascular:      Rate and Rhythm: Normal rate and regular rhythm.   Pulmonary:      Effort: Pulmonary effort is normal.   Skin:     General: Skin is warm and dry.   Neurological:      Mental Status: She is alert. Mental status is at baseline.      Cranial Nerves: No dysarthria or  facial asymmetry.      Sensory: Sensation is intact.      Motor: Motor function is intact.      Coordination: Coordination is intact.   Psychiatric:         Mood and Affect: Mood normal.         Behavior: Behavior normal.       Significant Labs: BMP:   Recent Labs   Lab 10/13/22  1417 10/14/22  0424    138   K 4.0 4.2   CO2 27 26   BUN 11.1 12.7   CREATININE 0.89 0.94   CALCIUM 9.8 9.3   MG  --  1.80     CBC:   Recent Labs   Lab 10/13/22  1417 10/14/22  0424   WBC 7.8 7.5   HGB 12.8 11.5*   HCT 39.6 37.0    241       Significant Imaging: I have reviewed all pertinent imaging results/findings within the past 24 hours.    Assessment and Plan:     Right sided weakness  Right sided weakness x2 weeks   Now back to baseline    MRI negative for acute stroke  Continue ASA 81mg daily  Continue Atorvastatin 40mg daily  PT/OT/ST evaluated and signed off    EEG ordered  Consider MRI C-spine ... can be done as an outpatient  EMG in clinic with Dr Harris       Antithrombotics for secondary stroke prevention: Antiplatelets: Aspirin: 81 mg daily    Statins for secondary stroke prevention and hyperlipidemia, if present: Statins: Atorvastatin- 40 mg daily    Aggressive risk factor modification: HTN, DM, HLD     Rehab efforts: The patient has been evaluated by a stroke team provider and the therapy needs have been fully considered based off the presenting complaints and exam findings. The following therapy evaluations are needed: None    Diagnostics ordered/pending: None   Stroke workup negative for acute infarct.  -CTh: no acute intracranial abnormality  -CTA h/n: No evidence of vascular occlusion or significant stenosis or aneurysm seen  -MRI brain: no acute infarct; chronic age related changes  -ECHO: EF 55%, bubble study negative, normal LA  -CUS: negative  -LDL: 52  -A1c: 7.2  -TSH: 1.3417  -on Rosuvastatin 20mg daily    VTE prophylaxis: Mechanical prophylaxis: Place SCDs    BP parameters: normalize blood  pressure            Thank you for your consult. Further recommendations may follow by MD.      Irene Snyder, ANGEL  Neurology  [facility] - 9th Floor Med Surg

## 2022-10-14 NOTE — PT/OT/SLP EVAL
Occupational Therapy   Evaluation and Discharge Note    Name: Demetrice Barrett  MRN: [id number]  Admitting Diagnosis:  <principal problem not specified>   Recent Surgery: * No surgery found *      Recommendations:     Discharge Recommendations: home  Discharge Equipment Recommendations:  none  Barriers to discharge:       Assessment:     Demetrice Barrett is a 77 y.o. female with a medical diagnosis of R hemiplegia. At this time, patient is functioning at their prior level of function and does not require further acute OT services.     Plan:     During this hospitalization, patient does not require further acute OT services.  Please re-consult if situation changes.    Plan of Care Reviewed with: patient, daughter    Subjective     Chief Complaint: --  Patient/Family Comments/goals: --    Occupational Profile:  Living Environment: lives alone   Previous level of function: indep  Roles and Routines: cooks/unemployed/drives occasionally  Equipment Used at home:  none  Assistance upon Discharge: family who lives nearby     Pain/Comfort:       Patients cultural, spiritual, Hinduism conflicts given the current situation:      Objective:     Communicated with: nrsg prior to session.  Patient found HOB elevated with   upon OT entry to room.    General Precautions: Standard,     Orthopedic Precautions:    Braces:    Respiratory Status: Room air     Occupational Performance:    Bed Mobility:    Patient completed Supine to Sit with independence  Patient completed Sit to Supine with independence    Functional Mobility/Transfers:  Patient completed Sit <> Stand Transfer with independence  with  no assistive device   Patient completed Bed <> Chair Transfer using Step Transfer technique with independence with no assistive device  Patient completed Toilet Transfer Step Transfer technique with independence with  no AD  Functional Mobility: no LOB; no deficits observed with functional mobility     Activities of Daily Living:  Upper Body  Dressing: independence .  Lower Body Dressing: independence .  Toileting: independence .    Cognitive/Visual Perceptual:  Cognitive/Psychosocial Skills:     -       Oriented to: Person, Place, Time, and Situation   Visual/Perceptual:      -Intact .    Physical Exam:  Upper Extremity Strength:    -       Right Upper Extremity: WNL  -       Left Upper Extremity: WNL  Fine Motor Coordination:    -       Intact    AMPAC 6 Click ADL:  AMPAC Total Score:      Treatment & Education:  --    Patient left HOB elevated with all lines intact, call button in reach, and daughter present    GOALS:   Multidisciplinary Problems       Occupational Therapy Goals       Not on file                    History:     Past Medical History:   Diagnosis Date    COVID-19 07/08/2020    Diabetes mellitus type II, uncontrolled     HTN (hypertension)     Hypercholesterolemia     Internal hemorrhoids     Obesity     Tubular adenoma of colon          Past Surgical History:   Procedure Laterality Date    COLONOSCOPY N/A 12/04/2020    COLONOSCOPY W/ BIOPSIES AND POLYPECTOMY      TOTAL ABDOMINAL HYSTERECTOMY         Time Tracking:     OT Date of Treatment:    OT Start Time: 1043  OT Stop Time: 1055  OT Total Time (min): 12 min    Billable Minutes:Evaluation Low Complexity     10/14/2022

## 2022-10-14 NOTE — PLAN OF CARE
Patient lives alone in a mobile home, granddaughter lives next door and assists patient in care as needed. Patient denies any use of DME. Patient was independent prior to admission. Family and patient deny any current needs at this time.

## 2022-10-14 NOTE — SUBJECTIVE & OBJECTIVE
Past Medical History:   Diagnosis Date    COVID-19 07/08/2020    Diabetes mellitus type II, uncontrolled     HTN (hypertension)     Hypercholesterolemia     Internal hemorrhoids     Obesity     Tubular adenoma of colon        Past Surgical History:   Procedure Laterality Date    COLONOSCOPY N/A 12/04/2020    COLONOSCOPY W/ BIOPSIES AND POLYPECTOMY      TOTAL ABDOMINAL HYSTERECTOMY         Review of patient's allergies indicates:  No Known Allergies      No current facility-administered medications on file prior to encounter.     Current Outpatient Medications on File Prior to Encounter   Medication Sig    amLODIPine (NORVASC) 10 MG tablet Take 10 mg by mouth once daily at 6am.    glipiZIDE (GLUCOTROL) 5 MG tablet TAKE 1 TABLET TWICE DAILY    metFORMIN (GLUCOPHAGE) 850 MG tablet TAKE 1 TABLET TWICE DAILY    alendronate (FOSAMAX) 70 MG tablet TAKE 1 TABLET EVERY WEEK AS DIRECTED;  SEE PACKAGE FOR ADDITIONAL INSTRUCTIONS    donepeziL (ARICEPT) 5 MG tablet 5 mg PO q PM x 1 month, then increase to 5 mg BID (Patient not taking: Reported on 8/17/2022)    enalapril (VASOTEC) 20 MG tablet TAKE 1 TABLET EVERY DAY    pioglitazone (ACTOS) 30 MG tablet TAKE 1 TABLET EVERY DAY    rosuvastatin (CRESTOR) 20 MG tablet Take 20 mg by mouth once daily at 6am.    TRUEPLUS LANCETS 33 gauge Misc TEST BLOOD SUGAR TWICE DAILY     Family History       Problem Relation (Age of Onset)    Anemia Sister    Diabetes Sister    Gout Mother    Heart disease Sister    Hyperlipidemia Mother    Hypertension Mother, Sister    Kidney disease Mother, Sister    Stroke Sister          Tobacco Use    Smoking status: Never    Smokeless tobacco: Never   Substance and Sexual Activity    Alcohol use: Never    Drug use: Never    Sexual activity: Not on file     Review of Systems   All other systems reviewed and are negative.    Objective:     Vital Signs (Most Recent):  Temp: 97.9 °F (36.6 °C) (10/14/22 1011)  Pulse: 64 (10/14/22 1011)  Resp: 17 (10/14/22  0833)  BP: (!) 167/64 (10/14/22 1011)  SpO2: 99 % (10/14/22 1011)   Vital Signs (24h Range):  Temp:  [97.5 °F (36.4 °C)-97.9 °F (36.6 °C)] 97.9 °F (36.6 °C)  Pulse:  [53-75] 64  Resp:  [11-20] 17  SpO2:  [99 %-100 %] 99 %  BP: (119-186)/(51-84) 167/64     Weight: 72.6 kg (160 lb)  Body mass index is 30.23 kg/m².    Physical Exam  Constitutional:       General: She is not in acute distress.     Appearance: She is not ill-appearing.   Eyes:      General: No visual field deficit.     Extraocular Movements: Extraocular movements intact.      Pupils: Pupils are equal, round, and reactive to light.   Cardiovascular:      Rate and Rhythm: Normal rate and regular rhythm.   Pulmonary:      Effort: Pulmonary effort is normal.   Skin:     General: Skin is warm and dry.   Neurological:      Mental Status: She is alert. Mental status is at baseline.      Cranial Nerves: No dysarthria or facial asymmetry.      Sensory: Sensation is intact.      Motor: Motor function is intact.      Coordination: Coordination is intact.   Psychiatric:         Mood and Affect: Mood normal.         Behavior: Behavior normal.       Significant Labs: BMP:   Recent Labs   Lab 10/13/22  1417 10/14/22  0424    138   K 4.0 4.2   CO2 27 26   BUN 11.1 12.7   CREATININE 0.89 0.94   CALCIUM 9.8 9.3   MG  --  1.80     CBC:   Recent Labs   Lab 10/13/22  1417 10/14/22  0424   WBC 7.8 7.5   HGB 12.8 11.5*   HCT 39.6 37.0    241       Significant Imaging: I have reviewed all pertinent imaging results/findings within the past 24 hours.

## 2022-10-14 NOTE — PROGRESS NOTES
[facility]  Hospital Medicine Progress Note        Chief Complaint: Inpatient Follow-up for rt sided weakness    HPI:   Demetrice Barrett is a 77 y.o. Black or  female with a past medical history of hypertension, hyperlipidemia, diabetes mellitus 2 and dementia. The patient presented to [facility] on 10/13/2022 with a primary complaint of swelling to the right-sided of the face and numbness and weakness to the right arm and leg for the last 2 weeks. Patient reports weakness has resulted in her being off balance when walking. She denies complaints of headache, vision changes, facial numbness, shortness of breath, chest pain, nausea, vomiting.  She has had several episodes of diarrhea.  Daughter at bedside denies facial droop or slurred speech.  Patient lives alone, ambulates without assistance and completes activities of daily living independently.     Upon presentation to the ED, patient afebrile, hypertensive 154/68 and SpO2 98% room air.  CBC and CMP within limits.  Troponin less than 0.01.  EKG sinus bradycardia with a ventricular rate of 52.  CT head without acute abnormalities.  Neurology consult.  Patient is admitted to Hospital Medicine Services for further medical management.    Neurology was consulted.  Stroke workup was initiated.  MRI brain showed no acute findings.  CTA head was unremarkable for any stenosis.    Interval Hx:   No acute events overnight.  She was alert, comfortably resting in the bed.  Family member was at bedside.  She tolerated PT well.  Right-sided weakness improving.  Ambulating without any support.  TTE read pending  Objective/physical exam:  Vitals:    10/14/22 0158 10/14/22 0258 10/14/22 0358 10/14/22 0458   BP: (!) 124/51 (!) 119/59 (!) 155/69 (!) 145/69   Pulse: 70 (!) 59 63 63   Resp: 17 15 16 15   Temp:       TempSrc:       SpO2: 100% 99% 100% 100%   Weight:       Height:         General: In no acute distress, afebrile  Respiratory: Clear  to auscultation bilaterally  Cardiovascular: S1, S2, no appreciable murmur  Abdomen: Soft, nontender, BS +  MSK: Warm, no lower extremity edema, no clubbing or cyanosis  Neurologic: Alert and oriented x4, moving all extremities with good strength     Lab Results   Component Value Date     10/14/2022    K 4.2 10/14/2022    CO2 26 10/14/2022    BUN 12.7 10/14/2022    CREATININE 0.94 10/14/2022    CALCIUM 9.3 10/14/2022    EGFRNONAA 67 10/20/2021      Lab Results   Component Value Date    ALT 13 10/14/2022    AST 15 10/14/2022    ALKPHOS 57 10/14/2022    BILITOT 0.3 10/14/2022      Lab Results   Component Value Date    WBC 7.5 10/14/2022    HGB 11.5 (L) 10/14/2022    HCT 37.0 10/14/2022    MCV 94.4 (H) 10/14/2022     10/14/2022           Medications:   amLODIPine  10 mg Oral Daily    aspirin  81 mg Oral Daily    atorvastatin  40 mg Oral QHS    donepeziL  5 mg Oral BID    glipiZIDE  5 mg Oral BID    lisinopriL  5 mg Oral Daily      dextrose 10 % in water (D10W), glucagon (human recombinant), insulin aspart U-100, labetalol, sodium chloride 0.9%     Assessment/Plan:    Right hemiplegia, CVA  HTN  Hyperlipidemia  Diabetes mellitus type 2   Dementia, likely vascular type    Plan:   -imaging unremarkable.  MRI negative  -resume home antihypertensives   -other home medications will be reviewed    DC to home once cleared from Neurology    Cheng Cage MD

## 2022-10-14 NOTE — ED NOTES
Assumed care of patient at this time. Patient ambulatory to bathroom with steady gait, denies needs. NAD noted, monitors in progress.

## 2022-10-14 NOTE — PT/OT/SLP EVAL
Physical Therapy Evaluation and Discharge Note    Patient Name:  Demetrice Barrett   MRN:  [id number]    Recommendations:     Discharge Recommendations:  home   Discharge Equipment Recommendations: none   Barriers to discharge: None    Assessment:     Demetrice Barrett is a 77 y.o. female admitted with a medical diagnosis of <principal problem not specified>. .  At this time, patient is functioning at their prior level of function and does not require further acute PT services.     Recent Surgery: * No surgery found *      Plan:     During this hospitalization, patient does not require further acute PT services.  Please re-consult if situation changes.      Subjective     Chief Complaint: NONE  Patient/Family Comments/goals: none  Pain/Comfort:  Pain Rating 1: 0/10    Patients cultural, spiritual, Buddhist conflicts given the current situation: no    Living Environment:  Lives alone in [place] with 3 steps to enter.   Prior to admission, patients level of function was independent.  Equipment used at home: none.  DME owned (not currently used): none.  Upon discharge, patient will have assistance from no one.    Objective:     Communicated with nurse prior to session.  Patient found supine with telemetry upon PT entry to room.    General Precautions: Standard,     Orthopedic Precautions:N/A   Braces: N/A   Respiratory Status: Room air    Exams:  Cognitive Exam:  Patient is oriented to Person, Place, Time, and Situation  Sensation:    -       Intact  RLE ROM: WNL  RLE Strength: WNL  LLE ROM: WNL  LLE Strength: WNL    Functional Mobility:  Bed Mobility:     Supine to Sit: independence  Sit to Supine: independence  Transfers:     Sit to Stand:  independence with no AD  Gait: 211 ft independently  Balance: good    AM-PAC 6 CLICK MOBILITY  Total Score:24     AM-PAC 6 CLICK MOBILITY  Total Score:24     Patient left supine with all lines intact, call button in reach, and family present.    GOALS:   Multidisciplinary Problems        Physical Therapy Goals       Not on file                    History:     Past Medical History:   Diagnosis Date    COVID-19 07/08/2020    Diabetes mellitus type II, uncontrolled     HTN (hypertension)     Hypercholesterolemia     Internal hemorrhoids     Obesity     Tubular adenoma of colon        Past Surgical History:   Procedure Laterality Date    COLONOSCOPY N/A 12/04/2020    COLONOSCOPY W/ BIOPSIES AND POLYPECTOMY      TOTAL ABDOMINAL HYSTERECTOMY         Time Tracking:     PT Received On: 10/14/22  PT Start Time: 1105     PT Stop Time: 1115  PT Total Time (min): 10 min     Billable Minutes: Evaluation 10 minutes      10/14/2022

## 2022-10-14 NOTE — ED NOTES
Pt ambulated to restroom with steady gait, nadn, respirations full and even. Daughter at bedside. Will continue to monitor.

## 2022-10-14 NOTE — HPI
Demetrice Barrett is a 77 y.o. female, with past medical history of DM, HTN, HLD, obesity, dementia, who presented to [facility] on 10/13/2022 with reports of right sided facial droop and right arm weakness/pain x 2 weeks.  CTh showed no acute abnormality.  She was admitted to Hospitalist service and neurology was consulted for stroke workup.

## 2022-10-14 NOTE — ASSESSMENT & PLAN NOTE
Right sided weakness x2 weeks   Now back to baseline    MRI negative for acute stroke  Continue ASA 81mg daily  Continue Atorvastatin 40mg daily  PT/OT/ST evaluated and signed off      Antithrombotics for secondary stroke prevention: Antiplatelets: Aspirin: 81 mg daily    Statins for secondary stroke prevention and hyperlipidemia, if present: Statins: Atorvastatin- 40 mg daily    Aggressive risk factor modification: HTN, DM, HLD     Rehab efforts: The patient has been evaluated by a stroke team provider and the therapy needs have been fully considered based off the presenting complaints and exam findings. The following therapy evaluations are needed: None    Diagnostics ordered/pending: None   Stroke workup negative for acute infarct.  -CTh: no acute intracranial abnormality  -CTA h/n: No evidence of vascular occlusion or significant stenosis or aneurysm seen  -MRI brain: no acute infarct; chronic age related changes  -ECHO: EF 55%, bubble study negative, normal LA  -CUS: negative  -LDL: 52  -A1c: 7.2  -TSH: 1.3417  -on Rosuvastatin 20mg daily    VTE prophylaxis: Mechanical prophylaxis: Place SCDs    BP parameters: normalize blood pressure

## 2022-10-15 VITALS
TEMPERATURE: 98 F | HEIGHT: 61 IN | SYSTOLIC BLOOD PRESSURE: 148 MMHG | BODY MASS INDEX: 30.21 KG/M2 | HEART RATE: 66 BPM | DIASTOLIC BLOOD PRESSURE: 70 MMHG | WEIGHT: 160 LBS | OXYGEN SATURATION: 98 % | RESPIRATION RATE: 16 BRPM

## 2022-10-15 LAB
ALBUMIN SERPL-MCNC: 3.6 GM/DL (ref 3.4–4.8)
ALBUMIN/GLOB SERPL: 1.4 RATIO (ref 1.1–2)
ALP SERPL-CCNC: 60 UNIT/L (ref 40–150)
ALT SERPL-CCNC: 12 UNIT/L (ref 0–55)
AST SERPL-CCNC: 13 UNIT/L (ref 5–34)
BASOPHILS # BLD AUTO: 0.04 X10(3)/MCL (ref 0–0.2)
BASOPHILS NFR BLD AUTO: 0.6 %
BILIRUBIN DIRECT+TOT PNL SERPL-MCNC: 0.3 MG/DL
BUN SERPL-MCNC: 9.2 MG/DL (ref 9.8–20.1)
CALCIUM SERPL-MCNC: 9.1 MG/DL (ref 8.4–10.2)
CHLORIDE SERPL-SCNC: 108 MMOL/L (ref 98–107)
CO2 SERPL-SCNC: 26 MMOL/L (ref 23–31)
CREAT SERPL-MCNC: 0.81 MG/DL (ref 0.55–1.02)
EOSINOPHIL # BLD AUTO: 0.07 X10(3)/MCL (ref 0–0.9)
EOSINOPHIL NFR BLD AUTO: 1.1 %
ERYTHROCYTE [DISTWIDTH] IN BLOOD BY AUTOMATED COUNT: 13.4 % (ref 11.5–17)
GFR SERPLBLD CREATININE-BSD FMLA CKD-EPI: >60 MLS/MIN/1.73/M2
GLOBULIN SER-MCNC: 2.5 GM/DL (ref 2.4–3.5)
GLUCOSE SERPL-MCNC: 125 MG/DL (ref 82–115)
HCT VFR BLD AUTO: 35.4 % (ref 37–47)
HGB BLD-MCNC: 11.6 GM/DL (ref 12–16)
IMM GRANULOCYTES # BLD AUTO: 0.01 X10(3)/MCL (ref 0–0.04)
IMM GRANULOCYTES NFR BLD AUTO: 0.2 %
LYMPHOCYTES # BLD AUTO: 2.5 X10(3)/MCL (ref 0.6–4.6)
LYMPHOCYTES NFR BLD AUTO: 38 %
MCH RBC QN AUTO: 30.1 PG (ref 27–31)
MCHC RBC AUTO-ENTMCNC: 32.8 MG/DL (ref 33–36)
MCV RBC AUTO: 91.7 FL (ref 80–94)
MONOCYTES # BLD AUTO: 0.46 X10(3)/MCL (ref 0.1–1.3)
MONOCYTES NFR BLD AUTO: 7 %
NEUTROPHILS # BLD AUTO: 3.5 X10(3)/MCL (ref 2.1–9.2)
NEUTROPHILS NFR BLD AUTO: 53.1 %
NRBC BLD AUTO-RTO: 0 %
PLATELET # BLD AUTO: 269 X10(3)/MCL (ref 130–400)
PMV BLD AUTO: 10.5 FL (ref 7.4–10.4)
POCT GLUCOSE: 128 MG/DL (ref 70–110)
POCT GLUCOSE: 129 MG/DL (ref 70–110)
POCT GLUCOSE: 213 MG/DL (ref 70–110)
POCT GLUCOSE: 236 MG/DL (ref 70–110)
POTASSIUM SERPL-SCNC: 3.9 MMOL/L (ref 3.5–5.1)
PROT SERPL-MCNC: 6.1 GM/DL (ref 5.8–7.6)
RBC # BLD AUTO: 3.86 X10(6)/MCL (ref 4.2–5.4)
SODIUM SERPL-SCNC: 142 MMOL/L (ref 136–145)
WBC # SPEC AUTO: 6.6 X10(3)/MCL (ref 4.5–11.5)

## 2022-10-15 PROCEDURE — G0378 HOSPITAL OBSERVATION PER HR: HCPCS

## 2022-10-15 PROCEDURE — 36415 COLL VENOUS BLD VENIPUNCTURE: CPT | Performed by: PHYSICIAN ASSISTANT

## 2022-10-15 PROCEDURE — 85025 COMPLETE CBC W/AUTO DIFF WBC: CPT | Performed by: PHYSICIAN ASSISTANT

## 2022-10-15 PROCEDURE — 96372 THER/PROPH/DIAG INJ SC/IM: CPT | Performed by: PHYSICIAN ASSISTANT

## 2022-10-15 PROCEDURE — 63600175 PHARM REV CODE 636 W HCPCS: Performed by: PHYSICIAN ASSISTANT

## 2022-10-15 PROCEDURE — 25000003 PHARM REV CODE 250: Performed by: INTERNAL MEDICINE

## 2022-10-15 PROCEDURE — 80053 COMPREHEN METABOLIC PANEL: CPT | Performed by: PHYSICIAN ASSISTANT

## 2022-10-15 RX ORDER — NAPROXEN SODIUM 220 MG/1
81 TABLET, FILM COATED ORAL DAILY
Refills: 0
Start: 2022-10-16 | End: 2023-07-17 | Stop reason: ALTCHOICE

## 2022-10-15 RX ADMIN — INSULIN ASPART 2 UNITS: 100 INJECTION, SOLUTION INTRAVENOUS; SUBCUTANEOUS at 12:10

## 2022-10-15 RX ADMIN — LISINOPRIL 5 MG: 5 TABLET ORAL at 08:10

## 2022-10-15 RX ADMIN — INSULIN ASPART 4 UNITS: 100 INJECTION, SOLUTION INTRAVENOUS; SUBCUTANEOUS at 12:10

## 2022-10-15 RX ADMIN — AMLODIPINE BESYLATE 10 MG: 5 TABLET ORAL at 08:10

## 2022-10-15 RX ADMIN — DONEPEZIL HYDROCHLORIDE 5 MG: 5 TABLET, FILM COATED ORAL at 08:10

## 2022-10-15 RX ADMIN — GLIPIZIDE 5 MG: 5 TABLET ORAL at 08:10

## 2022-10-15 RX ADMIN — ASPIRIN 81 MG CHEWABLE TABLET 81 MG: 81 TABLET CHEWABLE at 08:10

## 2022-10-15 NOTE — DISCHARGE SUMMARY
[facility] Hospital Medicine Discharge Summary    Admit Date: 10/13/2022  Discharge Date and Time: 10/15/05284:01 PM  Admitting Physician:  Team  Discharging Physician: Waldo Lindsay MD.  Primary Care Physician: Naz Lugo MD  Consults: Neurology    Discharge Diagnoses:  Right sided weakness: Resolved ?TIA  HTN  Hyperlipidemia  Diabetes mellitus type 2   Dementia, likely vascular type    Hospital Course:   Demetrice Barrett is a 77 y.o. Black or  female with a past medical history of hypertension, hyperlipidemia, diabetes mellitus 2 and dementia. The patient presented to [facility] on 10/13/2022 with a primary complaint of swelling to the right-sided of the face and numbness and weakness to the right arm and leg for the last 2 weeks. Patient reports weakness has resulted in her being off balance when walking. She denies complaints of headache, vision changes, facial numbness, shortness of breath, chest pain, nausea, vomiting.  She has had several episodes of diarrhea.  Daughter at bedside denies facial droop or slurred speech.  Patient lives alone, ambulates without assistance and completes activities of daily living independently.     Upon presentation to the ED, patient afebrile, hypertensive 154/68 and SpO2 98% room air.  CBC and CMP within limits.  Troponin less than 0.01.  EKG sinus bradycardia with a ventricular rate of 52.  CT head without acute abnormalities.  Neurology consult.  Patient is admitted to Hospital Medicine Services for further medical management.     Neurology was consulted.  Stroke workup was initiated.  MRI brain showed no acute findings.  CTA head was unremarkable for any stenosis. Her weakness resolved. She was eating and ambulating well. Started on ASA and She was discharged with  for PT.     Pt was seen and examined on the day of discharge  Vitals:  VITAL SIGNS: 24 HRS MIN & MAX LAST   Temp  Min: 97.2 °F (36.2 °C)  Max: 98.9 °F (37.2  °C) 97.9 °F (36.6 °C)   BP  Min: 145/69  Max: 160/77 (!) 148/70     Pulse  Min: 56  Max: 88  66   Resp  Min: 16  Max: 18 16   SpO2  Min: 98 %  Max: 100 % 98 %       Physical Exam:  Heart RRR  Lungs clear   Abdomen soft and non tender   No FND     Procedures Performed: No admission procedures for hospital encounter.     Significant Diagnostic Studies: See Full reports for all details    Recent Labs   Lab 10/13/22  1417 10/14/22  0424 10/15/22  0523   WBC 7.8 7.5 6.6   RBC 4.24 3.92* 3.86*   HGB 12.8 11.5* 11.6*   HCT 39.6 37.0 35.4*   MCV 93.4 94.4* 91.7   MCH 30.2 29.3 30.1   MCHC 32.3* 31.1* 32.8*   RDW 13.7 13.7 13.4    241 269   MPV 10.3 10.3 10.5*       Recent Labs   Lab 10/13/22  1417 10/14/22  0424 10/15/22  0523    138 142   K 4.0 4.2 3.9   CO2 27 26 26   BUN 11.1 12.7 9.2*   CREATININE 0.89 0.94 0.81   CALCIUM 9.8 9.3 9.1   MG  --  1.80  --    ALBUMIN 4.1 3.6 3.6   ALKPHOS 63 57 60   ALT 12 13 12   AST 17 15 13   BILITOT 0.4 0.3 0.3        Microbiology Results (last 7 days)       ** No results found for the last 168 hours. **             Echo Saline Bubble? Yes  · There is no evidence of intracardiac shunting. Negative Bubble study.  · Mild concentric hypertrophy and normal systolic function.  · The estimated ejection fraction is 55%.  · Grade I left ventricular diastolic dysfunction.  · Normal right ventricular size with normal right ventricular systolic   function.  · Aortic valve sclerosis without stenosis.  · Mild-to-moderate aortic regurgitation.  · Mild tricuspid regurgitation.     CV Ultrasound Bilateral Doppler Carotid  Bilateral ICA no significant flow limiting lesion noted.  Bilateral antegrade vertebral flow.         Medication List        START taking these medications      aspirin 81 MG Chew  Take 1 tablet (81 mg total) by mouth once daily.  Start taking on: October 16, 2022     donepeziL 5 MG tablet  Commonly known as: ARICEPT  5 mg PO q PM x 1 month, then increase to 5 mg BID             CONTINUE taking these medications      alendronate 70 MG tablet  Commonly known as: FOSAMAX  TAKE 1 TABLET EVERY WEEK AS DIRECTED;  SEE PACKAGE FOR ADDITIONAL INSTRUCTIONS     amLODIPine 10 MG tablet  Commonly known as: NORVASC     enalapril 20 MG tablet  Commonly known as: VASOTEC  TAKE 1 TABLET EVERY DAY     glipiZIDE 5 MG tablet  Commonly known as: GLUCOTROL  TAKE 1 TABLET TWICE DAILY     metFORMIN 850 MG tablet  Commonly known as: GLUCOPHAGE  TAKE 1 TABLET TWICE DAILY     pioglitazone 30 MG tablet  Commonly known as: ACTOS  TAKE 1 TABLET EVERY DAY     rosuvastatin 20 MG tablet  Commonly known as: CRESTOR     TRUEPLUS LANCETS 33 gauge Misc  Generic drug: lancets  TEST BLOOD SUGAR TWICE DAILY               Where to Get Your Medications        Information about where to get these medications is not yet available    Ask your nurse or doctor about these medications  aspirin 81 MG Chew          Explained in detail to the patient about the discharge plan, medications, and follow-up visits. Pt understands and agrees with the treatment plan  [place] [facility] [place] Condition: stable  Diet-   Dietary Orders (From admission, onward)       Start     Ordered    10/13/22 2252  Diet diabetic  Diet effective now         10/13/22 2251                   Medications Per MO med rec  Activities as tolerated   Follow-up Information       Naz Lugo MD Follow up in 1 week(s).    Specialty: Internal Medicine  Contact information:  [address] [place] [address] [phone]                           For further questions contact hospitalist office    Discharge time 33 minutes    For worsening symptoms, chest pain, shortness of breath, increased abdominal pain, high grade fever, stroke or stroke like symptoms, immediately go to the nearest Emergency Room or call 911 as soon as possible.      Waldo Miller M.D on 10/15/2022. at 2:01 PM.

## 2022-10-24 DIAGNOSIS — R41.3 MEMORY LOSS: Primary | ICD-10-CM

## 2022-10-24 RX ORDER — DONEPEZIL HYDROCHLORIDE 5 MG/1
5 TABLET, FILM COATED ORAL 2 TIMES DAILY
Qty: 180 TABLET | Refills: 2 | Status: SHIPPED | OUTPATIENT
Start: 2022-10-24 | End: 2023-07-17

## 2022-11-10 ENCOUNTER — TELEPHONE (OUTPATIENT)
Dept: PRIMARY CARE CLINIC | Facility: CLINIC | Age: 77
End: 2022-11-10
Payer: MEDICARE

## 2022-11-10 NOTE — TELEPHONE ENCOUNTER
Pt confirmed appt for Thursday November 17, 2022 at 11 am. Advised pt to bring insurance card, ID, and medication list. Pt voiced understanding.

## 2022-12-02 ENCOUNTER — HOSPITAL ENCOUNTER (EMERGENCY)
Facility: HOSPITAL | Age: 77
Discharge: HOME | End: 2022-12-03
Attending: FAMILY MEDICINE
Payer: MEDICARE

## 2022-12-02 DIAGNOSIS — N39.0 URINARY TRACT INFECTION WITHOUT HEMATURIA, SITE UNSPECIFIED: ICD-10-CM

## 2022-12-02 DIAGNOSIS — K59.00 CONSTIPATION, UNSPECIFIED CONSTIPATION TYPE: Primary | ICD-10-CM

## 2022-12-02 LAB
ALBUMIN SERPL-MCNC: 4.1 GM/DL (ref 3.4–4.8)
ALBUMIN/GLOB SERPL: 1.1 RATIO (ref 1.1–2)
ALP SERPL-CCNC: 73 UNIT/L (ref 40–150)
ALT SERPL-CCNC: 13 UNIT/L (ref 0–55)
APPEARANCE UR: CLEAR
AST SERPL-CCNC: 22 UNIT/L (ref 5–34)
BACTERIA #/AREA URNS AUTO: NORMAL /HPF
BASOPHILS # BLD AUTO: 0.02 X10(3)/MCL (ref 0–0.2)
BASOPHILS NFR BLD AUTO: 0.2 %
BILIRUB UR QL STRIP.AUTO: NEGATIVE MG/DL
BILIRUBIN DIRECT+TOT PNL SERPL-MCNC: 0.3 MG/DL
BUN SERPL-MCNC: 13.1 MG/DL (ref 9.8–20.1)
CALCIUM SERPL-MCNC: 9.8 MG/DL (ref 8.4–10.2)
CHLORIDE SERPL-SCNC: 106 MMOL/L (ref 98–107)
CO2 SERPL-SCNC: 23 MMOL/L (ref 23–31)
COLOR UR AUTO: YELLOW
CREAT SERPL-MCNC: 0.79 MG/DL (ref 0.55–1.02)
EOSINOPHIL # BLD AUTO: 0.08 X10(3)/MCL (ref 0–0.9)
EOSINOPHIL NFR BLD AUTO: 0.7 %
ERYTHROCYTE [DISTWIDTH] IN BLOOD BY AUTOMATED COUNT: 13.5 % (ref 11.5–17)
GFR SERPLBLD CREATININE-BSD FMLA CKD-EPI: >60 MLS/MIN/1.73/M2
GLOBULIN SER-MCNC: 3.9 GM/DL (ref 2.4–3.5)
GLUCOSE SERPL-MCNC: 163 MG/DL (ref 82–115)
GLUCOSE UR QL STRIP.AUTO: 250 MG/DL
HCT VFR BLD AUTO: 40.6 % (ref 37–47)
HGB BLD-MCNC: 12.8 GM/DL (ref 12–16)
IMM GRANULOCYTES # BLD AUTO: 0.01 X10(3)/MCL (ref 0–0.04)
IMM GRANULOCYTES NFR BLD AUTO: 0.1 %
KETONES UR QL STRIP.AUTO: ABNORMAL MG/DL
LEUKOCYTE ESTERASE UR QL STRIP.AUTO: ABNORMAL UNIT/L
LIPASE SERPL-CCNC: 43 U/L
LYMPHOCYTES # BLD AUTO: 2.9 X10(3)/MCL (ref 0.6–4.6)
LYMPHOCYTES NFR BLD AUTO: 25.7 %
MCH RBC QN AUTO: 29.5 PG (ref 27–31)
MCHC RBC AUTO-ENTMCNC: 31.5 MG/DL (ref 33–36)
MCV RBC AUTO: 93.5 FL (ref 80–94)
MONOCYTES # BLD AUTO: 0.81 X10(3)/MCL (ref 0.1–1.3)
MONOCYTES NFR BLD AUTO: 7.2 %
NEUTROPHILS # BLD AUTO: 7.5 X10(3)/MCL (ref 2.1–9.2)
NEUTROPHILS NFR BLD AUTO: 66.1 %
NITRITE UR QL STRIP.AUTO: NEGATIVE
PH UR STRIP.AUTO: 5.5 [PH]
PLATELET # BLD AUTO: 317 X10(3)/MCL (ref 130–400)
PMV BLD AUTO: 10.2 FL (ref 7.4–10.4)
POTASSIUM SERPL-SCNC: 4.6 MMOL/L (ref 3.5–5.1)
PROT SERPL-MCNC: 8 GM/DL (ref 5.8–7.6)
PROT UR QL STRIP.AUTO: NEGATIVE MG/DL
RBC # BLD AUTO: 4.34 X10(6)/MCL (ref 4.2–5.4)
RBC #/AREA URNS AUTO: NORMAL /HPF
RBC UR QL AUTO: NEGATIVE UNIT/L
SODIUM SERPL-SCNC: 142 MMOL/L (ref 136–145)
SP GR UR STRIP.AUTO: 1.02
SQUAMOUS #/AREA URNS AUTO: NORMAL /HPF
UROBILINOGEN UR STRIP-ACNC: 1 MG/DL
WBC # SPEC AUTO: 11.3 X10(3)/MCL (ref 4.5–11.5)
WBC #/AREA URNS AUTO: NORMAL /HPF

## 2022-12-02 PROCEDURE — 83690 ASSAY OF LIPASE: CPT | Performed by: FAMILY MEDICINE

## 2022-12-02 PROCEDURE — 81003 URINALYSIS AUTO W/O SCOPE: CPT | Performed by: FAMILY MEDICINE

## 2022-12-02 PROCEDURE — 25000003 PHARM REV CODE 250: Performed by: FAMILY MEDICINE

## 2022-12-02 PROCEDURE — 96361 HYDRATE IV INFUSION ADD-ON: CPT

## 2022-12-02 PROCEDURE — 96375 TX/PRO/DX INJ NEW DRUG ADDON: CPT

## 2022-12-02 PROCEDURE — 80053 COMPREHEN METABOLIC PANEL: CPT | Performed by: FAMILY MEDICINE

## 2022-12-02 PROCEDURE — 85025 COMPLETE CBC W/AUTO DIFF WBC: CPT | Performed by: FAMILY MEDICINE

## 2022-12-02 PROCEDURE — 96365 THER/PROPH/DIAG IV INF INIT: CPT

## 2022-12-02 PROCEDURE — 63600175 PHARM REV CODE 636 W HCPCS: Performed by: FAMILY MEDICINE

## 2022-12-02 PROCEDURE — 99285 EMERGENCY DEPT VISIT HI MDM: CPT | Mod: 25

## 2022-12-02 PROCEDURE — 81001 URINALYSIS AUTO W/SCOPE: CPT | Performed by: FAMILY MEDICINE

## 2022-12-02 RX ORDER — POLYETHYLENE GLYCOL 3350 17 G/17G
17 POWDER, FOR SOLUTION ORAL DAILY
Qty: 3 EACH | Refills: 0 | Status: SHIPPED | OUTPATIENT
Start: 2022-12-02 | End: 2022-12-03 | Stop reason: SDUPTHER

## 2022-12-02 RX ORDER — KETOROLAC TROMETHAMINE 30 MG/ML
15 INJECTION, SOLUTION INTRAMUSCULAR; INTRAVENOUS
Status: COMPLETED | OUTPATIENT
Start: 2022-12-02 | End: 2022-12-02

## 2022-12-02 RX ORDER — NITROFURANTOIN 25; 75 MG/1; MG/1
100 CAPSULE ORAL 2 TIMES DAILY
Qty: 10 CAPSULE | Refills: 0 | Status: SHIPPED | OUTPATIENT
Start: 2022-12-02 | End: 2022-12-03 | Stop reason: SDUPTHER

## 2022-12-02 RX ADMIN — KETOROLAC TROMETHAMINE 15 MG: 30 INJECTION, SOLUTION INTRAMUSCULAR at 11:12

## 2022-12-02 RX ADMIN — SODIUM CHLORIDE 500 ML: 9 INJECTION, SOLUTION INTRAVENOUS at 10:12

## 2022-12-02 RX ADMIN — CEFTRIAXONE SODIUM 1 G: 1 INJECTION, POWDER, FOR SOLUTION INTRAMUSCULAR; INTRAVENOUS at 11:12

## 2022-12-03 VITALS
RESPIRATION RATE: 18 BRPM | BODY MASS INDEX: 29.88 KG/M2 | TEMPERATURE: 99 F | HEIGHT: 64 IN | OXYGEN SATURATION: 99 % | SYSTOLIC BLOOD PRESSURE: 153 MMHG | DIASTOLIC BLOOD PRESSURE: 76 MMHG | WEIGHT: 175 LBS | HEART RATE: 69 BPM

## 2022-12-03 RX ORDER — POLYETHYLENE GLYCOL 3350 17 G/17G
17 POWDER, FOR SOLUTION ORAL DAILY
Qty: 3 EACH | Refills: 0 | Status: SHIPPED | OUTPATIENT
Start: 2022-12-03 | End: 2022-12-06

## 2022-12-03 RX ORDER — NITROFURANTOIN 25; 75 MG/1; MG/1
100 CAPSULE ORAL 2 TIMES DAILY
Qty: 10 CAPSULE | Refills: 0 | Status: SHIPPED | OUTPATIENT
Start: 2022-12-03 | End: 2022-12-10

## 2022-12-03 NOTE — ED NOTES
Pt AAOx4, reviewed discharge instructions with pt, stated understanding, explained that rx for macrobid and miralax sent to pharmacy, stated understanding, discharge instructions given to pt, pt ambulates with steady gait

## 2022-12-03 NOTE — ED PROVIDER NOTES
Encounter Date: 12/2/2022       History     Chief Complaint   Patient presents with    Flank Pain     PT REPORTS OF RIGHT SIDE FLANK PAIN THAT ALSO RADIATES TO LOWER BACK, DENIES ANY TROUBLE URINATING OR HEMATURIA.      77-year-old patient with right-sided flank pain and discomfort patient has otherwise no nausea no vomiting no fever chills or night sweats but is complaining of discomfort nothing makes it better nothing makes it worse patient does not have dysuria hematuria or frequency      Review of patient's allergies indicates:  No Known Allergies  Past Medical History:   Diagnosis Date    COVID-19 07/08/2020    Diabetes mellitus type II, uncontrolled     HTN (hypertension)     Hypercholesterolemia     Internal hemorrhoids     Obesity     Tubular adenoma of colon      Past Surgical History:   Procedure Laterality Date    COLONOSCOPY N/A 12/04/2020    COLONOSCOPY W/ BIOPSIES AND POLYPECTOMY      TOTAL ABDOMINAL HYSTERECTOMY       Family History   Problem Relation Age of Onset    Hyperlipidemia Mother     Hypertension Mother     Kidney disease Mother     Gout Mother     Stroke Sister     Hypertension Sister     Diabetes Sister     Heart disease Sister     Anemia Sister     Kidney disease Sister      Social History     Tobacco Use    Smoking status: Never    Smokeless tobacco: Never   Substance Use Topics    Alcohol use: Never    Drug use: Never     Review of Systems   Constitutional:  Negative for fever.   HENT:  Negative for sore throat.    Respiratory:  Negative for shortness of breath.    Cardiovascular:  Negative for chest pain.   Gastrointestinal:  Positive for abdominal pain. Negative for nausea.   Genitourinary:  Negative for dysuria.   Musculoskeletal:  Negative for back pain.   Skin:  Negative for rash.   Neurological:  Negative for weakness.   Hematological:  Does not bruise/bleed easily.   All other systems reviewed and are negative.    Physical Exam     Initial Vitals [12/02/22 2204]   BP Pulse Resp  Temp SpO2   (!) 175/88 (!) 120 (!) 22 98.6 °F (37 °C) 100 %      MAP       --         Physical Exam    Nursing note and vitals reviewed.  Constitutional: She appears well-developed.   HENT:   Head: Normocephalic and atraumatic.   Right Ear: External ear normal.   Left Ear: External ear normal.   Nose: Nose normal.   Mouth/Throat: Oropharynx is clear and moist. No oropharyngeal exudate.   Eyes: Conjunctivae and EOM are normal. Pupils are equal, round, and reactive to light. Right eye exhibits no discharge. Left eye exhibits no discharge.   Neck: Neck supple. No tracheal deviation present. No JVD present.   Normal range of motion.  Cardiovascular:  Normal rate, regular rhythm, normal heart sounds and intact distal pulses.     Exam reveals no gallop and no friction rub.       No murmur heard.  Pulmonary/Chest: Breath sounds normal. No stridor. No respiratory distress. She has no wheezes. She has no rhonchi. She has no rales.   Abdominal: Abdomen is soft. Bowel sounds are normal. She exhibits no distension and no mass. There is abdominal tenderness. There is no rebound and no guarding.   Musculoskeletal:         General: Normal range of motion.      Cervical back: Normal range of motion and neck supple.     Neurological: She is alert and oriented to person, place, and time. She has normal strength. No cranial nerve deficit.   Skin: Skin is warm and dry. No rash and no abscess noted. No erythema.   Psychiatric: She has a normal mood and affect. Her behavior is normal. Judgment and thought content normal.       ED Course   Procedures  Labs Reviewed   COMPREHENSIVE METABOLIC PANEL - Abnormal; Notable for the following components:       Result Value    Glucose Level 163 (*)     Protein Total 8.0 (*)     Globulin 3.9 (*)     All other components within normal limits   URINALYSIS, REFLEX TO URINE CULTURE - Abnormal; Notable for the following components:    Glucose,  (*)     Ketones, UA Trace (*)     Leukocyte Esterase,  UA Trace (*)     All other components within normal limits   CBC WITH DIFFERENTIAL - Abnormal; Notable for the following components:    MCHC 31.5 (*)     All other components within normal limits   LIPASE - Normal   URINALYSIS, MICROSCOPIC - Normal   CBC W/ AUTO DIFFERENTIAL    Narrative:     The following orders were created for panel order CBC W/ AUTO DIFFERENTIAL.  Procedure                               Abnormality         Status                     ---------                               -----------         ------                     CBC with Differential[[id number]]        Abnormal            Final result                 Please view results for these tests on the individual orders.          Imaging Results              CT Renal Stone Study ABD Pelvis WO (Preliminary result)  Result time 12/03/22 00:19:06      Preliminary result by Judd Henry MD (12/03/22 00:19:06)                   Narrative:    START OF REPORT:  Technique: CT of the abdomen and pelvis was performed with axial images as well as sagittal and coronal reconstruction images without intravenous contrast.    Comparison: None available.    Clinical History: Flank Pain (PT REPORTS OF RIGHT SIDE FLANK PAIN THAT ALSO RADIATES TO LOWER BACK, DENIES ANY TROUBLE URINATING OR HEMATURIA. ).    Dosage Information: Automated Exposure Control was utilized 703.73 mGy.cm.    Findings:  Thorax:  Lungs: There is nonspecific dependent change at the lung bases.  Pleura: No effusions or thickening.  Heart: The heart size is within normal limits.  Abdomen:  Abdominal Wall: No abdominal wall pathology is seen.  Liver: The liver appears unremarkable.  Biliary System: No intrahepatic or extrahepatic biliary duct dilatation is seen.  Gallbladder: The gallbladder appears unremarkable.  Pancreas: The pancreas appears unremarkable.  Spleen: The spleen appears unremarkable.  Adrenals: The adrenal glands appear unremarkable.  Kidneys: The kidneys appear unremarkable with no  stones cysts masses or hydronephrosis on this noncontrast study.  IVC: Unremarkable.  Bowel:  Esophagus: The visualized esophagus appears unremarkable.  Stomach: The stomach appears unremarkable.  Duodenum: Unremarkable appearing duodenum.  Small Bowel: The small bowel appears unremarkable.  Colon: There is moderate stool in the cecum ascending and transverse colon which could reflect an element of constipation.  Appendix: The appendix is not identified but no inflammatory changes are seen in the pericecal regionto suggest appendicitis.  Peritoneum: No intraperitoneal free air or ascites is seen.    Pelvis:  Bladder: The bladder appears unremarkable.  Female:  Uterus: The uterus is surgically absent.  Ovaries: No adnexal masses are seen.    Bony structures:  Dorsal Spine: There is mild spondylosis of the visualized dorsal spine.      Impression:  1. There is moderate stool in the cecum ascending and transverse colon which could reflect an element of constipation.  2. No acute intraabdominal or pelvic pathology is identified. Details as above.                          Preliminary result by Interface, Rad Results In (12/03/22 00:19:06)                   Narrative:    START OF REPORT:  Technique: CT of the abdomen and pelvis was performed with axial images as well as sagittal and coronal reconstruction images without intravenous contrast.    Comparison: None available.    Clinical History: Flank Pain (PT REPORTS OF RIGHT SIDE FLANK PAIN THAT ALSO RADIATES TO LOWER BACK, DENIES ANY TROUBLE URINATING OR HEMATURIA. ).    Dosage Information: Automated Exposure Control was utilized 703.73 mGy.cm.    Findings:  Thorax:  Lungs: There is nonspecific dependent change at the lung bases.  Pleura: No effusions or thickening.  Heart: The heart size is within normal limits.  Abdomen:  Abdominal Wall: No abdominal wall pathology is seen.  Liver: The liver appears unremarkable.  Biliary System: No intrahepatic or extrahepatic biliary  duct dilatation is seen.  Gallbladder: The gallbladder appears unremarkable.  Pancreas: The pancreas appears unremarkable.  Spleen: The spleen appears unremarkable.  Adrenals: The adrenal glands appear unremarkable.  Kidneys: The kidneys appear unremarkable with no stones cysts masses or hydronephrosis on this noncontrast study.  IVC: Unremarkable.  Bowel:  Esophagus: The visualized esophagus appears unremarkable.  Stomach: The stomach appears unremarkable.  Duodenum: Unremarkable appearing duodenum.  Small Bowel: The small bowel appears unremarkable.  Colon: There is moderate stool in the cecum ascending and transverse colon which could reflect an element of constipation.  Appendix: The appendix is not identified but no inflammatory changes are seen in the pericecal regionto suggest appendicitis.  Peritoneum: No intraperitoneal free air or ascites is seen.    Pelvis:  Bladder: The bladder appears unremarkable.  Female:  Uterus: The uterus is surgically absent.  Ovaries: No adnexal masses are seen.    Bony structures:  Dorsal Spine: There is mild spondylosis of the visualized dorsal spine.      Impression:  1. There is moderate stool in the cecum ascending and transverse colon which could reflect an element of constipation.  2. No acute intraabdominal or pelvic pathology is identified. Details as above.                                         Medications   cefTRIAXone (ROCEPHIN) 1 g in dextrose 5 % in water (D5W) 5 % 50 mL IVPB (MB+) (1 g Intravenous New Bag 12/2/22 2334)   sodium chloride 0.9% bolus 500 mL (500 mLs Intravenous New Bag 12/2/22 2236)   ketorolac injection 15 mg (15 mg Intravenous Given 12/2/22 2333)                              Clinical Impression:   Final diagnoses:  [K59.00] Constipation, unspecified constipation type (Primary)  [N39.0] Urinary tract infection without hematuria, site unspecified      ED Disposition Condition    Discharge Stable          ED Prescriptions       Medication Sig  Dispense Start Date End Date Auth. Provider    nitrofurantoin, macrocrystal-monohydrate, (MACROBID) 100 MG capsule Take 1 capsule (100 mg total) by mouth 2 (two) times daily. for 7 days 10 capsule 12/2/2022 12/9/2022 Daniel Dunlap MD    polyethylene glycol (GLYCOLAX) 17 gram PwPk Take 17 g by mouth once daily. for 3 days 3 each 12/2/2022 12/5/2022 Daniel Dunlap MD          Follow-up Information    None          Daniel Dunlap MD  12/02/22 0071

## 2022-12-12 ENCOUNTER — OFFICE VISIT (OUTPATIENT)
Dept: PRIMARY CARE CLINIC | Facility: CLINIC | Age: 77
End: 2022-12-12
Payer: MEDICARE

## 2022-12-12 VITALS
DIASTOLIC BLOOD PRESSURE: 56 MMHG | HEART RATE: 68 BPM | RESPIRATION RATE: 16 BRPM | HEIGHT: 61 IN | BODY MASS INDEX: 31.72 KG/M2 | SYSTOLIC BLOOD PRESSURE: 125 MMHG | WEIGHT: 168 LBS | TEMPERATURE: 99 F | OXYGEN SATURATION: 97 %

## 2022-12-12 DIAGNOSIS — N39.0 RECURRENT UTI: ICD-10-CM

## 2022-12-12 DIAGNOSIS — R41.89 COGNITIVE CHANGE: ICD-10-CM

## 2022-12-12 DIAGNOSIS — F03.90 DEMENTIA WITHOUT BEHAVIORAL DISTURBANCE: ICD-10-CM

## 2022-12-12 DIAGNOSIS — E11.65 UNCONTROLLED TYPE 2 DIABETES MELLITUS WITH HYPERGLYCEMIA: ICD-10-CM

## 2022-12-12 DIAGNOSIS — E11.65 UNCONTROLLED TYPE 2 DIABETES MELLITUS WITH HYPERGLYCEMIA: Primary | ICD-10-CM

## 2022-12-12 DIAGNOSIS — Z28.21 REFUSED INFLUENZA VACCINE: ICD-10-CM

## 2022-12-12 DIAGNOSIS — E78.00 HYPERCHOLESTEROLEMIA: ICD-10-CM

## 2022-12-12 DIAGNOSIS — Z78.0 POST-MENOPAUSAL: ICD-10-CM

## 2022-12-12 DIAGNOSIS — G44.86 CERVICOGENIC HEADACHE: ICD-10-CM

## 2022-12-12 DIAGNOSIS — I10 ESSENTIAL HYPERTENSION: Primary | ICD-10-CM

## 2022-12-12 LAB
APPEARANCE UR: CLEAR
BACTERIA #/AREA URNS AUTO: NORMAL /HPF
BILIRUB UR QL STRIP.AUTO: NEGATIVE MG/DL
COLOR UR AUTO: YELLOW
EST. AVERAGE GLUCOSE BLD GHB EST-MCNC: 157.1 MG/DL
FOLATE SERPL-MCNC: 11.8 NG/ML (ref 7–31.4)
GLUCOSE UR QL STRIP.AUTO: 500 MG/DL
HBA1C MFR BLD: 7.1 %
KETONES UR QL STRIP.AUTO: NEGATIVE MG/DL
LEUKOCYTE ESTERASE UR QL STRIP.AUTO: NEGATIVE UNIT/L
NITRITE UR QL STRIP.AUTO: NEGATIVE
PH UR STRIP.AUTO: 5 [PH]
PROT UR QL STRIP.AUTO: NEGATIVE MG/DL
RBC #/AREA URNS AUTO: NORMAL /HPF
RBC UR QL AUTO: NEGATIVE UNIT/L
SP GR UR STRIP.AUTO: 1.02
SQUAMOUS #/AREA URNS AUTO: NORMAL /HPF
UROBILINOGEN UR STRIP-ACNC: 0.2 MG/DL
WBC #/AREA URNS AUTO: NORMAL /HPF

## 2022-12-12 PROCEDURE — 81001 URINALYSIS AUTO W/SCOPE: CPT | Performed by: INTERNAL MEDICINE

## 2022-12-12 PROCEDURE — 1160F PR REVIEW ALL MEDS BY PRESCRIBER/CLIN PHARMACIST DOCUMENTED: ICD-10-PCS | Mod: CPTII,,, | Performed by: INTERNAL MEDICINE

## 2022-12-12 PROCEDURE — 1159F PR MEDICATION LIST DOCUMENTED IN MEDICAL RECORD: ICD-10-PCS | Mod: CPTII,,, | Performed by: INTERNAL MEDICINE

## 2022-12-12 PROCEDURE — 1125F AMNT PAIN NOTED PAIN PRSNT: CPT | Mod: CPTII,,, | Performed by: INTERNAL MEDICINE

## 2022-12-12 PROCEDURE — 82746 ASSAY OF FOLIC ACID SERUM: CPT | Performed by: INTERNAL MEDICINE

## 2022-12-12 PROCEDURE — 3074F PR MOST RECENT SYSTOLIC BLOOD PRESSURE < 130 MM HG: ICD-10-PCS | Mod: CPTII,,, | Performed by: INTERNAL MEDICINE

## 2022-12-12 PROCEDURE — 1160F RVW MEDS BY RX/DR IN RCRD: CPT | Mod: CPTII,,, | Performed by: INTERNAL MEDICINE

## 2022-12-12 PROCEDURE — 3078F PR MOST RECENT DIASTOLIC BLOOD PRESSURE < 80 MM HG: ICD-10-PCS | Mod: CPTII,,, | Performed by: INTERNAL MEDICINE

## 2022-12-12 PROCEDURE — 1125F PR PAIN SEVERITY QUANTIFIED, PAIN PRESENT: ICD-10-PCS | Mod: CPTII,,, | Performed by: INTERNAL MEDICINE

## 2022-12-12 PROCEDURE — 99213 OFFICE O/P EST LOW 20 MIN: CPT | Mod: ,,, | Performed by: INTERNAL MEDICINE

## 2022-12-12 PROCEDURE — 81003 URINALYSIS AUTO W/O SCOPE: CPT | Performed by: INTERNAL MEDICINE

## 2022-12-12 PROCEDURE — 83036 HEMOGLOBIN GLYCOSYLATED A1C: CPT | Performed by: INTERNAL MEDICINE

## 2022-12-12 PROCEDURE — 3288F FALL RISK ASSESSMENT DOCD: CPT | Mod: CPTII,,, | Performed by: INTERNAL MEDICINE

## 2022-12-12 PROCEDURE — 3288F PR FALLS RISK ASSESSMENT DOCUMENTED: ICD-10-PCS | Mod: CPTII,,, | Performed by: INTERNAL MEDICINE

## 2022-12-12 PROCEDURE — 3078F DIAST BP <80 MM HG: CPT | Mod: CPTII,,, | Performed by: INTERNAL MEDICINE

## 2022-12-12 PROCEDURE — 99213 PR OFFICE/OUTPT VISIT, EST, LEVL III, 20-29 MIN: ICD-10-PCS | Mod: ,,, | Performed by: INTERNAL MEDICINE

## 2022-12-12 PROCEDURE — 1159F MED LIST DOCD IN RCRD: CPT | Mod: CPTII,,, | Performed by: INTERNAL MEDICINE

## 2022-12-12 PROCEDURE — 1101F PT FALLS ASSESS-DOCD LE1/YR: CPT | Mod: CPTII,,, | Performed by: INTERNAL MEDICINE

## 2022-12-12 PROCEDURE — 3074F SYST BP LT 130 MM HG: CPT | Mod: CPTII,,, | Performed by: INTERNAL MEDICINE

## 2022-12-12 PROCEDURE — 36415 COLL VENOUS BLD VENIPUNCTURE: CPT | Performed by: INTERNAL MEDICINE

## 2022-12-12 PROCEDURE — 1101F PR PT FALLS ASSESS DOC 0-1 FALLS W/OUT INJ PAST YR: ICD-10-PCS | Mod: CPTII,,, | Performed by: INTERNAL MEDICINE

## 2022-12-12 RX ORDER — NITROFURANTOIN 25; 75 MG/1; MG/1
100 CAPSULE ORAL 2 TIMES DAILY
Status: ON HOLD | COMMUNITY
End: 2023-11-27 | Stop reason: HOSPADM

## 2022-12-12 RX ORDER — CALCIUM CITRATE/VITAMIN D3 200MG-6.25
TABLET ORAL
COMMUNITY
Start: 2022-10-06 | End: 2022-12-14 | Stop reason: SDUPTHER

## 2022-12-12 NOTE — PROGRESS NOTES
Naz Lugo MD   [id number] [place] [address]     Patient ID: [id number]     Chief Complaint: 3 months follow up (C/O lower back pain.)        HPI:     Demetrice Barrett is a 77 y.o. female here today for a follow up. No other complaints today.       Subjective:     Review of Systems   Constitutional: Negative.    Eyes:         Says due 16th with [facility]   Cardiovascular: Negative.    Musculoskeletal:  Positive for back pain.   Skin: Negative.    Neurological:  Positive for headaches.        Pressure in the back of the head, memory is worse. Doesn't recall seeing neurologist   Psychiatric/Behavioral:          Neurology was trying to send to psych, I don't see any reports.      ----------------------------  COVID-19  Diabetes mellitus type II, uncontrolled  HTN (hypertension)  Hypercholesterolemia  Internal hemorrhoids  Obesity  Tubular adenoma of colon     Past Surgical History:   Procedure Laterality Date    COLONOSCOPY N/A 12/04/2020    COLONOSCOPY W/ BIOPSIES AND POLYPECTOMY      TOTAL ABDOMINAL HYSTERECTOMY         Family History   Problem Relation Age of Onset    Hyperlipidemia Mother     Hypertension Mother     Kidney disease Mother     Gout Mother     Stroke Sister     Hypertension Sister     Diabetes Sister     Heart disease Sister     Anemia Sister     Kidney disease Sister         Social History     Socioeconomic History    Marital status:    Tobacco Use    Smoking status: Never    Smokeless tobacco: Never   Substance and Sexual Activity    Alcohol use: Never    Drug use: Never       Review of patient's allergies indicates:  No Known Allergies    Outpatient Medications Marked as Taking for the 12/12/22 encounter (Office Visit) with Naz Lugo MD   Medication Sig Dispense Refill    amLODIPine (NORVASC) 10 MG tablet Take 10 mg by mouth once daily at 6am.      aspirin 81 MG Chew Take 1 tablet (81 mg total) by mouth once daily.  0    donepeziL (ARICEPT) 5 MG tablet Take 1 tablet (5 mg total)  "by mouth 2 (two) times a day. 180 tablet 2    enalapril (VASOTEC) 20 MG tablet TAKE 1 TABLET EVERY DAY 90 tablet 1    glipiZIDE (GLUCOTROL) 5 MG tablet TAKE 1 TABLET TWICE DAILY 180 tablet 1    metFORMIN (GLUCOPHAGE) 850 MG tablet TAKE 1 TABLET TWICE DAILY 180 tablet 1    nitrofurantoin, macrocrystal-monohydrate, (MACROBID) 100 MG capsule Take 100 mg by mouth 2 (two) times daily.      pioglitazone (ACTOS) 30 MG tablet TAKE 1 TABLET EVERY DAY 90 tablet 1       Patient Care Team:  Naz Lugo MD as PCP - General (Internal Medicine)       Objective:     BP (!) 125/56   Pulse 68   Temp 98.7 °F (37.1 °C)   Resp 16   Ht 5' 1" (1.549 m)   Wt 76.2 kg (168 lb)   SpO2 97%   BMI 31.74 kg/m²     Physical Exam  Vitals reviewed.   Constitutional:       General: She is not in acute distress.     Appearance: She is obese.   HENT:      Head: Normocephalic.      Right Ear: Tympanic membrane, ear canal and external ear normal.      Left Ear: Tympanic membrane, ear canal and external ear normal.   Eyes:      Extraocular Movements: Extraocular movements intact.      Pupils: Pupils are equal, round, and reactive to light.   Cardiovascular:      Rate and Rhythm: Normal rate and regular rhythm.   Pulmonary:      Effort: Pulmonary effort is normal.      Breath sounds: Normal breath sounds.   Abdominal:      General: Abdomen is flat.      Palpations: Abdomen is soft.      Tenderness: There is abdominal tenderness.      Comments: Mild tenderness LUQ, no mass   Musculoskeletal:      Comments: ROM on neck is fair. The area of pressure is over occipital region on both sides.    Skin:     General: Skin is warm and dry.   Neurological:      General: No focal deficit present.      Mental Status: She is alert and oriented to person, place, and time. Mental status is at baseline.      Comments: Aware memory is not working well,    Psychiatric:         Mood and Affect: Mood normal.         Behavior: Behavior normal.         Judgment: " Judgment normal.         Assessment/Problems:       ICD-10-CM ICD-9-CM   1. Essential hypertension  I10 401.9   2. Uncontrolled type 2 diabetes mellitus with hyperglycemia  E11.65 250.02   3. Hypercholesterolemia  E78.00 272.0   4. Dementia without behavioral disturbance  F03.90 294.20   5. Cognitive change  R41.89 799.59   6. Refused influenza vaccine  Z28.21 V64.06   7. Post-menopausal  Z78.0 V49.81   8. Recurrent UTI  N39.0 599.0   9. Cervicogenic headache  G44.86 784.0        Plan:     1. Essential hypertension    2. Uncontrolled type 2 diabetes mellitus with hyperglycemia  Comments:  Check A1C today. She says she has eye visit on 16th, will ask them to send us report.Memory is poor, asked daughter to check  Orders:  -     Cancel: Hemoglobin A1C; Future; Expected date: 12/12/2022  -     Hemoglobin A1C; Future; Expected date: 12/12/2022    3. Hypercholesterolemia    4. Dementia without behavioral disturbance  Comments:  Had scans, Thyroid and B12 levels good as is RPR  Overview:  CBC CMP and TSH in 10/2021 unremarkable apart from mildly elevated LFTs, hepatic function panel in 2/2022 normal, A1C in 01/2022 8    MRI brain 10/2021 unremarkable, mild chronic microangiopathy noted    MOCA scores:  08/2022: 19/30- difficulty with language and recall    Orders:  -     Cancel: Folate; Future; Expected date: 12/12/2022  -     Folate; Future; Expected date: 12/12/2022    5. Cognitive change  Comments:  I can't find her folate level will recheck. Dr Marcelino has her return for a year.   Orders:  -     Cancel: Folate; Future; Expected date: 12/12/2022  -     Folate; Future; Expected date: 12/12/2022    6. Refused influenza vaccine    7. Post-menopausal  -     DXA Bone Density Spine And Hip; Future; Expected date: 12/12/2022    8. Recurrent UTI  -     Urinalysis, Reflex to Urine Culture Urine, Clean Catch; Future; Expected date: 12/12/2022    9. Cervicogenic headache  Comments:  Will try changing pillow first.If persists will  get neck xray             Follow up in about 3 months (around 3/12/2023) for Uncontrolled Diabetes. In addition to their scheduled follow up, the patient has also been instructed to follow up on as needed basis.     Signature:  Naz Lugo MD  [facility]  [id number] [place] [id number]

## 2022-12-13 ENCOUNTER — TELEPHONE (OUTPATIENT)
Dept: PRIMARY CARE CLINIC | Facility: CLINIC | Age: 77
End: 2022-12-13
Payer: MEDICARE

## 2022-12-13 NOTE — TELEPHONE ENCOUNTER
----- Message from Love Hein sent at 12/13/2022  2:04 PM CST -----  Regarding: call back  .Type:  Needs Medical Advice    Who Called: gila  Pharmacy name and phone #:  [email or web address] (mail)  Would the patient rather a call back or a response via [facility]?   Best Call Back Number: [phone]  Additional Information: Pt called and said she needs some  more strips to check her blood. Pls call back pt.

## 2022-12-14 RX ORDER — CALCIUM CITRATE/VITAMIN D3 200MG-6.25
1 TABLET ORAL DAILY
Qty: 150 STRIP | Refills: 3 | Status: SHIPPED | OUTPATIENT
Start: 2022-12-14 | End: 2023-09-25 | Stop reason: SDUPTHER

## 2022-12-15 NOTE — TELEPHONE ENCOUNTER
----- Message from Naz Lugo MD sent at 12/13/2022  7:26 PM CST -----  The diabetes is getting better, keep it up. The folate was good and her urine is clear, it just shows sugar.

## 2022-12-27 ENCOUNTER — HOSPITAL ENCOUNTER (OUTPATIENT)
Dept: RADIOLOGY | Facility: HOSPITAL | Age: 77
Discharge: HOME | End: 2022-12-27
Attending: INTERNAL MEDICINE
Payer: MEDICARE

## 2022-12-27 DIAGNOSIS — Z78.0 POST-MENOPAUSAL: ICD-10-CM

## 2022-12-27 PROCEDURE — 77080 DXA BONE DENSITY AXIAL: CPT | Mod: TC

## 2023-01-03 ENCOUNTER — TELEPHONE (OUTPATIENT)
Dept: PRIMARY CARE CLINIC | Facility: CLINIC | Age: 78
End: 2023-01-03
Payer: MEDICARE

## 2023-01-03 NOTE — TELEPHONE ENCOUNTER
----- Message from Naz Lugo MD sent at 12/29/2022  2:16 PM CST -----  Bones show some loss of calcium it is osteopenia so there is a little risk of breaking but it's doing better on the alendronate.  Recheck in 2 years.

## 2023-02-08 ENCOUNTER — TELEPHONE (OUTPATIENT)
Dept: PRIMARY CARE CLINIC | Facility: CLINIC | Age: 78
End: 2023-02-08
Payer: MEDICARE

## 2023-02-08 NOTE — TELEPHONE ENCOUNTER
----- Message from Lottie Suero sent at 2/8/2023  1:41 PM CST -----  Regarding: refill  Type:  RX Refill Request    Who Called: Demetrice  Refill or New Rx:refill  RX Name and Strength:TRUE METRIX GLUCOSE TEST STRIP   How is the patient currently taking it? (ex. 1XDay):2xday  Is this a 30 day or 90 day RX:90 day  Preferred Pharmacy with phone number:[facility]  Local or Mail Order:Mail Order  Ordering Provider:Parish  Would the patient rather a call back or a response via [facility]?   Best Call Back Number:[phone]  Additional Information: note: Dr. Lugo increased her testing to 2xday (am and pm times). Please call patient back to confirm.

## 2023-02-16 ENCOUNTER — APPOINTMENT (OUTPATIENT)
Dept: LAB | Facility: HOSPITAL | Age: 78
End: 2023-02-16
Attending: INTERNAL MEDICINE
Payer: MEDICARE

## 2023-02-18 LAB — BACTERIA UR CULT: NORMAL

## 2023-02-20 ENCOUNTER — TELEPHONE (OUTPATIENT)
Dept: PRIMARY CARE CLINIC | Facility: CLINIC | Age: 78
End: 2023-02-20
Payer: MEDICARE

## 2023-03-07 ENCOUNTER — TELEPHONE (OUTPATIENT)
Dept: NEUROLOGY | Facility: CLINIC | Age: 78
End: 2023-03-07
Payer: MEDICARE

## 2023-03-07 DIAGNOSIS — F03.90 DEMENTIA WITHOUT BEHAVIORAL DISTURBANCE: Primary | ICD-10-CM

## 2023-03-07 NOTE — TELEPHONE ENCOUNTER
Referall was sent to April Brianda NP for psychiatry unable to leave VM regarding referral was out of office

## 2023-03-07 NOTE — TELEPHONE ENCOUNTER
States they received a referral for psychiatry 8/16/22.  Is asking what provider referring provider would like pt to see.   States seeking a callback to discuss referral.

## 2023-03-08 ENCOUNTER — TELEPHONE (OUTPATIENT)
Dept: PRIMARY CARE CLINIC | Facility: CLINIC | Age: 78
End: 2023-03-08
Payer: MEDICARE

## 2023-03-08 NOTE — TELEPHONE ENCOUNTER
Called pt to inform pt that I called the numbers that she listed for counseling, [facility] has a wait of 1-2 weeks after registration, did not receive a answer from [facility], and [facility] has the soonest appts but they do not accept medicaid the cost of  sessions are $95 which is a discounted price. No answer from pt LVM with call back number.

## 2023-03-08 NOTE — TELEPHONE ENCOUNTER
----- Message from Love Hein sent at 3/7/2023 10:18 AM CST -----  Regarding: call back  .Type:  Needs Medical Advice    Who Called: gila  Would the patient rather a call back or a response via [facility]? cb  Best Call Back Number: [phone]  Additional Information: pt said she was told call back on who takes the medicaid [facility] [phone] [facility] [phone]  [facility]  [phone] she wants who ever have the earliest availability she wants pls call back pt .

## 2023-03-09 NOTE — TELEPHONE ENCOUNTER
Spoke with Rosio lopez psych referral was sent to [facility] and [facility] due to no response from previous referrals office denies response from either psych facility at this time

## 2023-03-10 NOTE — TELEPHONE ENCOUNTER
BMI above normal limits, recommended weight loss, improve diet and follow up with internist. She needs neuropsych testing to evaluate for pseudodementia versus neurocognitive dementia.  I re-entered the referral to go to  and stepfather for the neuropsych testing.  Please make sure they get the referral

## 2023-03-10 NOTE — TELEPHONE ENCOUNTER
it was to inform that referral for psych was resent to [facility] and one to [facility] due to april gilberto's office not responding to referal sent to them

## 2023-03-13 ENCOUNTER — OFFICE VISIT (OUTPATIENT)
Dept: PRIMARY CARE CLINIC | Facility: CLINIC | Age: 78
End: 2023-03-13
Payer: MEDICARE

## 2023-03-13 VITALS
SYSTOLIC BLOOD PRESSURE: 152 MMHG | DIASTOLIC BLOOD PRESSURE: 70 MMHG | OXYGEN SATURATION: 99 % | HEART RATE: 68 BPM | BODY MASS INDEX: 30.36 KG/M2 | HEIGHT: 62 IN | TEMPERATURE: 98 F | WEIGHT: 165 LBS | RESPIRATION RATE: 15 BRPM

## 2023-03-13 DIAGNOSIS — N39.0 RECURRENT UTI: ICD-10-CM

## 2023-03-13 DIAGNOSIS — E78.00 HYPERCHOLESTEROLEMIA: ICD-10-CM

## 2023-03-13 DIAGNOSIS — I70.90 ATHEROSCLEROSIS: ICD-10-CM

## 2023-03-13 DIAGNOSIS — Z11.59 ENCOUNTER FOR HEPATITIS C SCREENING TEST FOR LOW RISK PATIENT: ICD-10-CM

## 2023-03-13 DIAGNOSIS — E11.65 UNCONTROLLED TYPE 2 DIABETES MELLITUS WITH HYPERGLYCEMIA: Primary | ICD-10-CM

## 2023-03-13 DIAGNOSIS — F03.90 DEMENTIA WITHOUT BEHAVIORAL DISTURBANCE: ICD-10-CM

## 2023-03-13 DIAGNOSIS — I10 ESSENTIAL HYPERTENSION: ICD-10-CM

## 2023-03-13 DIAGNOSIS — G89.29 CHRONIC BILATERAL LOW BACK PAIN WITHOUT SCIATICA: ICD-10-CM

## 2023-03-13 DIAGNOSIS — M54.50 CHRONIC BILATERAL LOW BACK PAIN WITHOUT SCIATICA: ICD-10-CM

## 2023-03-13 DIAGNOSIS — R10.9 RIGHT FLANK PAIN: ICD-10-CM

## 2023-03-13 PROCEDURE — 1159F PR MEDICATION LIST DOCUMENTED IN MEDICAL RECORD: ICD-10-PCS | Mod: CPTII,,, | Performed by: INTERNAL MEDICINE

## 2023-03-13 PROCEDURE — 36415 PR COLLECTION VENOUS BLOOD,VENIPUNCTURE: ICD-10-PCS | Mod: ,,, | Performed by: INTERNAL MEDICINE

## 2023-03-13 PROCEDURE — 99214 OFFICE O/P EST MOD 30 MIN: CPT | Mod: ,,, | Performed by: INTERNAL MEDICINE

## 2023-03-13 PROCEDURE — 3078F DIAST BP <80 MM HG: CPT | Mod: CPTII,,, | Performed by: INTERNAL MEDICINE

## 2023-03-13 PROCEDURE — 36415 COLL VENOUS BLD VENIPUNCTURE: CPT | Mod: ,,, | Performed by: INTERNAL MEDICINE

## 2023-03-13 PROCEDURE — 3078F PR MOST RECENT DIASTOLIC BLOOD PRESSURE < 80 MM HG: ICD-10-PCS | Mod: CPTII,,, | Performed by: INTERNAL MEDICINE

## 2023-03-13 PROCEDURE — 3288F PR FALLS RISK ASSESSMENT DOCUMENTED: ICD-10-PCS | Mod: CPTII,,, | Performed by: INTERNAL MEDICINE

## 2023-03-13 PROCEDURE — 1125F PR PAIN SEVERITY QUANTIFIED, PAIN PRESENT: ICD-10-PCS | Mod: CPTII,,, | Performed by: INTERNAL MEDICINE

## 2023-03-13 PROCEDURE — 1125F AMNT PAIN NOTED PAIN PRSNT: CPT | Mod: CPTII,,, | Performed by: INTERNAL MEDICINE

## 2023-03-13 PROCEDURE — 3077F SYST BP >= 140 MM HG: CPT | Mod: CPTII,,, | Performed by: INTERNAL MEDICINE

## 2023-03-13 PROCEDURE — 1101F PR PT FALLS ASSESS DOC 0-1 FALLS W/OUT INJ PAST YR: ICD-10-PCS | Mod: CPTII,,, | Performed by: INTERNAL MEDICINE

## 2023-03-13 PROCEDURE — 3288F FALL RISK ASSESSMENT DOCD: CPT | Mod: CPTII,,, | Performed by: INTERNAL MEDICINE

## 2023-03-13 PROCEDURE — 1160F RVW MEDS BY RX/DR IN RCRD: CPT | Mod: CPTII,,, | Performed by: INTERNAL MEDICINE

## 2023-03-13 PROCEDURE — 3077F PR MOST RECENT SYSTOLIC BLOOD PRESSURE >= 140 MM HG: ICD-10-PCS | Mod: CPTII,,, | Performed by: INTERNAL MEDICINE

## 2023-03-13 PROCEDURE — 1159F MED LIST DOCD IN RCRD: CPT | Mod: CPTII,,, | Performed by: INTERNAL MEDICINE

## 2023-03-13 PROCEDURE — 1160F PR REVIEW ALL MEDS BY PRESCRIBER/CLIN PHARMACIST DOCUMENTED: ICD-10-PCS | Mod: CPTII,,, | Performed by: INTERNAL MEDICINE

## 2023-03-13 PROCEDURE — 99214 PR OFFICE/OUTPT VISIT, EST, LEVL IV, 30-39 MIN: ICD-10-PCS | Mod: ,,, | Performed by: INTERNAL MEDICINE

## 2023-03-13 PROCEDURE — 1101F PT FALLS ASSESS-DOCD LE1/YR: CPT | Mod: CPTII,,, | Performed by: INTERNAL MEDICINE

## 2023-03-13 NOTE — PROGRESS NOTES
Successful draw to patient's RAC  using 21 G.  Patient tolerated well.   Collected-  3 L, 1 G and urine collected

## 2023-03-13 NOTE — PROGRESS NOTES
Naz Lugo MD   [address] [place] [address]     Patient ID: [id number]     Chief Complaint: 6 month follow (Patient states she has pain in her lower back.)        HPI:     Demetrice Barrett is a 77 y.o. female here today for a follow up. She is alone today. She had gone to the ER she thought in February but there is nothing in the system.       Subjective:     Review of Systems   Constitutional:  Negative for chills and fever.   Respiratory: Negative.     Cardiovascular: Negative.    Genitourinary:  Positive for flank pain. Negative for dysuria.        Goes often only if she drinks a lot of water.    Skin:  Negative for rash.   Neurological:         Not driving,  brought today but didn't come in the room.    Endo/Heme/Allergies:         Sugars are up and down, she admits it's often what she eats but sometimes she doesn't know what caused it.    Psychiatric/Behavioral:          Still hasn't heard from the counselor, she thinks they are to call her daughter.      ----------------------------  COVID-19  Diabetes mellitus type II, uncontrolled  HTN (hypertension)  Hypercholesterolemia  Internal hemorrhoids  Obesity  Tubular adenoma of colon     Past Surgical History:   Procedure Laterality Date    COLONOSCOPY N/A 12/04/2020    COLONOSCOPY W/ BIOPSIES AND POLYPECTOMY      TOTAL ABDOMINAL HYSTERECTOMY         Family History   Problem Relation Age of Onset    Hyperlipidemia Mother     Hypertension Mother     Kidney disease Mother     Gout Mother     Stroke Sister     Hypertension Sister     Diabetes Sister     Heart disease Sister     Anemia Sister     Kidney disease Sister         Social History     Socioeconomic History    Marital status:    Tobacco Use    Smoking status: Never    Smokeless tobacco: Never   Substance and Sexual Activity    Alcohol use: Never    Drug use: Never    Sexual activity: Not Currently       Review of patient's allergies indicates:  No Known Allergies    Outpatient Medications  "Marked as Taking for the 3/13/23 encounter (Office Visit) with Naz Lugo MD   Medication Sig Dispense Refill    amLODIPine (NORVASC) 10 MG tablet Take 10 mg by mouth once daily at 6am.      donepeziL (ARICEPT) 5 MG tablet Take 1 tablet (5 mg total) by mouth 2 (two) times a day. 180 tablet 2    enalapril (VASOTEC) 20 MG tablet TAKE 1 TABLET EVERY DAY 90 tablet 1    glipiZIDE (GLUCOTROL) 5 MG tablet TAKE 1 TABLET TWICE DAILY 180 tablet 1    metFORMIN (GLUCOPHAGE) 850 MG tablet TAKE 1 TABLET TWICE DAILY 180 tablet 1    pioglitazone (ACTOS) 30 MG tablet TAKE 1 TABLET EVERY DAY 90 tablet 1    rosuvastatin (CRESTOR) 20 MG tablet Take 20 mg by mouth once daily at 6am.      TRUE METRIX GLUCOSE TEST STRIP Strp Inject 1 strip into the skin once daily. For diabetes E11.65 150 strip 3    TRUEPLUS LANCETS 33 gauge Misc TEST BLOOD SUGAR TWICE DAILY 200 each 3       Patient Care Team:  Naz Lugo MD as PCP - General (Internal Medicine)  Paty Marcelino MD as Consulting Physician (Neurology)  Love Singh MD as Consulting Physician (Cardiovascular Disease)  [facility] #[id number]       Objective:     BP (!) 152/70 (BP Location: Left arm, Patient Position: Sitting)   Pulse 68   Temp 97.9 °F (36.6 °C)   Resp 15   Ht 5' 2" (1.575 m)   Wt 74.8 kg (165 lb)   SpO2 99%   BMI 30.18 kg/m²     Physical Exam  Vitals reviewed.   Neurological:      Mental Status: She is alert.         Assessment/Problems:       ICD-10-CM ICD-9-CM   1. Uncontrolled type 2 diabetes mellitus with hyperglycemia  E11.65 250.02   2. Essential hypertension  I10 401.9   3. Dementia without behavioral disturbance  F03.90 294.20   4. Hypercholesterolemia  E78.00 272.0   5. Right flank pain  R10.9 789.09   6. Recurrent UTI  N39.0 599.0   7. Chronic bilateral low back pain without sciatica  M54.50 724.2    G89.29 338.29   8. Encounter for hepatitis C screening test for low risk patient  Z11.59 V73.89   9. Atherosclerosis  I70.90 440.9        Plan: "     1. Uncontrolled type 2 diabetes mellitus with hyperglycemia  Comments:  Check A1C today, continue Glipizide, Actgos and Glucophage.   Orders:  -     Hemoglobin A1C  -     Basic Metabolic Panel    2. Essential hypertension  Comments:  On amlodipine and enalapril, elevated today, will have family check at home.     3. Dementia without behavioral disturbance  Comments:  Memory is still and issue, she isn't sure where she went to the hospital for UTI/confusion. Follow up with neuro is August  Overview:  CBC CMP and TSH in 10/2021 unremarkable apart from mildly elevated LFTs, hepatic function panel in 2/2022 normal, A1C in 01/2022 8    MRI brain 10/2021 unremarkable, mild chronic microangiopathy noted    MOCA scores:  08/2022: 19/30- difficulty with language and recall      4. Hypercholesterolemia  Comments:  Continue Rosuvastatin, lipid with annual exam    5. Right flank pain  Comments:  Recheck UA but looks like SI joint issue so will get pelvis  Orders:  -     Urinalysis, Reflex to Urine Culture  -     X-Ray Lumbar Spine 2 Or 3 Views; Future; Expected date: 03/13/2023  -     X-Ray Pelvis Complete min 3 views; Future; Expected date: 03/13/2023    6. Recurrent UTI  Comments:  REcurrent UTI will recheck with flank pain  Orders:  -     CBC Auto Differential  -     Urinalysis, Reflex to Urine Culture    7. Chronic bilateral low back pain without sciatica  Comments:  Xray when she can get to hospital  Orders:  -     X-Ray Lumbar Spine 2 Or 3 Views; Future; Expected date: 03/13/2023  -     X-Ray Pelvis Complete min 3 views; Future; Expected date: 03/13/2023    8. Encounter for hepatitis C screening test for low risk patient  Comments:  Low risk  Orders:  -     Hepatitis C Antibody    9. Atherosclerosis  Comments:  She has no memory of PVD but does show some plaque in thoracic aorta. Continue Crestor.              Follow up in about 3 months (around 6/13/2023) for Uncontrolled Diabetes, uncontrolled HTN. In addition to  their scheduled follow up, the patient has also been instructed to follow up on as needed basis.     Signature:  Naz Lugo MD  [facility]  [address] [place] [address]

## 2023-03-15 LAB
APPEARANCE UR: CLEAR
BACTERIA #/AREA URNS HPF: NORMAL /[HPF]
BACTERIA UR CULT: NORMAL
BACTERIA UR CULT: NORMAL
BASOPHILS # BLD AUTO: 0 X10E3/UL (ref 0–0.2)
BASOPHILS NFR BLD AUTO: 0 %
BILIRUB UR QL STRIP: NEGATIVE
BUN SERPL-MCNC: 13 MG/DL (ref 8–27)
BUN/CREAT SERPL: 16 (ref 12–28)
CALCIUM SERPL-MCNC: 9.7 MG/DL (ref 8.7–10.3)
CHLORIDE SERPL-SCNC: 100 MMOL/L (ref 96–106)
CO2 SERPL-SCNC: 24 MMOL/L (ref 20–29)
COLOR UR: YELLOW
CREAT SERPL-MCNC: 0.79 MG/DL (ref 0.57–1)
CRYSTALS URNS MICRO: NORMAL
EOSINOPHIL # BLD AUTO: 0.1 X10E3/UL (ref 0–0.4)
EOSINOPHIL NFR BLD AUTO: 1 %
EPI CELLS #/AREA URNS HPF: NORMAL /HPF (ref 0–10)
ERYTHROCYTE [DISTWIDTH] IN BLOOD BY AUTOMATED COUNT: 13.2 % (ref 11.7–15.4)
EST. GFR  (NO RACE VARIABLE): 77 ML/MIN/1.73
GLUCOSE SERPL-MCNC: 221 MG/DL (ref 70–99)
GLUCOSE UR QL STRIP: ABNORMAL
HBA1C MFR BLD: 8 % (ref 4.8–5.6)
HCT VFR BLD AUTO: 39.8 % (ref 34–46.6)
HCV IGG SERPL QL IA: NON REACTIVE
HGB BLD-MCNC: 13 G/DL (ref 11.1–15.9)
HGB UR QL STRIP: NEGATIVE
IMM GRANULOCYTES NFR BLD AUTO: 0 %
KETONES UR QL STRIP: NEGATIVE
LEUKOCYTE ESTERASE UR QL STRIP: ABNORMAL
LYMPHOCYTES # BLD AUTO: 1.8 X10E3/UL (ref 0.7–3.1)
LYMPHOCYTES NFR BLD AUTO: 24 %
MCH RBC QN AUTO: 29.9 PG (ref 26.6–33)
MCHC RBC AUTO-ENTMCNC: 32.7 G/DL (ref 31.5–35.7)
MCV RBC AUTO: 92 FL (ref 79–97)
MICRO URNS: ABNORMAL
MONOCYTES # BLD AUTO: 0.4 X10E3/UL (ref 0.1–0.9)
MONOCYTES NFR BLD AUTO: 5 %
NEUTROPHILS # BLD AUTO: 5.1 X10E3/UL (ref 1.4–7)
NEUTROPHILS NFR BLD AUTO: 70 %
NITRITE UR QL STRIP: NEGATIVE
PH UR STRIP: 5.5 [PH] (ref 5–7.5)
PLATELET # BLD AUTO: 266 X10E3/UL (ref 150–450)
POTASSIUM SERPL-SCNC: 4.3 MMOL/L (ref 3.5–5.2)
PROT UR QL STRIP: NEGATIVE
RBC # BLD AUTO: 4.35 X10E6/UL (ref 3.77–5.28)
RBC #/AREA URNS HPF: NORMAL /HPF (ref 0–2)
SODIUM SERPL-SCNC: 139 MMOL/L (ref 134–144)
SP GR UR STRIP: 1.02 (ref 1–1.03)
URINALYSIS REFLEX: ABNORMAL
UROBILINOGEN UR STRIP-MCNC: 0.2 MG/DL (ref 0.2–1)
WBC # BLD AUTO: 7.3 X10E3/UL (ref 3.4–10.8)
WBC #/AREA URNS HPF: NORMAL /HPF (ref 0–5)

## 2023-03-20 RX ORDER — PIOGLITAZONEHYDROCHLORIDE 45 MG/1
45 TABLET ORAL DAILY
Qty: 90 TABLET | Refills: 3 | Status: ON HOLD | OUTPATIENT
Start: 2023-03-20 | End: 2023-11-27 | Stop reason: HOSPADM

## 2023-06-07 ENCOUNTER — TELEPHONE (OUTPATIENT)
Dept: PRIMARY CARE CLINIC | Facility: CLINIC | Age: 78
End: 2023-06-07
Payer: MEDICARE

## 2023-06-07 NOTE — TELEPHONE ENCOUNTER
No answer/Not able to leave a voicemail because the mailbox was full.    Patient was contacted to remind her of her upcoming appointment on 06/15/23 at 11:20 am.

## 2023-06-22 ENCOUNTER — OFFICE VISIT (OUTPATIENT)
Dept: PRIMARY CARE CLINIC | Facility: CLINIC | Age: 78
End: 2023-06-22
Payer: MEDICARE

## 2023-06-22 ENCOUNTER — TELEPHONE (OUTPATIENT)
Dept: PRIMARY CARE CLINIC | Facility: CLINIC | Age: 78
End: 2023-06-22

## 2023-06-22 VITALS
BODY MASS INDEX: 29.44 KG/M2 | TEMPERATURE: 98 F | DIASTOLIC BLOOD PRESSURE: 72 MMHG | HEART RATE: 79 BPM | OXYGEN SATURATION: 98 % | SYSTOLIC BLOOD PRESSURE: 131 MMHG | HEIGHT: 62 IN | WEIGHT: 160 LBS | RESPIRATION RATE: 16 BRPM

## 2023-06-22 DIAGNOSIS — I50.30 CONGESTIVE HEART FAILURE WITH LV DIASTOLIC DYSFUNCTION, NYHA CLASS 1: ICD-10-CM

## 2023-06-22 DIAGNOSIS — I35.1 AORTIC VALVE INSUFFICIENCY, ETIOLOGY OF CARDIAC VALVE DISEASE UNSPECIFIED: ICD-10-CM

## 2023-06-22 DIAGNOSIS — I10 ESSENTIAL HYPERTENSION: ICD-10-CM

## 2023-06-22 DIAGNOSIS — E11.65 UNCONTROLLED TYPE 2 DIABETES MELLITUS WITH HYPERGLYCEMIA: Primary | ICD-10-CM

## 2023-06-22 DIAGNOSIS — R09.89 DECREASED PULSES IN FEET: ICD-10-CM

## 2023-06-22 DIAGNOSIS — F03.90 DEMENTIA WITHOUT BEHAVIORAL DISTURBANCE: ICD-10-CM

## 2023-06-22 LAB — HBA1C MFR BLD: 11.5 %

## 2023-06-22 PROCEDURE — 3078F DIAST BP <80 MM HG: CPT | Mod: CPTII,,, | Performed by: INTERNAL MEDICINE

## 2023-06-22 PROCEDURE — 3075F PR MOST RECENT SYSTOLIC BLOOD PRESS GE 130-139MM HG: ICD-10-PCS | Mod: CPTII,,, | Performed by: INTERNAL MEDICINE

## 2023-06-22 PROCEDURE — 83036 POCT HEMOGLOBIN A1C: ICD-10-PCS | Mod: QW,,, | Performed by: INTERNAL MEDICINE

## 2023-06-22 PROCEDURE — 1125F AMNT PAIN NOTED PAIN PRSNT: CPT | Mod: CPTII,,, | Performed by: INTERNAL MEDICINE

## 2023-06-22 PROCEDURE — 1160F RVW MEDS BY RX/DR IN RCRD: CPT | Mod: CPTII,,, | Performed by: INTERNAL MEDICINE

## 2023-06-22 PROCEDURE — 1159F MED LIST DOCD IN RCRD: CPT | Mod: CPTII,,, | Performed by: INTERNAL MEDICINE

## 2023-06-22 PROCEDURE — 3075F SYST BP GE 130 - 139MM HG: CPT | Mod: CPTII,,, | Performed by: INTERNAL MEDICINE

## 2023-06-22 PROCEDURE — 99214 OFFICE O/P EST MOD 30 MIN: CPT | Mod: ,,, | Performed by: INTERNAL MEDICINE

## 2023-06-22 PROCEDURE — 3288F FALL RISK ASSESSMENT DOCD: CPT | Mod: CPTII,,, | Performed by: INTERNAL MEDICINE

## 2023-06-22 PROCEDURE — 1101F PR PT FALLS ASSESS DOC 0-1 FALLS W/OUT INJ PAST YR: ICD-10-PCS | Mod: CPTII,,, | Performed by: INTERNAL MEDICINE

## 2023-06-22 PROCEDURE — 3288F PR FALLS RISK ASSESSMENT DOCUMENTED: ICD-10-PCS | Mod: CPTII,,, | Performed by: INTERNAL MEDICINE

## 2023-06-22 PROCEDURE — 1160F PR REVIEW ALL MEDS BY PRESCRIBER/CLIN PHARMACIST DOCUMENTED: ICD-10-PCS | Mod: CPTII,,, | Performed by: INTERNAL MEDICINE

## 2023-06-22 PROCEDURE — 1101F PT FALLS ASSESS-DOCD LE1/YR: CPT | Mod: CPTII,,, | Performed by: INTERNAL MEDICINE

## 2023-06-22 PROCEDURE — 83036 HEMOGLOBIN GLYCOSYLATED A1C: CPT | Mod: QW,,, | Performed by: INTERNAL MEDICINE

## 2023-06-22 PROCEDURE — 1159F PR MEDICATION LIST DOCUMENTED IN MEDICAL RECORD: ICD-10-PCS | Mod: CPTII,,, | Performed by: INTERNAL MEDICINE

## 2023-06-22 PROCEDURE — 3078F PR MOST RECENT DIASTOLIC BLOOD PRESSURE < 80 MM HG: ICD-10-PCS | Mod: CPTII,,, | Performed by: INTERNAL MEDICINE

## 2023-06-22 PROCEDURE — 99214 PR OFFICE/OUTPT VISIT, EST, LEVL IV, 30-39 MIN: ICD-10-PCS | Mod: ,,, | Performed by: INTERNAL MEDICINE

## 2023-06-22 PROCEDURE — 1125F PR PAIN SEVERITY QUANTIFIED, PAIN PRESENT: ICD-10-PCS | Mod: CPTII,,, | Performed by: INTERNAL MEDICINE

## 2023-06-22 NOTE — PROGRESS NOTES
Naz Lugo MD   [address] [place] [address]     Patient ID: [id number]     Chief Complaint: Diabetes        HPI:     Demetrice Barrett is a 77 y.o. female here today for a follow up of HTN, DM, Dementia, Hx Diastolic dysfunction and possible PVD. No other complaints today.       Subjective:     Review of Systems   Constitutional: Negative.    Respiratory: Negative.     Cardiovascular: Negative.         Doesn't remember going to heart doctor any time recent, she can't say when last echo and Us done   Neurological:         Memory is just poor, she forgot her visit last week, she notes it's just hard to remember   Endo/Heme/Allergies:         Didn't bring any logs on sugars, thinks it is ok   Psychiatric/Behavioral:  Negative for depression.         Just doesn't like going to the doctor     Past Medical History:   Diagnosis Date    COVID-19 07/08/2020    Diabetes mellitus type II, uncontrolled     HTN (hypertension)     Hypercholesterolemia     Internal hemorrhoids     Obesity     Tubular adenoma of colon         Past Surgical History:   Procedure Laterality Date    COLONOSCOPY N/A 12/04/2020    COLONOSCOPY W/ BIOPSIES AND POLYPECTOMY      TOTAL ABDOMINAL HYSTERECTOMY         Family History   Problem Relation Age of Onset    Hyperlipidemia Mother     Hypertension Mother     Kidney disease Mother     Gout Mother     Stroke Sister     Hypertension Sister     Diabetes Sister     Heart disease Sister     Anemia Sister     Kidney disease Sister         Social History     Socioeconomic History    Marital status:    Tobacco Use    Smoking status: Never    Smokeless tobacco: Never   Substance and Sexual Activity    Alcohol use: Never    Drug use: Never    Sexual activity: Not Currently     Social Determinants of Health     Financial Resource Strain: Low Risk     Difficulty of Paying Living Expenses: Not hard at all   Food Insecurity: No Food Insecurity    Worried About Running Out of Food in the Last Year: Never true     Ran Out of Food in the Last Year: Never true   Transportation Needs: No Transportation Needs    Lack of Transportation (Medical): No    Lack of Transportation (Non-Medical): No   Physical Activity: Sufficiently Active    Days of Exercise per Week: 5 days    Minutes of Exercise per Session: 30 min   Stress: No Stress Concern Present    Feeling of Stress : Only a little   Social Connections: Socially Integrated    Frequency of Communication with Friends and Family: More than three times a week    Frequency of Social Gatherings with Friends and Family: Three times a week    Attends [employer] Services: 1 to 4 times per year    Active Member of Clubs or Organizations: Yes    Attends Club or Organization Meetings: 1 to 4 times per year    Marital Status:    Housing Stability: Low Risk     Unable to Pay for Housing in the Last Year: No    Number of Places Lived in the Last Year: 1    Unstable Housing in the Last Year: No       Review of patient's allergies indicates:  No Known Allergies    Outpatient Medications Marked as Taking for the 6/22/23 encounter (Office Visit) with Naz Lugo MD   Medication Sig Dispense Refill    alendronate (FOSAMAX) 70 MG tablet TAKE 1 TABLET EVERY WEEK AS DIRECTED;  SEE PACKAGE FOR ADDITIONAL INSTRUCTIONS 12 tablet 1    amLODIPine (NORVASC) 10 MG tablet Take 10 mg by mouth once daily at 6am.      donepeziL (ARICEPT) 5 MG tablet Take 1 tablet (5 mg total) by mouth 2 (two) times a day. 180 tablet 2    enalapril (VASOTEC) 20 MG tablet TAKE 1 TABLET EVERY DAY 90 tablet 1    glipiZIDE (GLUCOTROL) 5 MG tablet TAKE 1 TABLET TWICE DAILY 180 tablet 1    metFORMIN (GLUCOPHAGE) 850 MG tablet TAKE 1 TABLET TWICE DAILY 180 tablet 1    nitrofurantoin, macrocrystal-monohydrate, (MACROBID) 100 MG capsule Take 100 mg by mouth 2 (two) times daily.      pioglitazone (ACTOS) 45 MG tablet Take 1 tablet (45 mg total) by mouth once daily. 90 tablet 3    rosuvastatin (CRESTOR) 20 MG tablet Take 20 mg  "by mouth once daily at 6am.      TRUE METRIX GLUCOSE TEST STRIP Strp Inject 1 strip into the skin once daily. For diabetes E11.65 150 strip 3    TRUEPLUS LANCETS 33 gauge Misc TEST BLOOD SUGAR TWICE DAILY 200 each 3       Patient Care Team:  Naz Lugo MD as PCP - General (Internal Medicine)  Ptay Marcelino MD as Consulting Physician (Neurology)  Love Singh MD as Consulting Physician (Cardiovascular Disease)  [facility] #[id number]       Objective:     /72 (BP Location: Right arm, Patient Position: Sitting, BP Method: Large (Automatic))   Pulse 79   Temp 97.9 °F (36.6 °C) (Oral)   Resp 16   Ht 5' 2" (1.575 m)   Wt 72.6 kg (160 lb)   SpO2 98%   BMI 29.26 kg/m²     Physical Exam  Constitutional:       Appearance: She is not ill-appearing.   Cardiovascular:      Rate and Rhythm: Normal rate and regular rhythm.      Comments: Pulses present in feet but could put them as decreased  Musculoskeletal:         General: No tenderness.   Skin:     General: Skin is warm and dry.   Neurological:      Mental Status: She is alert.      Comments: Can't recall last visit with neurologist, does remember it was a lady doctor, unsure she was given follow up (appt is in August)           Office Visit on 03/13/2023   Component Date Value    WBC 03/13/2023 7.3     RBC 03/13/2023 4.35     Hemoglobin 03/13/2023 13.0     Hematocrit 03/13/2023 39.8     MCV 03/13/2023 92     MCH 03/13/2023 29.9     MCHC 03/13/2023 32.7     RDW 03/13/2023 13.2     Platelets 03/13/2023 266     Neutrophils 03/13/2023 70     Lymphs 03/13/2023 24     Monocytes 03/13/2023 5     Eos 03/13/2023 1     Basos 03/13/2023 0     Neutrophils (Absolute) 03/13/2023 5.1     Lymphs (Absolute) 03/13/2023 1.8     Monocytes(Absolute) 03/13/2023 0.4     Eos (Absolute) 03/13/2023 0.1     Baso (Absolute) 03/13/2023 0.0     Immature Granulocytes 03/13/2023 0     Specific [place], UA 03/13/2023 1.022     pH, UA 03/13/2023 5.5     Color, UA 03/13/2023 Yellow  "    Clarity, UA 03/13/2023 Clear     Leukocytes, UA 03/13/2023 1+ (A)     Protein, UA 03/13/2023 Negative     Glucose, UA 03/13/2023 3+ (A)     Ketones, UA 03/13/2023 Negative     Occult Blood UA 03/13/2023 Negative     Bilirubin, UA 03/13/2023 Negative     Urobilinogen, UA 03/13/2023 0.2     Nitrite, UA 03/13/2023 Negative     Microscopic Examination 03/13/2023 See below:     Urinalysis Reflex 03/13/2023 Comment     Glucose 03/13/2023 221 (H)     BUN 03/13/2023 13     Creatinine 03/13/2023 0.79     eGFR 03/13/2023 77     BUN/Creatinine Ratio 03/13/2023 16     Sodium 03/13/2023 139     Potassium 03/13/2023 4.3     Chloride 03/13/2023 100     CO2 03/13/2023 24     Calcium 03/13/2023 9.7     Hep C Virus Ab Signal/Cu* 03/13/2023 Non Reactive     Hemoglobin A1c 03/13/2023 8.0 (H)     WBC, UA 03/13/2023 0-5     RBC, UA 03/13/2023 None seen     Epithelial Cells (non re* 03/13/2023 0-10     Crystal Type 03/13/2023 None seen     Bacteria, UA 03/13/2023 Few     Urine Culture, Comprehen* 03/13/2023 Final report     Result 03/13/2023 Comment        Assessment/Plan:     1. Uncontrolled type 2 diabetes mellitus with hyperglycemia  Comments:  A1C was over 8, auguste ee if she's remembering to do better on diet  Orders:  -     Hemoglobin A1C, POCT    2. Essential hypertension  Comments:  Good control    3. Dementia without behavioral disturbance  Overview:  CBC CMP and TSH in 10/2021 unremarkable apart from mildly elevated LFTs, hepatic function panel in 2/2022 normal, A1C in 01/2022 8    MRI brain 10/2021 unremarkable, mild chronic microangiopathy noted    MOCA scores:  08/2022: 19/30- difficulty with language and recall      4. Congestive heart failure with LV diastolic dysfunction, NYHA class 1  Comments:  ECHO in hospital 10/22 showed grade 1 with 55% EF.will recheck  Overview:  ECHO in hospital 10/22 showed grade 1 with 55% EF.     Orders:  -     Echo; Future    5. Aortic valve insufficiency, etiology of cardiac valve disease  unspecified  Comments:  ECHO to asses    6. Decreased pulses in feet  Comments:  Check US, NP had listed angiopathy with her DM but never in chart with Dopp[facility].   Orders:  -     US Arterial Doppler Exam; Future; Expected date: 06/22/2023       NP on home visit labeled as angiopathy and heart failure, will need to set up testing. No issues in the past with these.      Follow up in about 3 months (around 9/22/2023) for Medication Managment. In addition to their scheduled follow up, the patient has also been instructed to follow up on as needed basis.     Signature:  Naz Lugo MD  [facility]  [address] [place] [address]

## 2023-06-23 DIAGNOSIS — E11.65 UNCONTROLLED TYPE 2 DIABETES MELLITUS WITH HYPERGLYCEMIA: Primary | ICD-10-CM

## 2023-06-29 ENCOUNTER — TELEPHONE (OUTPATIENT)
Dept: PRIMARY CARE CLINIC | Facility: CLINIC | Age: 78
End: 2023-06-29
Payer: MEDICARE

## 2023-06-29 NOTE — TELEPHONE ENCOUNTER
----- Message from Carolannabhi Ponce sent at 6/29/2023  3:35 PM CDT -----  Regarding: advice  Type:  Needs Medical Advice    Who Called: harjeet from  [facility]  Symptoms (please be specific):    How long has patient had these symptoms:    Pharmacy name and phone #:    Would the patient rather a call back or a response via [facility]?   Best Call Back Number: [phone]  Additional Information: harjeet called about the referral she received. She stated that they are needing the pts recent progress note and insurance information to have pt scheduled. Fax [phone]. Please advise

## 2023-06-30 RX ORDER — METFORMIN HYDROCHLORIDE 850 MG/1
TABLET ORAL
Qty: 180 TABLET | Refills: 0 | Status: SHIPPED | OUTPATIENT
Start: 2023-06-30 | End: 2023-11-07 | Stop reason: DRUGHIGH

## 2023-07-14 ENCOUNTER — HOSPITAL ENCOUNTER (OUTPATIENT)
Dept: CARDIOLOGY | Facility: HOSPITAL | Age: 78
Discharge: HOME | End: 2023-07-14
Attending: INTERNAL MEDICINE
Payer: MEDICARE

## 2023-07-14 ENCOUNTER — HOSPITAL ENCOUNTER (OUTPATIENT)
Dept: RADIOLOGY | Facility: HOSPITAL | Age: 78
Discharge: HOME | End: 2023-07-14
Attending: INTERNAL MEDICINE
Payer: MEDICARE

## 2023-07-14 DIAGNOSIS — R09.89 DECREASED PULSES IN FEET: ICD-10-CM

## 2023-07-14 DIAGNOSIS — I50.30 CONGESTIVE HEART FAILURE WITH LV DIASTOLIC DYSFUNCTION, NYHA CLASS 1: ICD-10-CM

## 2023-07-14 LAB
AV INDEX (PROSTH): 0.65
AV PEAK GRADIENT: 7 MMHG
AV VALVE AREA: 1.85 CM2
AV VELOCITY RATIO: 0.62
CV ECHO LV RWT: 0.48 CM
DOP CALC AO PEAK VEL: 1.3 M/S
DOP CALC AO VTI: 31 CM
DOP CALC LVOT AREA: 2.8 CM2
DOP CALC LVOT DIAMETER: 1.9 CM
DOP CALC LVOT PEAK VEL: 0.8 M/S
DOP CALC LVOT STROKE VOLUME: 57.24 CM3
DOP CALCLVOT PEAK VEL VTI: 20.2 CM
E/A RATIO: 0.82
ECHO LV POSTERIOR WALL: 1.2 CM (ref 0.6–1.1)
EJECTION FRACTION: 65 %
FRACTIONAL SHORTENING: 38 % (ref 28–44)
INTERVENTRICULAR SEPTUM: 1.3 CM (ref 0.6–1.1)
LEFT ATRIUM SIZE: 3.6 CM
LEFT INTERNAL DIMENSION IN SYSTOLE: 3.1 CM (ref 2.1–4)
LEFT VENTRICULAR INTERNAL DIMENSION IN DIASTOLE: 5 CM (ref 3.5–6)
LEFT VENTRICULAR MASS: 247.6 G
LVOT MV: 0.56 CM/S
MV PEAK A VEL: 1.1 M/S
MV PEAK E VEL: 0.9 M/S
RA PRESSURE: 3 MMHG

## 2023-07-14 PROCEDURE — 93306 TTE W/DOPPLER COMPLETE: CPT

## 2023-07-14 PROCEDURE — 93925 LOWER EXTREMITY STUDY: CPT | Mod: TC

## 2023-07-17 ENCOUNTER — OFFICE VISIT (OUTPATIENT)
Dept: PRIMARY CARE CLINIC | Facility: CLINIC | Age: 78
End: 2023-07-17
Payer: MEDICARE

## 2023-07-17 ENCOUNTER — TELEPHONE (OUTPATIENT)
Dept: PRIMARY CARE CLINIC | Facility: CLINIC | Age: 78
End: 2023-07-17

## 2023-07-17 VITALS
DIASTOLIC BLOOD PRESSURE: 56 MMHG | HEART RATE: 69 BPM | WEIGHT: 169.19 LBS | BODY MASS INDEX: 31.13 KG/M2 | TEMPERATURE: 98 F | RESPIRATION RATE: 16 BRPM | SYSTOLIC BLOOD PRESSURE: 121 MMHG | HEIGHT: 62 IN

## 2023-07-17 DIAGNOSIS — I73.9 PERIPHERAL VASCULAR DISEASE: ICD-10-CM

## 2023-07-17 DIAGNOSIS — I51.89 DIASTOLIC DYSFUNCTION: ICD-10-CM

## 2023-07-17 DIAGNOSIS — E11.65 UNCONTROLLED TYPE 2 DIABETES MELLITUS WITH HYPERGLYCEMIA: ICD-10-CM

## 2023-07-17 DIAGNOSIS — S39.012S STRAIN OF LUMBAR REGION, SEQUELA: Primary | ICD-10-CM

## 2023-07-17 PROBLEM — I35.8 AORTIC VALVE SCLEROSIS: Status: ACTIVE | Noted: 2023-07-17

## 2023-07-17 LAB
BUN SERPL-MCNC: 15 MG/DL (ref 8–27)
BUN/CREAT SERPL: 17 (ref 12–28)
CALCIUM SERPL-MCNC: 9.8 MG/DL (ref 8.7–10.3)
CHLORIDE SERPL-SCNC: 104 MMOL/L (ref 96–106)
CO2 SERPL-SCNC: 19 MMOL/L (ref 20–29)
CREAT SERPL-MCNC: 0.89 MG/DL (ref 0.57–1)
EST. GFR  (NO RACE VARIABLE): 66 ML/MIN/1.73
GLUCOSE SERPL-MCNC: 65 MG/DL (ref 70–99)
POTASSIUM SERPL-SCNC: 3.8 MMOL/L (ref 3.5–5.2)
SODIUM SERPL-SCNC: 140 MMOL/L (ref 134–144)

## 2023-07-17 PROCEDURE — 1125F AMNT PAIN NOTED PAIN PRSNT: CPT | Mod: CPTII,,, | Performed by: INTERNAL MEDICINE

## 2023-07-17 PROCEDURE — 3074F SYST BP LT 130 MM HG: CPT | Mod: CPTII,,, | Performed by: INTERNAL MEDICINE

## 2023-07-17 PROCEDURE — 3288F FALL RISK ASSESSMENT DOCD: CPT | Mod: CPTII,,, | Performed by: INTERNAL MEDICINE

## 2023-07-17 PROCEDURE — 1160F RVW MEDS BY RX/DR IN RCRD: CPT | Mod: CPTII,,, | Performed by: INTERNAL MEDICINE

## 2023-07-17 PROCEDURE — 3288F PR FALLS RISK ASSESSMENT DOCUMENTED: ICD-10-PCS | Mod: CPTII,,, | Performed by: INTERNAL MEDICINE

## 2023-07-17 PROCEDURE — 36415 COLL VENOUS BLD VENIPUNCTURE: CPT | Mod: ,,, | Performed by: INTERNAL MEDICINE

## 2023-07-17 PROCEDURE — 1159F PR MEDICATION LIST DOCUMENTED IN MEDICAL RECORD: ICD-10-PCS | Mod: CPTII,,, | Performed by: INTERNAL MEDICINE

## 2023-07-17 PROCEDURE — 1101F PR PT FALLS ASSESS DOC 0-1 FALLS W/OUT INJ PAST YR: ICD-10-PCS | Mod: CPTII,,, | Performed by: INTERNAL MEDICINE

## 2023-07-17 PROCEDURE — 1101F PT FALLS ASSESS-DOCD LE1/YR: CPT | Mod: CPTII,,, | Performed by: INTERNAL MEDICINE

## 2023-07-17 PROCEDURE — 1125F PR PAIN SEVERITY QUANTIFIED, PAIN PRESENT: ICD-10-PCS | Mod: CPTII,,, | Performed by: INTERNAL MEDICINE

## 2023-07-17 PROCEDURE — 3078F PR MOST RECENT DIASTOLIC BLOOD PRESSURE < 80 MM HG: ICD-10-PCS | Mod: CPTII,,, | Performed by: INTERNAL MEDICINE

## 2023-07-17 PROCEDURE — 3074F PR MOST RECENT SYSTOLIC BLOOD PRESSURE < 130 MM HG: ICD-10-PCS | Mod: CPTII,,, | Performed by: INTERNAL MEDICINE

## 2023-07-17 PROCEDURE — 99214 PR OFFICE/OUTPT VISIT, EST, LEVL IV, 30-39 MIN: ICD-10-PCS | Mod: ,,, | Performed by: INTERNAL MEDICINE

## 2023-07-17 PROCEDURE — 99214 OFFICE O/P EST MOD 30 MIN: CPT | Mod: ,,, | Performed by: INTERNAL MEDICINE

## 2023-07-17 PROCEDURE — 3078F DIAST BP <80 MM HG: CPT | Mod: CPTII,,, | Performed by: INTERNAL MEDICINE

## 2023-07-17 PROCEDURE — 36415 PR COLLECTION VENOUS BLOOD,VENIPUNCTURE: ICD-10-PCS | Mod: ,,, | Performed by: INTERNAL MEDICINE

## 2023-07-17 PROCEDURE — 1160F PR REVIEW ALL MEDS BY PRESCRIBER/CLIN PHARMACIST DOCUMENTED: ICD-10-PCS | Mod: CPTII,,, | Performed by: INTERNAL MEDICINE

## 2023-07-17 PROCEDURE — 1159F MED LIST DOCD IN RCRD: CPT | Mod: CPTII,,, | Performed by: INTERNAL MEDICINE

## 2023-07-17 NOTE — TELEPHONE ENCOUNTER
----- Message from Naz Lugo MD sent at 7/17/2023  1:12 PM CDT -----  US shows mild atherosclerosis (blockage in both legs) but nothing that would need new treatment. Just keep sugar and cholesterol good.   She may have visit today

## 2023-07-17 NOTE — PROGRESS NOTES
Naz Lugo MD   [address] [address]     Patient ID: [id number]     Chief Complaint: Right flank pain        HPI:     Demetrice Barrett is a 78 y.o. female here today for back pain. It is on the right side and goes around to the flank. No other complaints today.       Subjective:     Review of Systems   Respiratory: Negative.     Cardiovascular: Negative.    Genitourinary:  Positive for flank pain. Negative for dysuria.   Musculoskeletal:  Positive for back pain.   Endo/Heme/Allergies:         Feels like sugar drops but numbers they are getting is all high     Past Medical History:   Diagnosis Date    Aortic valve sclerosis 7/17/2023    COVID-19 07/08/2020    Diabetes mellitus type II, uncontrolled     HTN (hypertension)     Hypercholesterolemia     Internal hemorrhoids     Obesity     Peripheral vascular disease 7/17/2023    Mild on US 2023    Tubular adenoma of colon         Past Surgical History:   Procedure Laterality Date    COLONOSCOPY N/A 12/04/2020    COLONOSCOPY W/ BIOPSIES AND POLYPECTOMY      TOTAL ABDOMINAL HYSTERECTOMY         Family History   Problem Relation Age of Onset    Hyperlipidemia Mother     Hypertension Mother     Kidney disease Mother     Gout Mother     Stroke Sister     Hypertension Sister     Diabetes Sister     Heart disease Sister     Anemia Sister     Kidney disease Sister         Social History     Socioeconomic History    Marital status:    Tobacco Use    Smoking status: Never    Smokeless tobacco: Never   Substance and Sexual Activity    Alcohol use: Never    Drug use: Never    Sexual activity: Not Currently     Social Determinants of Health     Financial Resource Strain: Low Risk     Difficulty of Paying Living Expenses: Not hard at all   Food Insecurity: No Food Insecurity    Worried About Running Out of Food in the Last Year: Never true    Ran Out of Food in the Last Year: Never true   Transportation Needs: No Transportation Needs    Lack of Transportation  (Medical): No    Lack of Transportation (Non-Medical): No   Physical Activity: Sufficiently Active    Days of Exercise per Week: 5 days    Minutes of Exercise per Session: 30 min   Stress: No Stress Concern Present    Feeling of Stress : Only a little   Social Connections: Socially Integrated    Frequency of Communication with Friends and Family: More than three times a week    Frequency of Social Gatherings with Friends and Family: Three times a week    Attends Protestant Services: 1 to 4 times per year    Active Member of Clubs or Organizations: Yes    Attends Club or Organization Meetings: 1 to 4 times per year    Marital Status:    Housing Stability: Low Risk     Unable to Pay for Housing in the Last Year: No    Number of Places Lived in the Last Year: 1    Unstable Housing in the Last Year: No       Review of patient's allergies indicates:  No Known Allergies    Outpatient Medications Marked as Taking for the 7/17/23 encounter (Office Visit) with Naz Lugo MD   Medication Sig Dispense Refill    alendronate (FOSAMAX) 70 MG tablet TAKE 1 TABLET EVERY WEEK AS DIRECTED;  SEE PACKAGE FOR ADDITIONAL INSTRUCTIONS 12 tablet 1    amLODIPine (NORVASC) 10 MG tablet Take 10 mg by mouth once daily at 6am.      enalapril (VASOTEC) 20 MG tablet TAKE 1 TABLET EVERY DAY 90 tablet 1    glipiZIDE (GLUCOTROL) 5 MG tablet TAKE 1 TABLET TWICE DAILY 180 tablet 1    metFORMIN (GLUCOPHAGE) 850 MG tablet TAKE 1 TABLET TWICE DAILY 180 tablet 0    pioglitazone (ACTOS) 45 MG tablet Take 1 tablet (45 mg total) by mouth once daily. 90 tablet 3    rosuvastatin (CRESTOR) 20 MG tablet Take 20 mg by mouth once daily at 6am.      TRUE METRIX GLUCOSE TEST STRIP Strp Inject 1 strip into the skin once daily. For diabetes E11.65 150 strip 3    TRUEPLUS LANCETS 33 gauge Misc TEST BLOOD SUGAR TWICE DAILY 200 each 3       Patient Care Team:  Naz Lugo MD as PCP - General (Internal Medicine)  Paty Marcelino MD as Consulting  "Physician (Neurology)  Love Singh MD as Consulting Physician (Cardiovascular Disease)  [facility] #[id number]       Objective:     BP (!) 121/56   Pulse 69   Temp 97.8 °F (36.6 °C)   Resp 16   Ht 5' 2" (1.575 m)   Wt 76.7 kg (169 lb 3.2 oz)   BMI 30.95 kg/m²     Physical Exam  Vitals reviewed.   Constitutional:       Appearance: She is not ill-appearing.   Cardiovascular:      Rate and Rhythm: Normal rate and regular rhythm.   Pulmonary:      Effort: Pulmonary effort is normal.      Breath sounds: Normal breath sounds.   Musculoskeletal:         General: Tenderness and deformity present.      Comments: Slight lordosis curvature, tender at the right SI region   Skin:     General: Skin is warm and dry.   Neurological:      Mental Status: She is alert and oriented to person, place, and time.      Motor: No weakness.      Gait: Gait normal.   Psychiatric:      Comments: Doesn't like being on all this medication, doesn't understand why her sugars will be high, doesn't feel like she eats much.              Assessment/Plan:     1. Strain of lumbar region, sequela    2. Peripheral vascular disease  Overview:  Mild on US 2023      3. Diastolic dysfunction  Comments:  ECHO shows mild sclerosis and AR    4. Uncontrolled type 2 diabetes mellitus with hyperglycemia  Comments:  She feels low now, our meter is out, will draw  Orders:  -     Basic Metabolic Panel             Follow up in about 3 months (around 10/17/2023) for Wellness, Uncontrolled Diabetes. In addition to their scheduled follow up, the patient has also been instructed to follow up on as needed basis.     Signature:  Naz Lugo MD  [facility]  [address] [address]    "

## 2023-07-19 ENCOUNTER — TELEPHONE (OUTPATIENT)
Dept: PRIMARY CARE CLINIC | Facility: CLINIC | Age: 78
End: 2023-07-19
Payer: MEDICARE

## 2023-07-19 NOTE — TELEPHONE ENCOUNTER
----- Message from Naz Lugo MD sent at 7/18/2023  7:40 PM CDT -----  Her sugar was on the low side and A1C went up with med adjustment so I really do think she has more diabetic medication than she needs. Stop the Glipizide, follow the diet and recheck sugars and call us readings in 2 weeks.

## 2023-07-20 ENCOUNTER — TELEPHONE (OUTPATIENT)
Dept: PRIMARY CARE CLINIC | Facility: CLINIC | Age: 78
End: 2023-07-20
Payer: MEDICARE

## 2023-07-20 DIAGNOSIS — F03.90 DEMENTIA WITHOUT BEHAVIORAL DISTURBANCE: Primary | ICD-10-CM

## 2023-07-20 NOTE — TELEPHONE ENCOUNTER
Spoke to daughter requesting this referral to Adult day health care to get out . Fax # [phone]

## 2023-07-20 NOTE — TELEPHONE ENCOUNTER
----- Message from April Stewart sent at 7/20/2023  2:58 PM CDT -----  Regarding: med advice  .Type:  Needs Medical Advice    Who Called: patient's daughter  Symptoms (please be specific):    How long has patient had these symptoms:    Pharmacy name and phone #:    Would the patient rather a call back or a response via [facility]? Call back  [facility] Call Back Number: [phone]  Additional Information: patient's daughter is requesting a call back regarding possibly getting a referral for an adult day care center in [place]. Please advise.

## 2023-07-20 NOTE — TELEPHONE ENCOUNTER
----- Message from April Stewart sent at 7/20/2023  2:56 PM CDT -----  Regarding: med refill  .Type:  RX Refill Request    Who Called: patient's daughter  Refill or New Rx: refill  RX Name and Strength: glipiZIDE tablet 5 mg  How is the patient currently taking it? (ex. 1XDay):  Is this a 30 day or 90 day RX:  Preferred Pharmacy with phone number: [facility] - [facility] [address]  Local or Mail Order: mail  Ordering Provider:  Would the patient rather a call back or a response via [facility]?  Call back  [facility] Call Back Number: [phone]  Additional Information:  patient's daughter is requesting a refill.

## 2023-07-20 NOTE — TELEPHONE ENCOUNTER
Spoke to daughter states she will call the pharmacy and check at home for her medication she should have 2 bottles left.

## 2023-09-12 ENCOUNTER — TELEPHONE (OUTPATIENT)
Dept: PRIMARY CARE CLINIC | Facility: CLINIC | Age: 78
End: 2023-09-12
Payer: MEDICARE

## 2023-09-12 NOTE — TELEPHONE ENCOUNTER
She was to stop the Glipizide, her sugar was low with a higher A1C so I really think she's dipping too low at night.

## 2023-09-12 NOTE — TELEPHONE ENCOUNTER
----- Message from Carolann Ponce sent at 9/12/2023  1:18 PM CDT -----  Regarding: refill  Type:  RX Refill Request    Who Called: ot  Refill or New Rx:refill  RX Name and Strength:glipiZIDE (GLUCOTROL) 5 MG tablet  How is the patient currently taking it? (ex. 1XDay):1xday  Is this a 30 day or 90 day RX:90  [facility] or Mail Order:mail  Ordering Provider:yajaira luna  Would the patient rather a call back or a response via [facility]?   [facility] Call Back Number:[phone]  Additional Information: pt called about needing a refill on medication

## 2023-09-12 NOTE — TELEPHONE ENCOUNTER
Spoke to daughter requesting refill on glipizide 5 mg 1 bid.I am not seeing it on the medication list but its on your last visit note.

## 2023-09-25 ENCOUNTER — OFFICE VISIT (OUTPATIENT)
Dept: PRIMARY CARE CLINIC | Facility: CLINIC | Age: 78
End: 2023-09-25
Payer: MEDICARE

## 2023-09-25 VITALS
OXYGEN SATURATION: 98 % | HEIGHT: 62 IN | SYSTOLIC BLOOD PRESSURE: 135 MMHG | HEART RATE: 80 BPM | RESPIRATION RATE: 15 BRPM | BODY MASS INDEX: 30.62 KG/M2 | DIASTOLIC BLOOD PRESSURE: 76 MMHG | WEIGHT: 166.38 LBS | TEMPERATURE: 98 F

## 2023-09-25 DIAGNOSIS — F03.90 DEMENTIA WITHOUT BEHAVIORAL DISTURBANCE: ICD-10-CM

## 2023-09-25 DIAGNOSIS — E11.649 TYPE 2 DIABETES MELLITUS WITH HYPOGLYCEMIA WITHOUT COMA, WITHOUT LONG-TERM CURRENT USE OF INSULIN: Primary | ICD-10-CM

## 2023-09-25 DIAGNOSIS — E11.65 UNCONTROLLED TYPE 2 DIABETES MELLITUS WITH HYPERGLYCEMIA: ICD-10-CM

## 2023-09-25 LAB — HBA1C MFR BLD: 8.9 %

## 2023-09-25 PROCEDURE — 1160F RVW MEDS BY RX/DR IN RCRD: CPT | Mod: CPTII,,, | Performed by: INTERNAL MEDICINE

## 2023-09-25 PROCEDURE — 99214 PR OFFICE/OUTPT VISIT, EST, LEVL IV, 30-39 MIN: ICD-10-PCS | Mod: ,,, | Performed by: INTERNAL MEDICINE

## 2023-09-25 PROCEDURE — 1126F PR PAIN SEVERITY QUANTIFIED, NO PAIN PRESENT: ICD-10-PCS | Mod: CPTII,,, | Performed by: INTERNAL MEDICINE

## 2023-09-25 PROCEDURE — 3288F PR FALLS RISK ASSESSMENT DOCUMENTED: ICD-10-PCS | Mod: CPTII,,, | Performed by: INTERNAL MEDICINE

## 2023-09-25 PROCEDURE — 3078F DIAST BP <80 MM HG: CPT | Mod: CPTII,,, | Performed by: INTERNAL MEDICINE

## 2023-09-25 PROCEDURE — 3078F PR MOST RECENT DIASTOLIC BLOOD PRESSURE < 80 MM HG: ICD-10-PCS | Mod: CPTII,,, | Performed by: INTERNAL MEDICINE

## 2023-09-25 PROCEDURE — 1160F PR REVIEW ALL MEDS BY PRESCRIBER/CLIN PHARMACIST DOCUMENTED: ICD-10-PCS | Mod: CPTII,,, | Performed by: INTERNAL MEDICINE

## 2023-09-25 PROCEDURE — 1159F MED LIST DOCD IN RCRD: CPT | Mod: CPTII,,, | Performed by: INTERNAL MEDICINE

## 2023-09-25 PROCEDURE — 1101F PT FALLS ASSESS-DOCD LE1/YR: CPT | Mod: CPTII,,, | Performed by: INTERNAL MEDICINE

## 2023-09-25 PROCEDURE — 83036 POCT HEMOGLOBIN A1C: ICD-10-PCS | Mod: QW,,, | Performed by: INTERNAL MEDICINE

## 2023-09-25 PROCEDURE — 1159F PR MEDICATION LIST DOCUMENTED IN MEDICAL RECORD: ICD-10-PCS | Mod: CPTII,,, | Performed by: INTERNAL MEDICINE

## 2023-09-25 PROCEDURE — 3288F FALL RISK ASSESSMENT DOCD: CPT | Mod: CPTII,,, | Performed by: INTERNAL MEDICINE

## 2023-09-25 PROCEDURE — 83036 HEMOGLOBIN GLYCOSYLATED A1C: CPT | Mod: QW,,, | Performed by: INTERNAL MEDICINE

## 2023-09-25 PROCEDURE — 1101F PR PT FALLS ASSESS DOC 0-1 FALLS W/OUT INJ PAST YR: ICD-10-PCS | Mod: CPTII,,, | Performed by: INTERNAL MEDICINE

## 2023-09-25 PROCEDURE — 1126F AMNT PAIN NOTED NONE PRSNT: CPT | Mod: CPTII,,, | Performed by: INTERNAL MEDICINE

## 2023-09-25 PROCEDURE — 3075F PR MOST RECENT SYSTOLIC BLOOD PRESS GE 130-139MM HG: ICD-10-PCS | Mod: CPTII,,, | Performed by: INTERNAL MEDICINE

## 2023-09-25 PROCEDURE — 3075F SYST BP GE 130 - 139MM HG: CPT | Mod: CPTII,,, | Performed by: INTERNAL MEDICINE

## 2023-09-25 PROCEDURE — 99214 OFFICE O/P EST MOD 30 MIN: CPT | Mod: ,,, | Performed by: INTERNAL MEDICINE

## 2023-09-25 RX ORDER — CALCIUM CITRATE/VITAMIN D3 200MG-6.25
1 TABLET ORAL DAILY
Qty: 150 STRIP | Refills: 3 | Status: SHIPPED | OUTPATIENT
Start: 2023-09-25 | End: 2024-03-21 | Stop reason: SDUPTHER

## 2023-09-25 NOTE — PATIENT INSTRUCTIONS
Patient Education       Diabetic Meal Planning   About this topic   Healthy eating is an important part of keeping your diabetes in control. A balanced diet along with your diabetic drugs will help you control your blood sugar. The right portions of healthy foods may help keep your sugar within your goal range. It may also help to lower or maintain your weight, manage cholesterol, the amount of fat in your blood, and blood pressure.       What will the results be?   Healthy eating may help you lower your blood sugar and keep it in a safe range. Keeping your blood sugar in a safe range may lower your chances for long term problems from your diabetes. You may be less likely to have damage to your kidneys, heart, eyes, or nerves.  What changes to diet are needed?   Healthy eating means:  Eating a range of foods from all food groups.  Eating the right size portions. Read the nutrition facts on each food you eat.  Eating meals and snacks at the same time each day. Do not skip a meal or snack. Skipping meals may cause your blood sugar to go too low, especially if you are on drugs for your diabetes. Skipping meals can also cause you to eat too much at the next meal.  Talk to your diabetes educator about making a personal meal plan for you. They can also help you eat the foods you like by modifying them to be healthier.  Who should use this diet?   This diet is helpful to people with high blood sugar or diabetes.   What foods are good to eat?   It is important to make a healthy meal. Eat a variety of different foods in the right portion.  Fill half of your plate with non-starchy vegetables. Non-starchy vegetables include: Broccoli, green beans, carrots, greens (cam[place], kale, mustard, turnip), onions, tomatoes, asparagus, beets, cauliflower, celery, and cucumber. Non-starchy vegetables are high in fiber and low in carbohydrates. These will help keep you full for longer without raising your blood sugar.  Fill 1/4 of your  plate with carbohydrates. Try to choose whole grain options. Whole-grain products have more fiber which will make you feel full. Carbohydrates include: Bread, rice, pasta, and starchy vegetables. Starchy vegetables include beans, potatoes, acorn squash, butternut squash, corn, and peas.  Fill 1/4 of your plate with protein. Choose low-fat cuts of meat like boneless, skinless chicken breast; pork loin; 90% lean beef; lean and skinless turkey meat; and fresh fish (not fried) such as tuna, salmon, or mackerel.  Add a low-fat or non-fat dairy product like 8 ounces (240 mL) of 1% or skim milk or 6 ounces (180 mL) of low-fat yogurt. Eat the recommended serving. Milk and yogurt have carbohydrates which raise your blood sugar.  Add a small piece of fruit or 1/2 cup (120 grams) of frozen or canned fruit. Choose canned fruits in juice or water. Fruits include apples, bananas, peaches, pears, pineapple, plums, and oranges. Eat the recommended serving. Fruit has carbohydrates which can raise your blood sugar.  Include healthy fats in your meal like olive oil, canola oil, avocado, and nuts.  Other good foods to include in your diet are:  Foods high in fiber. Adding fiber to your meals may help control your blood sugar and cholesterol levels. It may also help with weight loss and prevent belly problems. If you are younger than 50, it is recommended to get 25 to 38 grams per day. If you are older than 50, it is recommended to get 21 to 30 grams per day. Good sources of fiber include:  Nuts and seeds  Beans, peas, and other legumes  Whole-grain products  Fruits  Vegetables  Smart snacks in the right portion. Do not go too long in between meals. Ask your dietitian when you should have a snack in between your meals. Snack on things like:  Nuts  Vegetables and low-fat dressing  Light popcorn  Low-fat cheese and crackers  1/2 sandwich  What foods should be limited or avoided?   You may need to limit the amount of some foods you eat and  how often you eat them. Foods that should be limited include:  High fat or processed foods like:  Ruiz  Sausage  Hot dogs  Whole-fat dairy products  Potato chips  Foods high in sodium like:  Canned soups  Soy sauce and some salad dressings  Cured meats  Salted snack foods like pretzels  Olives  Fats and oils like:  Margarine  Salad dressing  Gravy  Lard or shortening  Foods high in sugar like:  Candy  Cookies  Cake  Ice cream  Table sugar  Soda  Juice drinks  Starches that are not whole grain like:  White rice  French fries  White pasta  White bread  Sugary cereals  Baked goods, pastries, croissants  Beer, wine, and mixed drinks (alcohol). Drink alcohol in moderation (1 drink per day or less for adult women and 2 drinks per day or less for adult men). Drinking alcohol can cause fluctuations in your blood sugar and interfere with how your diabetic drugs work. Talk to your doctor about how you can safely include alcohol into your diet.  What can be done to prevent this health problem?   Some people have a higher chance of developing diabetes than others. This is a life-long problem. You can still lead a normal life. Diabetes can be managed through diet, exercise, and drugs. It is important to have support from your family and friends to help you with your goals.  When do I need to call the doctor?   Blood sugar level is above 240 mg/dL for more than a day  Blood sugar level drops to less than 40 and does not respond to 15 grams of simple carbohydrate, like 4 glucose tablets or 1/2 cup (120 mL) of fruit juice  Trouble breathing  Very sleepy and trouble concentrating  Feeling very thirsty  Needing to urinate (pee) more than normal  Throw up more than once or have many loose stools  You are so sick you cannot eat or drink  Fever over 101°F (38°C)  Questions about your diet plan  You are not feeling better in 2 to 3 days or you are feeling worse  Helpful tips   Plan ahead. Plan your meals and grocery list before going to  the store. This will help you to choose foods that are good for you.  Pack a lunch. Take healthy meals and snacks with you to work.  Keep a food journal to help keep you on track. Take note of foods that keep your blood sugar in goal range. Also note foods and meals that raise or lower your blood sugar. There are apps for your phone that can help with this.  Visit a restaurant's website before eating out. You can see the menu options and nutritional facts. This way, you can see which items best fit into your diet plan. Call ahead if you have questions.  Last Reviewed Date   2021-03-18  Consumer Information Use and Disclaimer   This information is not specific medical advice and does not replace information you receive from your health care provider. This is only a brief summary of general information. It does NOT include all information about conditions, illnesses, injuries, tests, procedures, treatments, therapies, discharge instructions or life-style choices that may apply to you. You must talk with your health care provider for complete information about your health and treatment options. This information should not be used to decide whether or not to accept your health care providers advice, instructions or recommendations. Only your health care provider has the knowledge and training to provide advice that is right for you.   Copyright   Copyright © 2021 Up[employer], Inc[employer] and its affiliates and/or licensors. All rights reserved.

## 2023-09-25 NOTE — PROGRESS NOTES
Naz Lugo MD   [address] [address]     Patient ID: [id number]     Chief Complaint: 3 months follow up for DM        HPI:     Demetrice Barrett is a 78 y.o. female here today with her daughter for a follow up of diabetes. She notes they are up and down. She notes it is mostly the soda pop. She has problems with memory and can't really give me any of her recent results.  No other complaints today.       Subjective:     Review of Systems   Respiratory: Negative.     Cardiovascular: Negative.    Gastrointestinal: Negative.    Neurological:         The medication Dr Marcelino gave her caused her to be dizzy so they stopped it. She had been going to make a referral for grief counseling but they never heard from the referral and she hasn't really had a return visit scheduled. She tried Prevagen but it made her feel drugged so she stopped that as well.    Psychiatric/Behavioral:          Getting out more and driving again so the grief is working itself out.        Past Medical History:   Diagnosis Date    Aortic valve sclerosis 7/17/2023    COVID-19 07/08/2020    Diabetes mellitus type II, uncontrolled     HTN (hypertension)     Hypercholesterolemia     Internal hemorrhoids     Obesity     Peripheral vascular disease 7/17/2023    Mild on US 2023    Tubular adenoma of colon         Past Surgical History:   Procedure Laterality Date    COLONOSCOPY N/A 12/04/2020    COLONOSCOPY W/ BIOPSIES AND POLYPECTOMY      TOTAL ABDOMINAL HYSTERECTOMY         Family History   Problem Relation Age of Onset    Hyperlipidemia Mother     Hypertension Mother     Kidney disease Mother     Gout Mother     Stroke Sister     Hypertension Sister     Diabetes Sister     Heart disease Sister     Anemia Sister     Kidney disease Sister         Social History     Socioeconomic History    Marital status:    Tobacco Use    Smoking status: Never    Smokeless tobacco: Never   Substance and Sexual Activity    Alcohol use: Never    Drug use:  Never    Sexual activity: Not Currently     Social Determinants of Health     Financial Resource Strain: Low Risk  (6/22/2023)    Overall Financial Resource Strain (CARDIA)     Difficulty of Paying Living Expenses: Not hard at all   Food Insecurity: No Food Insecurity (6/22/2023)    Hunger Vital Sign     Worried About Running Out of Food in the Last Year: Never true     Ran Out of Food in the Last Year: Never true   Transportation Needs: No Transportation Needs (6/22/2023)    PRAPARE - Transportation     Lack of Transportation (Medical): No     Lack of Transportation (Non-Medical): No   Physical Activity: Sufficiently Active (6/22/2023)    Exercise Vital Sign     Days of Exercise per Week: 5 days     Minutes of Exercise per Session: 30 min   Stress: No Stress Concern Present (6/22/2023)    [employer] - Occupational Stress Questionnaire     Feeling of Stress : Only a little   Social Connections: Socially Integrated (6/22/2023)    Social Connection and Isolation Panel [NHANES]     Frequency of Communication with Friends and Family: More than three times a week     Frequency of Social Gatherings with Friends and Family: Three times a week     Attends Anabaptism Services: 1 to 4 times per year     Active Member of Clubs or Organizations: Yes     Attends Club or Organization Meetings: 1 to 4 times per year     Marital Status:    Housing Stability: Low Risk  (6/22/2023)    Housing Stability Vital Sign     Unable to Pay for Housing in the Last Year: No     Number of Places Lived in the Last Year: 1     Unstable Housing in the Last Year: No       Review of patient's allergies indicates:  No Known Allergies    Outpatient Medications Marked as Taking for the 9/25/23 encounter (Office Visit) with Naz Lugo MD   Medication Sig Dispense Refill    alendronate (FOSAMAX) 70 MG tablet TAKE 1 TABLET EVERY WEEK AS DIRECTED;  SEE PACKAGE FOR ADDITIONAL INSTRUCTIONS 12 tablet 1    amLODIPine  "(NORVASC) 10 MG tablet Take 10 mg by mouth once daily at 6am.      enalapril (VASOTEC) 20 MG tablet TAKE 1 TABLET EVERY DAY 90 tablet 1    metFORMIN (GLUCOPHAGE) 850 MG tablet TAKE 1 TABLET TWICE DAILY 180 tablet 0    pioglitazone (ACTOS) 45 MG tablet Take 1 tablet (45 mg total) by mouth once daily. 90 tablet 3    rosuvastatin (CRESTOR) 20 MG tablet Take 20 mg by mouth once daily at 6am.      TRUEPLUS LANCETS 33 gauge Misc TEST BLOOD SUGAR TWICE DAILY 200 each 3    [DISCONTINUED] TRUE METRIX GLUCOSE TEST STRIP Strp Inject 1 strip into the skin once daily. For diabetes E11.65 150 strip 3       Patient Care Team:  Naz Lugo MD as PCP - General (Internal Medicine)  Paty Marcelino MD as Consulting Physician (Neurology)  Love Singh MD as Consulting Physician (Cardiovascular Disease)  #[id number], [facility]       Objective:     /76 (BP Location: Left arm, Patient Position: Sitting, BP Method: Medium (Automatic))   Pulse 80   Temp 98.1 °F (36.7 °C)   Resp 15   Ht 5' 2" (1.575 m)   Wt 75.5 kg (166 lb 6.4 oz)   SpO2 98%   BMI 30.43 kg/m²     Physical Exam  Vitals reviewed.   Constitutional:       Appearance: She is not ill-appearing.   Cardiovascular:      Rate and Rhythm: Normal rate and regular rhythm.   Pulmonary:      Effort: Pulmonary effort is normal.      Breath sounds: Normal breath sounds.   Neurological:      Mental Status: She is alert.               Assessment/Plan:     1. Type 2 diabetes mellitus with hypoglycemia without coma, without long-term current use of insulin  Comments:  No recent symptoms, she doesn't recall readings  Orders:  -     Hemoglobin A1C, POCT  -     Ambulatory referral/consult to Nutrition Services; Future; Expected date: 10/02/2023    2. Uncontrolled type 2 diabetes mellitus with hyperglycemia  Comments:  Still not doing great with her diet, encouraged to keep trying.  Orders:  -     Ambulatory referral/consult to Nutrition Services; Future; Expected date: " 10/02/2023    3. Dementia without behavioral disturbance  Comments:  With past medications side effects she and her daughter would rather not try memantine.   Overview:  CBC CMP and TSH in 10/2021 unremarkable apart from mildly elevated LFTs, hepatic function panel in 2/2022 normal, A1C in 01/2022 8    MRI brain 10/2021 unremarkable, mild chronic microangiopathy noted    MOCA scores:  08/2022: 19/30- difficulty with language and recall      Other orders  -     TRUE METRIX GLUCOSE TEST STRIP Strp; Inject 1 strip into the skin once daily. For diabetes E11.65  Dispense: 150 strip; Refill: 3       Discussed vegetables she can have a lot of, willing to talk with dietician to work on better choices and serving sizes.       Follow up in about 3 months (around 12/25/2023) for Uncontrolled Diabetes. In addition to their scheduled follow up, the patient has also been instructed to follow up on as needed basis.     Signature:  Naz Lugo MD  [facility]  [address] [address]

## 2023-10-31 ENCOUNTER — TELEPHONE (OUTPATIENT)
Dept: PRIMARY CARE CLINIC | Facility: CLINIC | Age: 78
End: 2023-10-31
Payer: MEDICARE

## 2023-10-31 NOTE — TELEPHONE ENCOUNTER
Tried to confirm appt   NA, VM to return call to clinic if appt time no longer works.    QUALITY-DO NOT ASK PATIENT  -PNEUMONIA  PREVANR 13:-  PREVNAR 20:-  PNEUMOVAX 23:-    -COLONOSCOPY:12/4/20    -MAMMOGRAM:8/26/19    -CERVICAL SCREEN/PAP SMEAR:HYSTERECTOMY    -BONE DENSITY:12/27/22    -DIABETES SCREEN  A1C:9/25/23  MICROALBUMIN UA:8/17/22  EYE EXAM:-  FOOT EXAM:-

## 2023-11-06 ENCOUNTER — OFFICE VISIT (OUTPATIENT)
Dept: PRIMARY CARE CLINIC | Facility: CLINIC | Age: 78
End: 2023-11-06
Payer: MEDICARE

## 2023-11-06 VITALS
OXYGEN SATURATION: 98 % | WEIGHT: 166 LBS | BODY MASS INDEX: 32.59 KG/M2 | HEART RATE: 71 BPM | DIASTOLIC BLOOD PRESSURE: 70 MMHG | SYSTOLIC BLOOD PRESSURE: 139 MMHG | RESPIRATION RATE: 16 BRPM | HEIGHT: 60 IN | TEMPERATURE: 98 F

## 2023-11-06 DIAGNOSIS — F03.90 DEMENTIA WITHOUT BEHAVIORAL DISTURBANCE: ICD-10-CM

## 2023-11-06 DIAGNOSIS — Z00.00 MEDICARE ANNUAL WELLNESS VISIT, SUBSEQUENT: Primary | ICD-10-CM

## 2023-11-06 DIAGNOSIS — I10 ESSENTIAL HYPERTENSION: ICD-10-CM

## 2023-11-06 DIAGNOSIS — D12.6 TUBULAR ADENOMA OF COLON: ICD-10-CM

## 2023-11-06 DIAGNOSIS — I73.9 PERIPHERAL VASCULAR DISEASE: ICD-10-CM

## 2023-11-06 DIAGNOSIS — E11.65 UNCONTROLLED TYPE 2 DIABETES MELLITUS WITH HYPERGLYCEMIA: ICD-10-CM

## 2023-11-06 DIAGNOSIS — I50.30 CONGESTIVE HEART FAILURE WITH LV DIASTOLIC DYSFUNCTION, NYHA CLASS 1: ICD-10-CM

## 2023-11-06 DIAGNOSIS — M81.0 AGE-RELATED OSTEOPOROSIS WITHOUT CURRENT PATHOLOGICAL FRACTURE: ICD-10-CM

## 2023-11-06 DIAGNOSIS — E78.00 HYPERCHOLESTEROLEMIA: ICD-10-CM

## 2023-11-06 DIAGNOSIS — I35.1 AORTIC VALVE INSUFFICIENCY, ETIOLOGY OF CARDIAC VALVE DISEASE UNSPECIFIED: ICD-10-CM

## 2023-11-06 LAB
ALBUMIN SERPL-MCNC: 4.5 G/DL (ref 3.8–4.8)
ALBUMIN/GLOB SERPL: 1.8 {RATIO} (ref 1.2–2.2)
ALP SERPL-CCNC: 79 IU/L (ref 44–121)
ALT SERPL-CCNC: 26 IU/L (ref 0–32)
AST SERPL-CCNC: 23 IU/L (ref 0–40)
BILIRUB SERPL-MCNC: 0.3 MG/DL (ref 0–1.2)
BUN SERPL-MCNC: 9 MG/DL (ref 8–27)
BUN/CREAT SERPL: 11 (ref 12–28)
CALCIUM SERPL-MCNC: 9.1 MG/DL (ref 8.7–10.3)
CHLORIDE SERPL-SCNC: 101 MMOL/L (ref 96–106)
CHOLEST SERPL-MCNC: 218 MG/DL (ref 100–199)
CO2 SERPL-SCNC: 24 MMOL/L (ref 20–29)
CREAT SERPL-MCNC: 0.81 MG/DL (ref 0.57–1)
EST. GFR  (NO RACE VARIABLE): 74 ML/MIN/1.73
GLOBULIN SER CALC-MCNC: 2.5 G/DL (ref 1.5–4.5)
GLUCOSE SERPL-MCNC: 253 MG/DL (ref 70–99)
HBA1C MFR BLD: 9.7 % (ref 4.8–5.6)
HDLC SERPL-MCNC: 49 MG/DL
LDLC SERPL CALC-MCNC: 135 MG/DL (ref 0–99)
POTASSIUM SERPL-SCNC: 4.1 MMOL/L (ref 3.5–5.2)
PROT SERPL-MCNC: 7 G/DL (ref 6–8.5)
SODIUM SERPL-SCNC: 138 MMOL/L (ref 134–144)
TRIGL SERPL-MCNC: 193 MG/DL (ref 0–149)
TSH SERPL DL<=0.005 MIU/L-ACNC: 1.92 UIU/ML (ref 0.45–4.5)
VLDLC SERPL CALC-MCNC: 34 MG/DL (ref 5–40)

## 2023-11-06 PROCEDURE — 1101F PR PT FALLS ASSESS DOC 0-1 FALLS W/OUT INJ PAST YR: ICD-10-PCS | Mod: CPTII,,, | Performed by: INTERNAL MEDICINE

## 2023-11-06 PROCEDURE — 1124F ACP DISCUSS-NO DSCNMKR DOCD: CPT | Mod: CPTII,,, | Performed by: INTERNAL MEDICINE

## 2023-11-06 PROCEDURE — 3288F PR FALLS RISK ASSESSMENT DOCUMENTED: ICD-10-PCS | Mod: CPTII,,, | Performed by: INTERNAL MEDICINE

## 2023-11-06 PROCEDURE — 1159F PR MEDICATION LIST DOCUMENTED IN MEDICAL RECORD: ICD-10-PCS | Mod: CPTII,,, | Performed by: INTERNAL MEDICINE

## 2023-11-06 PROCEDURE — 1160F RVW MEDS BY RX/DR IN RCRD: CPT | Mod: CPTII,,, | Performed by: INTERNAL MEDICINE

## 2023-11-06 PROCEDURE — 3078F DIAST BP <80 MM HG: CPT | Mod: CPTII,,, | Performed by: INTERNAL MEDICINE

## 2023-11-06 PROCEDURE — G0439 PPPS, SUBSEQ VISIT: HCPCS | Mod: ,,, | Performed by: INTERNAL MEDICINE

## 2023-11-06 PROCEDURE — 3288F FALL RISK ASSESSMENT DOCD: CPT | Mod: CPTII,,, | Performed by: INTERNAL MEDICINE

## 2023-11-06 PROCEDURE — 3075F PR MOST RECENT SYSTOLIC BLOOD PRESS GE 130-139MM HG: ICD-10-PCS | Mod: CPTII,,, | Performed by: INTERNAL MEDICINE

## 2023-11-06 PROCEDURE — 1125F AMNT PAIN NOTED PAIN PRSNT: CPT | Mod: CPTII,,, | Performed by: INTERNAL MEDICINE

## 2023-11-06 PROCEDURE — 1124F PR ADV CARE PLAN DISCUSSED, UNABLE/UNWILL DOC PLAN OR SURROGATE: ICD-10-PCS | Mod: CPTII,,, | Performed by: INTERNAL MEDICINE

## 2023-11-06 PROCEDURE — 1101F PT FALLS ASSESS-DOCD LE1/YR: CPT | Mod: CPTII,,, | Performed by: INTERNAL MEDICINE

## 2023-11-06 PROCEDURE — 1159F MED LIST DOCD IN RCRD: CPT | Mod: CPTII,,, | Performed by: INTERNAL MEDICINE

## 2023-11-06 PROCEDURE — 3078F PR MOST RECENT DIASTOLIC BLOOD PRESSURE < 80 MM HG: ICD-10-PCS | Mod: CPTII,,, | Performed by: INTERNAL MEDICINE

## 2023-11-06 PROCEDURE — 3075F SYST BP GE 130 - 139MM HG: CPT | Mod: CPTII,,, | Performed by: INTERNAL MEDICINE

## 2023-11-06 PROCEDURE — G0439 PR MEDICARE ANNUAL WELLNESS SUBSEQUENT VISIT: ICD-10-PCS | Mod: ,,, | Performed by: INTERNAL MEDICINE

## 2023-11-06 PROCEDURE — 1125F PR PAIN SEVERITY QUANTIFIED, PAIN PRESENT: ICD-10-PCS | Mod: CPTII,,, | Performed by: INTERNAL MEDICINE

## 2023-11-06 PROCEDURE — 1160F PR REVIEW ALL MEDS BY PRESCRIBER/CLIN PHARMACIST DOCUMENTED: ICD-10-PCS | Mod: CPTII,,, | Performed by: INTERNAL MEDICINE

## 2023-11-06 NOTE — PROGRESS NOTES
Patient ID: [id number]     Chief Complaint: Medicare AWV      HPI:     Demetrice Barrett is a 78 y.o. female here today for a Medicare Wellness. She hasn't been to other doctors since she was in the hospital. She can't say when it was she was there. She doesn't think she saw heart doctors after that.       Opioid Screening: Patient medication list reviewed, patient is not taking prescription opioids. Patient is not using additional opioids than prescribed. Patient is at low risk of substance abuse based on this opioid use history.       Past Medical History:   Diagnosis Date    Aortic valve sclerosis 7/17/2023    COVID-19 07/08/2020    Diabetes mellitus type II, uncontrolled     HTN (hypertension)     Hypercholesterolemia     Internal hemorrhoids     Obesity     Peripheral vascular disease 7/17/2023    Mild on US 2023    Tubular adenoma of colon         Past Surgical History:   Procedure Laterality Date    COLONOSCOPY N/A 12/04/2020    COLONOSCOPY W/ BIOPSIES AND POLYPECTOMY      TOTAL ABDOMINAL HYSTERECTOMY         Review of patient's allergies indicates:  No Known Allergies    Outpatient Medications Marked as Taking for the 11/6/23 encounter (Office Visit) with Naz Lugo MD   Medication Sig Dispense Refill    alendronate (FOSAMAX) 70 MG tablet TAKE 1 TABLET EVERY WEEK AS DIRECTED;  SEE PACKAGE FOR ADDITIONAL INSTRUCTIONS 12 tablet 1    amLODIPine (NORVASC) 10 MG tablet Take 10 mg by mouth once daily at 6am.      enalapril (VASOTEC) 20 MG tablet TAKE 1 TABLET EVERY DAY 90 tablet 1    metFORMIN (GLUCOPHAGE) 850 MG tablet TAKE 1 TABLET TWICE DAILY 180 tablet 0    rosuvastatin (CRESTOR) 20 MG tablet Take 20 mg by mouth once daily at 6am.      TRUE METRIX GLUCOSE TEST STRIP Strp Inject 1 strip into the skin once daily. For diabetes E11.65 150 strip 3    TRUEPLUS LANCETS 33 gauge Misc TEST BLOOD SUGAR TWICE DAILY 200 each 3       Social History     Socioeconomic History    Marital status:    Tobacco Use     Smoking status: Never    Smokeless tobacco: Never   Substance and Sexual Activity    Alcohol use: Never    Drug use: Never    Sexual activity: Not Currently     Social Determinants of Health     Financial Resource Strain: Low Risk  (6/22/2023)    Overall Financial Resource Strain (CARDIA)     Difficulty of Paying Living Expenses: Not hard at all   Food Insecurity: No Food Insecurity (6/22/2023)    Hunger Vital Sign     Worried About Running Out of Food in the Last Year: Never true     Ran Out of Food in the Last Year: Never true   Transportation Needs: No Transportation Needs (6/22/2023)    PRAPARE - Transportation     Lack of Transportation (Medical): No     Lack of Transportation (Non-Medical): No   Physical Activity: Sufficiently Active (6/22/2023)    Exercise Vital Sign     Days of Exercise per Week: 5 days     Minutes of Exercise per Session: 30 min   Stress: No Stress Concern Present (6/22/2023)    [place] [employer] - Occupational Stress Questionnaire     Feeling of Stress : Only a little   Social Connections: Socially Integrated (6/22/2023)    Social Connection and Isolation Panel [NHANES]     Frequency of Communication with Friends and Family: More than three times a week     Frequency of Social Gatherings with Friends and Family: Three times a week     Attends Je[employer] witness Services: 1 to 4 times per year     Active Member of Clubs or Organizations: Yes     Attends Club or Organization Meetings: 1 to 4 times per year     Marital Status:    Housing Stability: Low Risk  (6/22/2023)    Housing Stability Vital Sign     Unable to Pay for Housing in the Last Year: No     Number of Places Lived in the Last Year: 1     Unstable Housing in the Last Year: No        Family History   Problem Relation Age of Onset    Hyperlipidemia Mother     Hypertension Mother     Kidney disease Mother     Gout Mother     Stroke Sister     Hypertension Sister     Diabetes Sister     Heart disease Sister      Anemia Sister     Kidney disease Sister         Patient Care Team:  Naz Lugo MD as PCP - General (Internal Medicine)  Paty Marcelino MD as Consulting Physician (Neurology)  Love Singh MD as Consulting Physician (Cardiovascular Disease)  #[id number], [facility]       Subjective:     Review of Systems   Constitutional: Negative.    HENT: Negative.     Eyes:         She has not been to eye   Respiratory: Negative.     Cardiovascular:  Negative for leg swelling.   Gastrointestinal: Negative.    Genitourinary: Negative.    Musculoskeletal:  Positive for back pain and myalgias.   Skin:         Right foot is a little darker on toes than left and it worries her   Neurological:         Memory is worse, if she waits a while it will come to her   Endo/Heme/Allergies:  Negative for environmental allergies and polydipsia. Does not bruise/bleed easily.        Sugars are still up and down, she did not bring log.    Psychiatric/Behavioral: Negative.           Patient Reported Health Risk Assessment  What [age] 70-79  Are you male or female?: Female  During the past four weeks, how much have you been bothered by emotional problems such as feeling anxious, depressed, irritable, sad, or downhearted and blue?: Not at all  During the past five weeks, has your physical and/or emotional health limited your social activities with family, friends, neighbors, or groups?: Not at all  During the past four weeks, how much bodily pain have you generally had?: No pain  During the past four weeks, was someone available to help if you needed and wanted help?: No, not at all  During the past four weeks, what was the hardest physical activity you could do for at least two minutes?: Light  Can you get to places out of walking distance without help?  (For example, can you travel alone on buses or taxis, or drive your own car?): Yes  Can you go shopping for groceries or clothes without someone's help?: Yes  Can you prepare  your own meals?: Yes  Can you do your own housework without help?: Yes  Because of any health problems, do you need the help of another person with your personal care needs such as eating, bathing, dressing, or getting around the house?: No  Can you handle your own money without help?: Yes  During the past four weeks, how would you rate your health in general?: Good  How have things been going for you during the past four weeks?: Pretty well  Are you having difficulties driving your car?: No  Do you always fasten your seat belt when you are in a car?: Yes, usually  How often in the past four weeks have you been bothered by falling or dizzy when standing up?: Never  How often in the past four weeks have you been bothered by sexual problems?: Never  How often in the past four weeks have you been bothered by trouble eating well?: Never  How often in the past four weeks have you been bothered by teeth or denture problems?: Never  How often in the past four weeks have you been bothered with problems using the telephone?: Never  How often in the past four weeks have you been bothered by tiredness or fatigue?: Never  Have you fallen two or more times in the past year?: No  Are you afraid of falling?: No  Are you a smoker?: No  During the past four weeks, how many drinks of wine, beer, or other alcoholic beverages did you have?: No alcohol at all  Do you exercise for about 20 minutes three or more days a week?: No, I usually do not exercise this much  Have you been given any information to help you with hazards in your house that might hurt you?: No  Have you been given any information to help you with keeping track of your medications?: No  How often do you have trouble taking medicines the way you've been told to take them?: I always take them as prescribed  How confident are you that you can control and manage most of your health problems?: Somewhat confident  What is your race? (Check all that apply.):   American    Objective:     /70   Pulse 71   Temp 98.4 °F (36.9 °C) (Oral)   Resp 16   Ht 5' (1.524 m)   Wt 75.3 kg (166 lb)   SpO2 98%   BMI 32.42 kg/m²     Physical Exam  Vitals reviewed.   HENT:      Head: Normocephalic and atraumatic.      Right Ear: Tympanic membrane, ear canal and external ear normal.      Left Ear: Tympanic membrane, ear canal and external ear normal.      Mouth/Throat:      Mouth: Mucous membranes are moist.      Pharynx: No oropharyngeal exudate.   Eyes:      General: No scleral icterus.     Extraocular Movements: Extraocular movements intact.      Conjunctiva/sclera: Conjunctivae normal.      Pupils: Pupils are equal, round, and reactive to light.   Neck:      Vascular: No carotid bruit.   Cardiovascular:      Rate and Rhythm: Normal rate and regular rhythm.      Pulses:           Dorsalis pedis pulses are 2+ on the right side and 3+ on the left side.        Posterior tibial pulses are 2+ on the right side and 2+ on the left side.      Heart sounds:      No gallop.   Pulmonary:      Effort: Pulmonary effort is normal.      Breath sounds: Normal breath sounds. No wheezing.   Abdominal:      General: Bowel sounds are normal.      Palpations: Abdomen is soft.      Tenderness: There is no abdominal tenderness. There is no guarding.   Musculoskeletal:      Cervical back: Normal range of motion. No rigidity.      Right lower leg: Edema present.      Left lower leg: Edema present.      Right foot: Deformity present.      Left foot: Deformity present.      Comments: Trace edema   Feet:      Right foot:      Protective Sensation: 10 sites tested.  10 sites sensed.      Skin integrity: Dry skin present.      Toenail Condition: Right toenails are abnormally thick.      Left foot:      Protective Sensation: 10 sites tested.  10 sites sensed.      Skin integrity: Callus and dry skin present.      Toenail Condition: Left toenails are abnormally thick.      Comments: Pes planus bilaterally,  the left ankle is also changed  Lymphadenopathy:      Cervical: No cervical adenopathy.   Neurological:      Mental Status: She is alert and oriented to person, place, and time. Mental status is at baseline.      Sensory: No sensory deficit.      Motor: Weakness present.      Gait: Gait normal.   Psychiatric:         Mood and Affect: Mood normal.         Behavior: Behavior normal.         Thought Content: Thought content normal.         Judgment: Judgment normal.                No data to display                  11/6/2023     2:40 PM 9/25/2023     2:00 PM 7/17/2023     1:20 PM 6/22/2023     2:00 PM 3/13/2023    11:20 AM 12/12/2022    11:20 AM 8/17/2022     2:00 PM   Fall Risk Assessment - Outpatient   Mobility Status Ambulatory Ambulatory Ambulatory Ambulatory  Ambulatory    Number of falls 0 0 0 0 0 0 0   Identified as fall risk False False False False  False False           Office Visit on 09/25/2023   Component Date Value    Hemoglobin A1C, POC 09/25/2023 8.9    Office Visit on 07/17/2023   Component Date Value    Glucose 07/17/2023 65 (L)     BUN 07/17/2023 15     Creatinine 07/17/2023 0.89     eGFR 07/17/2023 66     BUN/Creatinine Ratio 07/17/2023 17     Sodium 07/17/2023 140     Potassium 07/17/2023 3.8     Chloride 07/17/2023 104     CO2 07/17/2023 19 (L)     Calcium 07/17/2023 9.8    Hospital Outpatient Visit on 07/14/2023   Component Date Value    EF 07/14/2023 65     Left Ventricular Outflow* 07/14/2023 0.56     LVIDd 07/14/2023 5.00     IVS 07/14/2023 1.30     Posterior Wall 07/14/2023 1.20     LVIDs 07/14/2023 3.10     FS 07/14/2023 38     LV mass 07/14/2023 247.60     LA size 07/14/2023 3.60     Left Ventricle Relative * 07/14/2023 0.48     AV valve area 07/14/2023 1.85     AV Velocity Ratio 07/14/2023 0.62     AV index (prosthetic) 07/14/2023 0.65     E/A ratio 07/14/2023 0.82     LVOT diameter 07/14/2023 1.90     LVOT area 07/14/2023 2.8     LVOT peak marcelina 07/14/2023 0.8     LVOT peak VTI 07/14/2023  20.20     Ao peak dylan 07/14/2023 1.3     Ao VTI 07/14/2023 31.00     LVOT stroke volume 07/14/2023 57.24     AV peak gradient 07/14/2023 7     MV Peak E Dylan 07/14/2023 0.90     MV Peak A Dylan 07/14/2023 1.10     Right Atrial Pressure (f* 07/14/2023 3    Office Visit on 06/22/2023   Component Date Value    Hemoglobin A1C, POC 06/22/2023 11.5        Depression Screening  Over the past two weeks, has the patient felt down, depressed, or hopeless?: No  Over the past two weeks, has the patient felt little interest or pleasure in doing things?: No  Functional Ability/Safety Screening  Was the patient's timed Up & Go test unsteady or longer than 30 seconds?: No  Does the patient need help with phone, transportation, shopping, preparing meals, housework, laundry, meds, or managing money?: No  Does the patient's home have rugs in the hallway, lack grab bars in the bathroom, lack handrails on the stairs or have poor lighting?: No  Have you noticed any hearing difficulties?: No  Cognitive Function (Assessed through direct observation with due consideration of information obtained by way of patient reports and/or concerns raised by family, friends, caretakers, or others)    Does the patient repeat questions/statements in the same day?: No  Does the patient have trouble remembering the date, year, and time?: No  Does the patient have difficulty managing finances?: No  Does the patient have a decreased sense of direction?: No  Assessment/Plan:     1. Medicare annual wellness visit, subsequent    2. Essential hypertension  Comments:  Good control on Amlodipine and Enalapril  Orders:  -     Comprehensive Metabolic Panel  -     Urinalysis, Reflex to Urine Culture  -     Lipid Panel    3. Hypercholesterolemia  Comments:  Lipid today, she did have coffee with sugar in it  Orders:  -     Lipid Panel    4. Peripheral vascular disease  Comments:  Encouraged her to talk to Dr Singh about the darker areas on her foot  Overview:  Mild on   2023      5. Aortic valve insufficiency, etiology of cardiac valve disease unspecified  Comments:  No symptoms of chest pain or dyspnea.   Overview:  Mild on ECHO 7/14/23      6. Dementia without behavioral disturbance  Comments:  Memory is worse today  Overview:  CBC CMP and TSH in 10/2021 unremarkable apart from mildly elevated LFTs, hepatic function panel in 2/2022 normal, A1C in 01/2022 8    MRI brain 10/2021 unremarkable, mild chronic microangiopathy noted    MOCA scores:  08/2022: 19/30- difficulty with language and recall    Orders:  -     TSH    7. Age-related osteoporosis without current pathological fracture    8. Uncontrolled type 2 diabetes mellitus with hyperglycemia  Comments:  Sounds like control is still not at target  Orders:  -     Comprehensive Metabolic Panel  -     Hemoglobin A1C  -     Lipid Panel  -     Microalbumin/Creatinine Ratio, Urine  -     TSH    9. Congestive heart failure with LV diastolic dysfunction, NYHA class 1  Comments:  Minimal symptoms but will get last note from Samantha  Overview:  ECHO in hospital 10/22 showed grade 1 with 55% EF.     Orders:  -     TSH    10. Tubular adenoma of colon  Comments:  Had colon in 2020 so likely her last if not symptomatic           Medicare Annual Wellness and Personalized Prevention Plan:   Fall Risk + Home Safety + Hearing Impairment + Depression Screen + Opioid and Substance Abuse Screening + Cognitive Impairment Screen + Health Risk Assessment all reviewed.     Health Maintenance Topics with due status: Not Due       Topic Last Completion Date    DEXA Scan 12/27/2022    Hemoglobin A1c 09/25/2023      The patient's Health Maintenance was reviewed and the following appears to be due at this time:   Health Maintenance Due   Topic Date Due    Eye Exam  Never done    RSV Vaccine (Age 60+) (1 - 1-dose 60+ series) Never done    Diabetes Urine Screening  08/17/2023    Influenza Vaccine (1) 09/01/2023    COVID-19 Vaccine (2 - 2023-24 season)  09/01/2023    Lipid Panel  10/13/2023       Advance Care Planning   I attest to discussing Advance Care Planning with patient and/or family member.  Education was provided including the importance of the Health Care Power of , Advance Directives, and/or [place] documentation.  The patient expressed understanding to the importance of this information and discussion.     Advance Care Planning     Date: 11/06/2023  Patient did not wish or was not able to name a surrogate decision maker or provide an Advance Care Plan. She has told her family and they know what she would want.          Follow up in about 3 months (around 2/6/2024) for Uncontrolled Diabetes. In addition to their scheduled follow up, the patient has also been instructed to follow up on as needed basis.

## 2023-11-07 ENCOUNTER — TELEPHONE (OUTPATIENT)
Dept: PRIMARY CARE CLINIC | Facility: CLINIC | Age: 78
End: 2023-11-07
Payer: MEDICARE

## 2023-11-07 LAB
ALBUMIN/CREAT UR: 36 MG/G CREAT (ref 0–29)
APPEARANCE UR: CLEAR
BACTERIA #/AREA URNS HPF: NORMAL /[HPF]
BILIRUB UR QL STRIP: NEGATIVE
COLOR UR: YELLOW
CREAT UR-MCNC: 58.7 MG/DL
CRYSTALS URNS MICRO: NORMAL
EPI CELLS #/AREA URNS HPF: NORMAL /HPF (ref 0–10)
GLUCOSE UR QL STRIP: ABNORMAL
HGB UR QL STRIP: NEGATIVE
KETONES UR QL STRIP: NEGATIVE
LEUKOCYTE ESTERASE UR QL STRIP: NEGATIVE
MICRO URNS: ABNORMAL
MICRO URNS: ABNORMAL
MICROALBUMIN UR-MCNC: 21.3 UG/ML
NITRITE UR QL STRIP: NEGATIVE
PH UR STRIP: 5.5 [PH] (ref 5–7.5)
PROT UR QL STRIP: NEGATIVE
RBC #/AREA URNS HPF: NORMAL /HPF (ref 0–2)
SP GR UR STRIP: 1.02 (ref 1–1.03)
URINALYSIS REFLEX: ABNORMAL
UROBILINOGEN UR STRIP-MCNC: 0.2 MG/DL (ref 0.2–1)
WBC #/AREA URNS HPF: NORMAL /HPF (ref 0–5)

## 2023-11-07 RX ORDER — METFORMIN HYDROCHLORIDE 1000 MG/1
1000 TABLET ORAL 2 TIMES DAILY WITH MEALS
Qty: 180 TABLET | Refills: 1 | Status: SHIPPED | OUTPATIENT
Start: 2023-11-07 | End: 2024-02-28 | Stop reason: SDUPTHER

## 2023-11-07 NOTE — TELEPHONE ENCOUNTER
----- Message from Naz Lugo MD sent at 11/7/2023  5:19 PM CST -----  Sugar and cholesterol are going back up, has she changed her diet? If she's not cheating I can go up to the 1000 on the metformin or I can add back another pill. I think [facility] may have a generic now so it's possible it will be more affordable now.  I show she's past due for eye too.

## 2023-11-07 NOTE — TELEPHONE ENCOUNTER
Result given to patient she is cheating on her diet but ok with going up on metformin.Copy to Dr Singh.

## 2023-11-07 NOTE — TELEPHONE ENCOUNTER
----- Message from Naz Lugo MD sent at 11/7/2023  5:20 PM CST -----  Copy to Samantha, see results below

## 2023-11-10 NOTE — PATIENT INSTRUCTIONS
Caio Suresh,     If you are due for any health screening(s) below please notify me so we can arrange them to be ordered and scheduled. Most healthy patients at your age complete them, but you are free to accept or refuse.     If you can't do it, I'll definitely understand. If you can, I'd certainly appreciate it!    Tests to Keep You Healthy    Eye Exam: DUE  Last Blood Pressure <= 139/89 (11/6/2023): Yes      Lets manage your A1c levels     Your last hemoglobin A1c was higher than the goal of less than 8. Hemoglobin A1c or HbA1c is a blood test that measures your average blood sugar levels over the past 3 months. It is the main test to help you and your health care team manage your diabetes.     Higher A1c levels are linked to diabetes complications, such as loss of vision, kidney disease, and nerve damage. Keeping your A1c at least less than 8 is important to stay healthy and we are here to help. Talk with your provider on how you can further improve your A1c.     We recommend that you set up blood work to get a repeat hemoglobin A1c in 3 months to monitor your diabetes. Let your health care team know if you have questions.    Your diabetic retinal eye exam is due     Diabetes is the #1 cause of blindness in the [place] - early detection before signs or symptoms develop can prevent debilitating blindness.     Our records indicate that you may be overdue for your annual diabetic eye exam. Eye screening can help identify patients at risk for developing vision loss which is common in diabetes. This simple screening is an important step to keeping you healthy and preventing complications from diabetes.     This recommended diabetic eye exam should take place once per year and can prevent and treat diabetes complications in the eye before developing symptoms. This can be done with a special camera is used to take photographs of the back of your eye without having to dilate them, or you can see an eye doctor for a full dilated  exam.     If you recently had your yearly diabetic eye exam performed outside of [facility], please let your Health care team know so that they can update your health record.         Reviwed 5-10 year preventative care plan with patient. Reminded to schedule eye visit. Cognitive impairment and lack of family at visit make retention unlikely.

## 2023-11-23 ENCOUNTER — HOSPITAL ENCOUNTER (EMERGENCY)
Facility: HOSPITAL | Age: 78
Discharge: ANOTHER HOSPITAL | End: 2023-11-24
Attending: STUDENT IN AN ORGANIZED HEALTH CARE EDUCATION/TRAINING PROGRAM
Payer: MEDICARE

## 2023-11-23 DIAGNOSIS — D72.829 LEUKOCYTOSIS, UNSPECIFIED TYPE: ICD-10-CM

## 2023-11-23 DIAGNOSIS — R00.0 TACHYCARDIA: ICD-10-CM

## 2023-11-23 DIAGNOSIS — R41.82 AMS (ALTERED MENTAL STATUS): ICD-10-CM

## 2023-11-23 DIAGNOSIS — R73.9 HYPERGLYCEMIA: ICD-10-CM

## 2023-11-23 DIAGNOSIS — R41.82 ALTERED MENTAL STATUS, UNSPECIFIED ALTERED MENTAL STATUS TYPE: Primary | ICD-10-CM

## 2023-11-23 DIAGNOSIS — R41.0 DELIRIUM: ICD-10-CM

## 2023-11-23 LAB
ALBUMIN SERPL-MCNC: 4.3 G/DL (ref 3.4–4.8)
ALBUMIN/GLOB SERPL: 1.1 RATIO (ref 1.1–2)
ALP SERPL-CCNC: 78 UNIT/L (ref 40–150)
ALT SERPL-CCNC: 27 UNIT/L (ref 0–55)
AMMONIA PLAS-MSCNC: 23.1 UMOL/L (ref 18–72)
AST SERPL-CCNC: 17 UNIT/L (ref 5–34)
BASOPHILS # BLD AUTO: 0.01 X10(3)/MCL
BASOPHILS NFR BLD AUTO: 0.1 %
BILIRUB SERPL-MCNC: 0.4 MG/DL
BUN SERPL-MCNC: 14.7 MG/DL (ref 9.8–20.1)
CALCIUM SERPL-MCNC: 9.4 MG/DL (ref 8.4–10.2)
CHLORIDE SERPL-SCNC: 98 MMOL/L (ref 98–107)
CO2 SERPL-SCNC: 23 MMOL/L (ref 23–31)
CREAT SERPL-MCNC: 1.1 MG/DL (ref 0.55–1.02)
EOSINOPHIL # BLD AUTO: 0 X10(3)/MCL (ref 0–0.9)
EOSINOPHIL NFR BLD AUTO: 0 %
ERYTHROCYTE [DISTWIDTH] IN BLOOD BY AUTOMATED COUNT: 12.9 % (ref 11.5–17)
ETHANOL SERPL-MCNC: <10 MG/DL
GFR SERPLBLD CREATININE-BSD FMLA CKD-EPI: 52 MLS/MIN/1.73/M2
GLOBULIN SER-MCNC: 3.9 GM/DL (ref 2.4–3.5)
GLUCOSE SERPL-MCNC: 294 MG/DL (ref 82–115)
HCT VFR BLD AUTO: 42 % (ref 37–47)
HGB BLD-MCNC: 13.3 G/DL (ref 12–16)
IMM GRANULOCYTES # BLD AUTO: 0.01 X10(3)/MCL (ref 0–0.04)
IMM GRANULOCYTES NFR BLD AUTO: 0.1 %
LACTATE SERPL-SCNC: 2.2 MMOL/L (ref 0.5–2.2)
LYMPHOCYTES # BLD AUTO: 1.33 X10(3)/MCL (ref 0.6–4.6)
LYMPHOCYTES NFR BLD AUTO: 11 %
MAGNESIUM SERPL-MCNC: 1.5 MG/DL (ref 1.6–2.6)
MCH RBC QN AUTO: 28.4 PG (ref 27–31)
MCHC RBC AUTO-ENTMCNC: 31.7 G/DL (ref 33–36)
MCV RBC AUTO: 89.7 FL (ref 80–94)
MONOCYTES # BLD AUTO: 0.35 X10(3)/MCL (ref 0.1–1.3)
MONOCYTES NFR BLD AUTO: 2.9 %
NEUTROPHILS # BLD AUTO: 10.44 X10(3)/MCL (ref 2.1–9.2)
NEUTROPHILS NFR BLD AUTO: 85.9 %
PHOSPHATE SERPL-MCNC: 2.2 MG/DL (ref 2.3–4.7)
PLATELET # BLD AUTO: 310 X10(3)/MCL (ref 130–400)
PMV BLD AUTO: 11.2 FL (ref 7.4–10.4)
POC CARDIAC TROPONIN I: 0.01 NG/ML (ref 0–0.08)
POCT GLUCOSE: 250 MG/DL (ref 70–110)
POTASSIUM SERPL-SCNC: 3.7 MMOL/L (ref 3.5–5.1)
PROT SERPL-MCNC: 8.2 GM/DL (ref 5.8–7.6)
RBC # BLD AUTO: 4.68 X10(6)/MCL (ref 4.2–5.4)
SAMPLE: NORMAL
SODIUM SERPL-SCNC: 133 MMOL/L (ref 136–145)
WBC # SPEC AUTO: 12.14 X10(3)/MCL (ref 4.5–11.5)

## 2023-11-23 PROCEDURE — 85025 COMPLETE CBC W/AUTO DIFF WBC: CPT | Performed by: STUDENT IN AN ORGANIZED HEALTH CARE EDUCATION/TRAINING PROGRAM

## 2023-11-23 PROCEDURE — 96366 THER/PROPH/DIAG IV INF ADDON: CPT

## 2023-11-23 PROCEDURE — 83735 ASSAY OF MAGNESIUM: CPT | Performed by: STUDENT IN AN ORGANIZED HEALTH CARE EDUCATION/TRAINING PROGRAM

## 2023-11-23 PROCEDURE — 93005 ELECTROCARDIOGRAM TRACING: CPT

## 2023-11-23 PROCEDURE — 82962 GLUCOSE BLOOD TEST: CPT

## 2023-11-23 PROCEDURE — 83605 ASSAY OF LACTIC ACID: CPT | Performed by: STUDENT IN AN ORGANIZED HEALTH CARE EDUCATION/TRAINING PROGRAM

## 2023-11-23 PROCEDURE — 93010 ELECTROCARDIOGRAM REPORT: CPT | Mod: ,,, | Performed by: INTERNAL MEDICINE

## 2023-11-23 PROCEDURE — 99285 EMERGENCY DEPT VISIT HI MDM: CPT | Mod: 25

## 2023-11-23 PROCEDURE — 82077 ASSAY SPEC XCP UR&BREATH IA: CPT | Performed by: STUDENT IN AN ORGANIZED HEALTH CARE EDUCATION/TRAINING PROGRAM

## 2023-11-23 PROCEDURE — 80053 COMPREHEN METABOLIC PANEL: CPT | Performed by: STUDENT IN AN ORGANIZED HEALTH CARE EDUCATION/TRAINING PROGRAM

## 2023-11-23 PROCEDURE — 96365 THER/PROPH/DIAG IV INF INIT: CPT

## 2023-11-23 PROCEDURE — 96361 HYDRATE IV INFUSION ADD-ON: CPT

## 2023-11-23 PROCEDURE — 96375 TX/PRO/DX INJ NEW DRUG ADDON: CPT

## 2023-11-23 PROCEDURE — 82140 ASSAY OF AMMONIA: CPT | Performed by: STUDENT IN AN ORGANIZED HEALTH CARE EDUCATION/TRAINING PROGRAM

## 2023-11-23 PROCEDURE — 84100 ASSAY OF PHOSPHORUS: CPT | Performed by: STUDENT IN AN ORGANIZED HEALTH CARE EDUCATION/TRAINING PROGRAM

## 2023-11-23 PROCEDURE — 93010 EKG 12-LEAD: ICD-10-PCS | Mod: ,,, | Performed by: INTERNAL MEDICINE

## 2023-11-24 ENCOUNTER — HOSPITAL ENCOUNTER (INPATIENT)
Facility: HOSPITAL | Age: 78
LOS: 3 days | Discharge: HOME | DRG: 637 | End: 2023-11-27
Attending: INTERNAL MEDICINE | Admitting: INTERNAL MEDICINE
Payer: MEDICARE

## 2023-11-24 VITALS
DIASTOLIC BLOOD PRESSURE: 73 MMHG | RESPIRATION RATE: 18 BRPM | HEIGHT: 60 IN | HEART RATE: 73 BPM | BODY MASS INDEX: 29.45 KG/M2 | OXYGEN SATURATION: 98 % | WEIGHT: 150 LBS | TEMPERATURE: 99 F | SYSTOLIC BLOOD PRESSURE: 135 MMHG

## 2023-11-24 DIAGNOSIS — R41.82 AMS (ALTERED MENTAL STATUS): ICD-10-CM

## 2023-11-24 DIAGNOSIS — R07.9 CHEST PAIN: ICD-10-CM

## 2023-11-24 LAB
AMPHET UR QL SCN: NEGATIVE
APPEARANCE UR: CLEAR
BACTERIA #/AREA URNS AUTO: NORMAL /HPF
BARBITURATE SCN PRESENT UR: NEGATIVE
BENZODIAZ UR QL SCN: NEGATIVE
BILIRUB UR QL STRIP.AUTO: NEGATIVE
CANNABINOIDS UR QL SCN: NEGATIVE
COCAINE UR QL SCN: NEGATIVE
COLOR UR AUTO: YELLOW
FLUAV AG UPPER RESP QL IA.RAPID: NOT DETECTED
FLUBV AG UPPER RESP QL IA.RAPID: NOT DETECTED
GLUCOSE UR QL STRIP.AUTO: 500
KETONES UR QL STRIP.AUTO: ABNORMAL
LACTATE SERPL-SCNC: 2.2 MMOL/L (ref 0.5–2.2)
LEUKOCYTE ESTERASE UR QL STRIP.AUTO: ABNORMAL
NITRITE UR QL STRIP.AUTO: NEGATIVE
OPIATES UR QL SCN: NEGATIVE
PCP UR QL: NEGATIVE
PH UR STRIP.AUTO: 7.5 [PH]
PH UR: 7.5 [PH] (ref 3–11)
POCT GLUCOSE: 188 MG/DL (ref 70–110)
POCT GLUCOSE: 189 MG/DL (ref 70–110)
POCT GLUCOSE: 192 MG/DL (ref 70–110)
POCT GLUCOSE: 213 MG/DL (ref 70–110)
POCT GLUCOSE: 217 MG/DL (ref 70–110)
POCT GLUCOSE: 229 MG/DL (ref 70–110)
PROT UR QL STRIP.AUTO: 100
RBC #/AREA URNS AUTO: NORMAL /HPF
RBC UR QL AUTO: ABNORMAL
SARS-COV-2 RNA RESP QL NAA+PROBE: NOT DETECTED
SP GR UR STRIP.AUTO: 1.02 (ref 1–1.03)
SPECIFIC GRAVITY, URINE AUTO (.000) (OHS): 1.02 (ref 1–1.03)
SQUAMOUS #/AREA URNS AUTO: NORMAL /HPF
UROBILINOGEN UR STRIP-ACNC: 0.2
WBC #/AREA URNS AUTO: NORMAL /HPF

## 2023-11-24 PROCEDURE — 25500020 PHARM REV CODE 255: Performed by: STUDENT IN AN ORGANIZED HEALTH CARE EDUCATION/TRAINING PROGRAM

## 2023-11-24 PROCEDURE — 93005 ELECTROCARDIOGRAM TRACING: CPT

## 2023-11-24 PROCEDURE — 63600175 PHARM REV CODE 636 W HCPCS: Performed by: NURSE PRACTITIONER

## 2023-11-24 PROCEDURE — 93010 ELECTROCARDIOGRAM REPORT: CPT | Mod: ,,, | Performed by: INTERNAL MEDICINE

## 2023-11-24 PROCEDURE — 80307 DRUG TEST PRSMV CHEM ANLYZR: CPT | Performed by: STUDENT IN AN ORGANIZED HEALTH CARE EDUCATION/TRAINING PROGRAM

## 2023-11-24 PROCEDURE — 63600175 PHARM REV CODE 636 W HCPCS: Performed by: STUDENT IN AN ORGANIZED HEALTH CARE EDUCATION/TRAINING PROGRAM

## 2023-11-24 PROCEDURE — 21400001 HC TELEMETRY ROOM

## 2023-11-24 PROCEDURE — 25000003 PHARM REV CODE 250: Performed by: NURSE PRACTITIONER

## 2023-11-24 PROCEDURE — 11000001 HC ACUTE MED/SURG PRIVATE ROOM

## 2023-11-24 PROCEDURE — 93010 EKG 12-LEAD: ICD-10-PCS | Mod: ,,, | Performed by: INTERNAL MEDICINE

## 2023-11-24 PROCEDURE — 83605 ASSAY OF LACTIC ACID: CPT | Performed by: STUDENT IN AN ORGANIZED HEALTH CARE EDUCATION/TRAINING PROGRAM

## 2023-11-24 PROCEDURE — 25000003 PHARM REV CODE 250: Performed by: STUDENT IN AN ORGANIZED HEALTH CARE EDUCATION/TRAINING PROGRAM

## 2023-11-24 PROCEDURE — 0240U COVID/FLU A&B PCR: CPT | Performed by: STUDENT IN AN ORGANIZED HEALTH CARE EDUCATION/TRAINING PROGRAM

## 2023-11-24 PROCEDURE — 81001 URINALYSIS AUTO W/SCOPE: CPT | Mod: XB | Performed by: STUDENT IN AN ORGANIZED HEALTH CARE EDUCATION/TRAINING PROGRAM

## 2023-11-24 RX ORDER — ENOXAPARIN SODIUM 100 MG/ML
40 INJECTION SUBCUTANEOUS EVERY 24 HOURS
Status: DISCONTINUED | OUTPATIENT
Start: 2023-11-24 | End: 2023-11-27 | Stop reason: HOSPADM

## 2023-11-24 RX ORDER — SODIUM CHLORIDE 9 MG/ML
1000 INJECTION, SOLUTION INTRAVENOUS
Status: COMPLETED | OUTPATIENT
Start: 2023-11-24 | End: 2023-11-24

## 2023-11-24 RX ORDER — ACETAMINOPHEN 325 MG/1
650 TABLET ORAL EVERY 4 HOURS PRN
Status: DISCONTINUED | OUTPATIENT
Start: 2023-11-24 | End: 2023-11-27 | Stop reason: HOSPADM

## 2023-11-24 RX ORDER — ONDANSETRON 2 MG/ML
4 INJECTION INTRAMUSCULAR; INTRAVENOUS EVERY 4 HOURS PRN
Status: DISCONTINUED | OUTPATIENT
Start: 2023-11-24 | End: 2023-11-27 | Stop reason: HOSPADM

## 2023-11-24 RX ORDER — MAGNESIUM SULFATE HEPTAHYDRATE 40 MG/ML
2 INJECTION, SOLUTION INTRAVENOUS
Status: COMPLETED | OUTPATIENT
Start: 2023-11-24 | End: 2023-11-24

## 2023-11-24 RX ORDER — SODIUM CHLORIDE, SODIUM LACTATE, POTASSIUM CHLORIDE, CALCIUM CHLORIDE 600; 310; 30; 20 MG/100ML; MG/100ML; MG/100ML; MG/100ML
INJECTION, SOLUTION INTRAVENOUS CONTINUOUS
Status: DISCONTINUED | OUTPATIENT
Start: 2023-11-24 | End: 2023-11-27 | Stop reason: HOSPADM

## 2023-11-24 RX ORDER — PROCHLORPERAZINE EDISYLATE 5 MG/ML
5 INJECTION INTRAMUSCULAR; INTRAVENOUS EVERY 6 HOURS PRN
Status: DISCONTINUED | OUTPATIENT
Start: 2023-11-24 | End: 2023-11-27 | Stop reason: HOSPADM

## 2023-11-24 RX ORDER — HYDRALAZINE HYDROCHLORIDE 20 MG/ML
10 INJECTION INTRAMUSCULAR; INTRAVENOUS EVERY 4 HOURS PRN
Status: DISCONTINUED | OUTPATIENT
Start: 2023-11-24 | End: 2023-11-27 | Stop reason: HOSPADM

## 2023-11-24 RX ORDER — SODIUM CHLORIDE 0.9 % (FLUSH) 0.9 %
10 SYRINGE (ML) INJECTION
Status: DISCONTINUED | OUTPATIENT
Start: 2023-11-24 | End: 2023-11-27 | Stop reason: HOSPADM

## 2023-11-24 RX ORDER — INSULIN ASPART 100 [IU]/ML
0-5 INJECTION, SOLUTION INTRAVENOUS; SUBCUTANEOUS
Status: DISCONTINUED | OUTPATIENT
Start: 2023-11-24 | End: 2023-11-27 | Stop reason: HOSPADM

## 2023-11-24 RX ORDER — LABETALOL HYDROCHLORIDE 5 MG/ML
10 INJECTION, SOLUTION INTRAVENOUS
Status: DISCONTINUED | OUTPATIENT
Start: 2023-11-24 | End: 2023-11-27 | Stop reason: HOSPADM

## 2023-11-24 RX ORDER — ACETAMINOPHEN 500 MG
1000 TABLET ORAL EVERY 6 HOURS PRN
Status: DISCONTINUED | OUTPATIENT
Start: 2023-11-24 | End: 2023-11-27 | Stop reason: HOSPADM

## 2023-11-24 RX ORDER — NALOXONE HCL 0.4 MG/ML
0.02 VIAL (ML) INJECTION
Status: DISCONTINUED | OUTPATIENT
Start: 2023-11-24 | End: 2023-11-27 | Stop reason: HOSPADM

## 2023-11-24 RX ADMIN — SODIUM CHLORIDE 1000 ML: 9 INJECTION, SOLUTION INTRAVENOUS at 12:11

## 2023-11-24 RX ADMIN — SODIUM CHLORIDE, POTASSIUM CHLORIDE, SODIUM LACTATE AND CALCIUM CHLORIDE: 600; 310; 30; 20 INJECTION, SOLUTION INTRAVENOUS at 05:11

## 2023-11-24 RX ADMIN — ENOXAPARIN SODIUM 40 MG: 40 INJECTION SUBCUTANEOUS at 05:11

## 2023-11-24 RX ADMIN — SODIUM CHLORIDE 1000 ML: 9 INJECTION, SOLUTION INTRAVENOUS at 05:11

## 2023-11-24 RX ADMIN — INSULIN HUMAN 5 UNITS: 100 INJECTION, SOLUTION PARENTERAL at 04:11

## 2023-11-24 RX ADMIN — MAGNESIUM SULFATE IN WATER 2 G: 40 INJECTION, SOLUTION INTRAVENOUS at 01:11

## 2023-11-24 RX ADMIN — ACETAMINOPHEN 325MG 650 MG: 325 TABLET ORAL at 10:11

## 2023-11-24 RX ADMIN — HYDRALAZINE HYDROCHLORIDE 10 MG: 20 INJECTION, SOLUTION INTRAMUSCULAR; INTRAVENOUS at 09:11

## 2023-11-24 RX ADMIN — ACETAMINOPHEN 1000 MG: 500 TABLET ORAL at 05:11

## 2023-11-24 RX ADMIN — IOPAMIDOL 100 ML: 755 INJECTION, SOLUTION INTRAVENOUS at 03:11

## 2023-11-24 RX ADMIN — INSULIN ASPART 2 UNITS: 100 INJECTION, SOLUTION INTRAVENOUS; SUBCUTANEOUS at 04:11

## 2023-11-24 NOTE — ED NOTES
Pt resting - resps even /unlabored-  ambulated earlier to restroom w/o assistance to urinate.  Voices no c/o  gcs 15- lunch tray  ordered.  Still awaits bed assignment at  [facility]- pt has been accepted.

## 2023-11-24 NOTE — NURSING
Nurses Note -- 4 Eyes      11/24/2023   3:40 PM      Skin assessed during: Admit      [x] No Altered Skin Integrity Present    [x]Prevention Measures Documented      [] Yes- Altered Skin Integrity Present or Discovered   [] LDA Added if Not in Epic (Describe Wound)   [] New Altered Skin Integrity was Present on Admit and Documented in LDA   [] Wound Image Taken    Wound Care Consulted? No    Attending Nurse:  NIYAH Caban    Second RN/Staff Member:   NIYAH Brennan

## 2023-11-24 NOTE — ED PROVIDER NOTES
Encounter Date: 11/23/2023       History     Chief Complaint   Patient presents with    Altered Mental Status     Patient brought in by family with concerns for elevated glucose. States around 8pm she became lethargic, her CBG was in the 300s, gave metformin and CBG was 280 at 10pm, but states her cognition stated the same. Arouses but needs firm direction. Extremities equal movement.      Patient is a 78-year-old  female with a history of hypertension, hyperlipidemia, non-insulin-dependent diabetes who presented to the ER today due to altered mental status for the last 5 hours.  Daughter presents her to the ER via POV.  Daughter states that her mother complained of a headache with no obvious neurological changes about 5 hours prior to arrival.  At that time her daughter checked her fingerstick blood glucose level noted to be 300 thus they gave her metformin.  She states that during that time her sugar did improve but she remained altered.  She states she found her laying on the cabinetry in their home.  They presented to the ER as a result.  Upon arrival patient is altered and unable to answer questions.  Patient does not appear to be lateralizing in any particular fashion.  She does not appear to have nuchal rigidity.  Daughter states she is not had a cough complained of anything other than headache and has not had a fever.  Daughter states she is DNR/DNI.      Review of patient's allergies indicates:  No Known Allergies  Past Medical History:   Diagnosis Date    Aortic valve sclerosis 7/17/2023    COVID-19 07/08/2020    Diabetes mellitus type II, uncontrolled     HTN (hypertension)     Hypercholesterolemia     Internal hemorrhoids     Obesity     Peripheral vascular disease 7/17/2023    Mild on US 2023    Tubular adenoma of colon      Past Surgical History:   Procedure Laterality Date    COLONOSCOPY N/A 12/04/2020    COLONOSCOPY W/ BIOPSIES AND POLYPECTOMY      TOTAL ABDOMINAL HYSTERECTOMY        Family History   Problem Relation Age of Onset    Hyperlipidemia Mother     Hypertension Mother     Kidney disease Mother     Gout Mother     Stroke Sister     Hypertension Sister     Diabetes Sister     Heart disease Sister     Anemia Sister     Kidney disease Sister      Social History     Tobacco Use    Smoking status: Never    Smokeless tobacco: Never   Substance Use Topics    Alcohol use: Never    Drug use: Never     Review of Systems   Unable to perform ROS: Mental status change       Physical Exam     Initial Vitals [23 2310]   BP Pulse Resp Temp SpO2   (!) 151/83 91 18 98.2 °F (36.8 °C) 97 %      MAP       --         Physical Exam    Nursing note and vitals reviewed.  Constitutional: She appears well-developed and well-nourished. She is not diaphoretic. No distress.   HENT:   Head: Normocephalic and atraumatic.   Right Ear: External ear normal.   Left Ear: External ear normal.   Nose: Nose normal.   Eyes: Conjunctivae and EOM are normal. Pupils are equal, round, and reactive to light. Right eye exhibits no discharge. Left eye exhibits no discharge. No scleral icterus.   Neck:   No obvious nuchal rigidity on exam.   Normal range of motion.  Cardiovascular:  Normal rate, regular rhythm and normal heart sounds.     Exam reveals no gallop and no friction rub.       No murmur heard.  Pulmonary/Chest: Breath sounds normal. No stridor. No respiratory distress. She has no wheezes. She has no rhonchi. She has no rales.   Abdominal: Abdomen is soft. She exhibits no distension. There is no abdominal tenderness.   Well-healed lower abdominal midline incision consistent with a  section. There is no rebound and no guarding.   Musculoskeletal:         General: Normal range of motion.      Cervical back: Normal range of motion.     Neurological:   GCS8   Skin: Skin is warm. No rash noted. No erythema.   Psychiatric: She has a normal mood and affect. Her behavior is normal.         ED Course    Procedures  Labs Reviewed   COMPREHENSIVE METABOLIC PANEL - Abnormal; Notable for the following components:       Result Value    Sodium Level 133 (*)     Glucose Level 294 (*)     Creatinine 1.10 (*)     Protein Total 8.2 (*)     Globulin 3.9 (*)     All other components within normal limits   MAGNESIUM - Abnormal; Notable for the following components:    Magnesium Level 1.50 (*)     All other components within normal limits   PHOSPHORUS - Abnormal; Notable for the following components:    Phosphorus Level 2.2 (*)     All other components within normal limits   URINALYSIS, REFLEX TO URINE CULTURE - Abnormal; Notable for the following components:    Protein,  (*)     Glucose,  (*)     Ketones, UA Trace (*)     Blood, UA Trace-Intact (*)     Leukocyte Esterase, UA Trace (*)     All other components within normal limits   CBC WITH DIFFERENTIAL - Abnormal; Notable for the following components:    WBC 12.14 (*)     MCHC 31.7 (*)     MPV 11.2 (*)     Neut # 10.44 (*)     All other components within normal limits   POCT GLUCOSE - Abnormal; Notable for the following components:    POCT Glucose 250 (*)     All other components within normal limits   POCT GLUCOSE - Abnormal; Notable for the following components:    POCT Glucose 229 (*)     All other components within normal limits   POCT GLUCOSE - Abnormal; Notable for the following components:    POCT Glucose 213 (*)     All other components within normal limits   POCT GLUCOSE - Abnormal; Notable for the following components:    POCT Glucose 188 (*)     All other components within normal limits   POCT GLUCOSE - Abnormal; Notable for the following components:    POCT Glucose 189 (*)     All other components within normal limits   POCT GLUCOSE - Abnormal; Notable for the following components:    POCT Glucose 192 (*)     All other components within normal limits   POCT GLUCOSE - Abnormal; Notable for the following components:    POCT Glucose 217 (*)     All other  components within normal limits   COVID/FLU A&B PCR - Normal    Narrative:     The Xpert Xpress SARS-CoV-2/FLU/RSV plus is a rapid, multiplexed real-time PCR test intended for the simultaneous qualitative detection and differentiation of SARS-CoV-2, Influenza A, Influenza B, and respiratory syncytial virus (RSV) viral RNA in either nasopharyngeal swab or nasal swab specimens.         LACTIC ACID, PLASMA - Normal   DRUG SCREEN, URINE (BEAKER) - Normal    Narrative:     Cut off concentrations:    Amphetamines - 1000 ng/ml  Barbiturates - 200 ng/ml  Benzodiazepine - 200 ng/ml  Cannabinoids (THC) - 50 ng/ml  Cocaine - 300 ng/ml  Fentanyl - 1.0 ng/ml  MDMA - 500 ng/ml  Opiates - 300 ng/ml   Phencyclidine (PCP) - 25 ng/ml    Specimen submitted for drug analysis and tested for pH and specific gravity in order to evaluate sample integrity. Suspect tampering if specific gravity is <1.003 and/or pH is not within the range of 4.5 - 8.0  False negatives may result form substances such as bleach added to urine.  False positives may result for the presence of a substance with similar chemical structure to the drug or its metabolite.    This test provides only a PRELIMINARY analytical test result. A more specific alternate chemical method must be used in order to obtain a confirmed analytical result. Gas chromatography/mass spectrometry (GC/MS) is the preferred confirmatory method. Other chemical confirmation methods are available. Clinical consideration and professional judgement should be applied to any drug of abuse test result, particularly when preliminary positive results are used.    Positive results will be confirmed only at the physicians request. Unconfirmed screening results are to be used only for medical purposes (treatment).        ALCOHOL,MEDICAL (ETHANOL) - Normal   AMMONIA - Normal   LACTIC ACID, PLASMA - Normal   URINALYSIS, MICROSCOPIC - Normal   CBC W/ AUTO DIFFERENTIAL    Narrative:     The following orders  were created for panel order CBC Auto Differential.  Procedure                               Abnormality         Status                     ---------                               -----------         ------                     CBC with Differential[[id number]]       Abnormal            Final result                 Please view results for these tests on the individual orders.   TROPONIN ISTAT   POCT GLUCOSE, HAND-HELD DEVICE   POCT GLUCOSE, HAND-HELD DEVICE   POCT TROPONIN     EKG Readings: (Independently Interpreted)   Initial Reading: No STEMI. Rhythm: Normal Sinus Rhythm. Heart Rate: 90. Ectopy: No Ectopy. Conduction: Normal. ST Segments: Normal ST Segments. T Waves: Normal. Axis: Normal.       Imaging Results              CT Abdomen Pelvis With IV Contrast NO Oral Contrast (Preliminary result)  Result time 11/24/23 04:07:55      Preliminary result by Clinton Eddy Jr., MD (11/24/23 04:07:55)                   Narrative:    START OF REPORT:  Technique: CT of the abdomen and pelvis was performed with axial images as well as sagittal and coronal reconstruction images with intravenous contrast.    Comparison: None available.    Clinical History: Altered Mental Status (Patient brought in by family with concerns for elevated glucose. States around 8pm she became lethargic, her CBG was in the 300s, gave metformin and CBG was 280 at 10pm, but states her cognition stated the same. Arouses but needs firm direction. Extremities equal movement. ).    Dosage Information: Automated Exposure Control was utilized.    Findings:  Lines and Tubes: None.  Thorax:  Lungs: There is mild nonspecific dependent change at the lung bases. No focal infiltrate or consolidation is seen.  Pleura: No effusions or thickening.  Heart: The heart size is within normal limits.  Abdomen:  Abdominal Wall: No abdominal wall pathology is seen.  Liver: The liver appears unremarkable.  Biliary System: No intrahepatic or extrahepatic biliary duct  dilatation is seen.  Gallbladder: The gallbladder appears unremarkable.  Pancreas: The pancreas appears unremarkable.  Spleen: The spleen appears unremarkable.  Adrenals: The adrenal glands appear unremarkable.  Kidneys: The right kidney appears unremarkable with no stones cysts masses or hydronephrosis. Multiple cysts are identified in the left kidney the largest of which measures 7.0 mm is on Image 29, Series 3 in the upper pole of the left kidney. The left kidney otherwise appears unremarkable with no stones masses or hydronephrosis identified.  Aorta: Scattered calcification of the abdominal aorta and its branches.  IVC: Unremarkable.  Bowel:  Esophagus: The visualized esophagus appears unremarkable.  Stomach: The stomach is nondistended otherwise appears unremarkable.  Duodenum: Unremarkable appearing duodenum.  Small Bowel: The small bowel appears unremarkable.  Colon: Nondistended. There is moderate stool in the ascending transverse and sigmoid colon which could reflect an element of constipation.  Appendix: The appendix is not identified but no inflammatory changes are seen in the right lower quadrant to suggest appendicitis.  Peritoneum: No intraperitoneal free air or ascites is seen.    Pelvis:  Bladder: The bladder appears unremarkable.  Female:  Uterus: The uterus appears unremarkable.  Ovaries: The ovaries are not identified.    Bony structures:  Dorsal Spine: The visualized dorsal spine appears unremarkable.  Bony Pelvis: The visualized bony structures of the pelvis appear unremarkable.      Impression:  1. No acute intraabdominal or pelvic solid organ or bowel pathology identified. Details and other findings as discussed above.                                         CT Head Without Contrast (Final result)  Result time 11/24/23 06:47:31      Final result by Toño Shaw MD (11/24/23 06:47:31)                   Impression:      No acute intracranial abnormality identified.  Findings of chronic  microvascular ischemic disease.      Electronically signed by: Toño Shaw  Date:    11/24/2023  Time:    06:47               Narrative:    EXAMINATION:  CT HEAD WITHOUT CONTRAST    CLINICAL HISTORY:  Mental status change, unknown cause;    TECHNIQUE:  Low dose axial images were obtained through the head.  Coronal and sagittal reformations were also performed. Contrast was not administered.    Automatic exposure control was utilized to reduce the patient's radiation dose.    DLP= 2210    COMPARISON:  10/13/2022    FINDINGS:  No acute intracranial hemorrhage, edema or mass. No acute parenchymal abnormality.    Mild cerebral atrophy with concordant ventricular enlargement.    Scattered hypodensities throughout the deep periventricular white matter.    The osseous structures are normal.    The mastoid air cells are clear.    The auditory canals are patent bilaterally.    The globes and orbital contents are normal bilaterally.    The visualized maxillary, ethmoid and sphenoid sinuses are clear.                        Preliminary result by Clinton Eddy Jr., MD (11/24/23 00:11:14)                   Impression:    1. No acute intracranial process identified. Details and other findings as noted above.               Narrative:    START OF REPORT:  Technique: CT of the head was performed without intravenous contrast with axial as well as coronal and sagittal images.    Comparison: Comparison is with study dated 2022-10-13 14:13:09.    Dosage Information: Automated exposure control was utilized.    Clinical history: Altered Mental Status (Patient brought in by family with concerns for elevated glucose. States around 8pm she became lethargic, her CBG was in the 300s, gave metformin and CBG was 280 at 10pm, but states her cognition stated the same. Arouses but needs firm direction. Extremities equal movement. ).    Findings:  Hemorrhage: No acute intracranial hemorrhage is seen.  CSF spaces: The ventricles, sulci and  basal cisterns all appear mildly prominent consistent with global cerebral atrophy.  Brain parenchyma: There is preservation of the grey white junction throughout. Mild microvascular change is seen in portions of the periventricular and deep white matter tracts.  Cerebellum: Unremarkable.  Vascular: Moderate atheromatous calcification of the intracranial arteries is seen.  Sella and skull base: The sella appears to be within normal limits for age.  Herniation: None.  Intracranial calcifications: Incidental note is made of bilateral choroid plexus calcification. Incidental note is made of some pineal region calcification. Incidental note is made of some calcification of the falx.  Calvarium: No acute linear or depressed skull fracture is seen.    Maxillofacial Structures:  Paranasal sinuses: The visualized paranasal sinuses appear clear with no mucoperiosteal thickening or air fluid levels identified.  Orbits: The orbits appear unremarkable.  Zygomatic arches: The zygomatic arches are intact and unremarkable.  Temporal bones and mastoids: The temporal bones and mastoids appear unremarkable.  TMJ: The mandibular condyles appear normally placed with respect to the mandibular fossa.                                         X-Ray Chest 1 View (Final result)  Result time 11/24/23 06:28:13      Final result by Toño Shaw MD (11/24/23 06:28:13)                   Impression:      No acute cardiopulmonary process.      Electronically signed by: Toño Shaw  Date:    11/24/2023  Time:    06:28               Narrative:    EXAMINATION:  XR CHEST 1 VIEW    CLINICAL HISTORY:  AMS;    TECHNIQUE:  Single view of the chest    COMPARISON:  06/11/2012    FINDINGS:  No focal opacification, pleural effusion, or pneumothorax.    The cardiomediastinal silhouette is within normal limits.    No acute osseous abnormality.                                    X-Rays:   Independently Interpreted Readings:   Chest X-Ray: Normal heart  size.  No infiltrates.  No acute abnormalities.     Medications   magnesium sulfate 2g in water 50mL IVPB (premix) (0 g Intravenous Stopped 11/24/23 0300)   0.9%  NaCl infusion (0 mLs Intravenous Stopped 11/24/23 0718)   insulin regular injection 5 Units 0.05 mL (5 Units Intravenous Given 11/24/23 0428)   iopamidoL (ISOVUE-370) injection 100 mL (100 mLs Intravenous Given 11/24/23 0336)   sodium chloride 0.9% bolus 1,000 mL 1,000 mL (0 mLs Intravenous Stopped 11/24/23 0629)     Medical Decision Making  Differentials: CVA/TIA, meningitis, metabolic encephalopathy, electrolyte disturbance  78-year-old female presents with a GCS of a but verbalize by daughter that patient has a living will stating she is DNR DNI.  Decision made not to intubate was based on this discussion.  Patient had stable vital signs but was unable to verbalize any audible words back to provider.  Basic labs only significant for mild leukocytosis of 12,000 with no identifiable cause.  Urine clean, chest x-ray shows no acute process CT head is negative and CT abdomen pelvis also shows no acute process.  Intermittently patient has improvement to a GCS of 14 only due to mild confusion.  Patient eventually returns back to a GCS of approximately 8.  Ammonia negative, neuro exam is unremarkable and there are no meningeal signs concerning for encephalitis.  Unsure of the etiology.  I discussed the case with my hospitalist Dr. Reyes who recd possible transfer for neurology evaluation.      Pt handed off to Dr. Figueroa at 0600    Amount and/or Complexity of Data Reviewed  Labs: ordered. Decision-making details documented in ED Course.  Radiology: ordered and independent interpretation performed. Decision-making details documented in ED Course.  ECG/medicine tests: ordered and independent interpretation performed. Decision-making details documented in ED Course.    Risk  OTC drugs.  Prescription drug management.                                   Clinical  Impression:  Final diagnoses:  [R41.82] AMS (altered mental status)  [R41.82] Altered mental status, unspecified altered mental status type (Primary)  [R41.0] Delirium  [R73.9] Hyperglycemia  [D72.829] Leukocytosis, unspecified type  [R00.0] Tachycardia          ED Disposition Condition    Transfer to Another Facility Kilo oTmlinson MD  11/24/23 0967

## 2023-11-24 NOTE — H&P
[facility] Hospital Medicine History & Physical Examination       Patient Name: Demetrice Barrett  MRN: [id number]  Patient Class: IP- Inpatient   Admission Date: 11/24/2023   Admitting Physician: JAYY Service   Length of Stay: 0  Attending Physician: Dr. Jean Carlos Kat   Primary Care Provider: Naz Lugo MD  Face-to-Face encounter date: 11/24/2023  Code Status: DNR  Chief Complaint: confusion, altered mental status      Patient information was obtained from patient, patient's family, past medical records and ER records.     HISTORY OF PRESENT ILLNESS:   Demetrice Barrett is a 78 y.o. female who PMH includes aortic valve sclerosis, CHF, DM type 2, HTN, HLD, PVD, dementia, tubular adenoma of the colon; presents to the ED at [facility] with reports per the family of elevated blood glucose and associated altered mental status.  Her daughter is at the bedside providing majority history.  It was reported the daughter found the patient standing on the counter in vacation confused and altered.  Family HX patient's blood sugar and it was in the 300s.  They gave her metformin medication.  They reported the intermittent episode of staring off in altered mental status and confusion.  Patient does not recall the events and does not know how she got on to the counter.  She was brought to [facility] ED for further evaluation.  No reports chest pain, fever, chills, cough, congestion, or any sick contact.  Family is concerned that patient had a urinary tract infection and reports this has happened to her in the past about a year ago when her blood sugar was elevated.  In reviewing patient's medical record her last A1c was 9.7 on 11/06/2023.  In reviewing her home medications she is on metformin and pioglitazone.  Workup at [facility] facility lab work reviewed demonstrated 12.14, sodium 133, creatinine 1.10, glucose 294, magnesium 1.5, other indices unremarkable.  Chest x-ray personally reviewed  demonstrated no acute cardiopulmonary process.  CT of the head without contrast impression reviewed demonstrated no acute intracranial abnormality identified, findings of chronic microvascular ischemic disease.  CT of the abdomen and pelvis with contrast impression reviewed demonstrated no acute intra-abdominal or pelvic solid organ or bowel pathology identified, stool in the ascending transverse and sigmoid colon which could reflect an element of constipation.  Family was concerned about pins shins mental status which she initially presented to the outlying ED with a GCS of 8 which improved to GCS of 15 inpatient subsequently had a change in mental status at outlying facility and her GCS drop back to 8.  Hospital medicine services over there recommended neurology evaluation.  Patient was transferred to [facility] on 11/24/2023 for higher level of care and neurology Services evaluation. Daughter states she is DNR/DNI.  Patient is admitted to hospital medicine services for further management.    PAST MEDICAL HISTORY:   DM type 2   HTN  HLD  PVD   Aortic valve sclerosis   Tubular adenoma of the colon   Osteoporosis  CHF  Dementia    PAST SURGICAL HISTORY:     Past Surgical History:   Procedure Laterality Date    COLONOSCOPY N/A 12/04/2020    COLONOSCOPY W/ BIOPSIES AND POLYPECTOMY      TOTAL ABDOMINAL HYSTERECTOMY         ALLERGIES:   Patient has no known allergies.    FAMILY HISTORY:   Reviewed and negative    SOCIAL HISTORY:   No reports of tobacco use   No reports of illicit drug use   Denies any alcohol use  Lives with family    HOME MEDICATIONS:   As documented  Prior to Admission medications    Medication Sig Start Date End Date Taking? Authorizing Provider   alendronate (FOSAMAX) 70 MG tablet TAKE 1 TABLET EVERY WEEK AS DIRECTED;  SEE PACKAGE FOR ADDITIONAL INSTRUCTIONS 2/1/23   Naz Lugo MD   amLODIPine (NORVASC) 10 MG tablet Take 10 mg by mouth once daily at 6am. 4/28/22   Provider, Historical   enalapril  (VASOTEC) 20 MG tablet TAKE 1 TABLET EVERY DAY 6/6/22   Naz Lugo MD   metFORMIN (GLUCOPHAGE) 1000 MG tablet Take 1 tablet (1,000 mg total) by mouth 2 (two) times daily with meals. 11/7/23   Naz Lugo MD   nitrofurantoin, macrocrystal-monohydrate, (MACROBID) 100 MG capsule Take 100 mg by mouth 2 (two) times daily.    Provider, Historical   pioglitazone (ACTOS) 45 MG tablet Take 1 tablet (45 mg total) by mouth once daily. 3/20/23 3/19/24  Naz Lugo MD   rosuvastatin (CRESTOR) 20 MG tablet Take 20 mg by mouth once daily at 6am. 3/23/22   Provider, Historical   TRUE METRIX GLUCOSE TEST STRIP Strp Inject 1 strip into the skin once daily. For diabetes E11.65 9/25/23 9/24/24  Naz Lugo MD   TRUEPLUS LANCETS 33 gauge Misc TEST BLOOD SUGAR TWICE DAILY 7/4/22   Naz Lugo MD       REVIEW OF SYSTEMS:   Except as documented, all other systems reviewed and negative     PHYSICAL EXAM:     VITAL SIGNS: 24 HRS MIN & MAX LAST   Temp  Min: 98.2 °F (36.8 °C)  Max: 98.7 °F (37.1 °C)     BP  Min: 135/73  Max: 196/90     Pulse  Min: 69  Max: 91      Resp  Min: 17  Max: 24     SpO2  Min: 97 %  Max: 100 %         General appearance:  Elderly female chronically ill-appearing; nontoxic, intermittently confused, daughter at the bedside  HENT: Atraumatic head. Moist mucous membranes of oral cavity.  Eyes: PERRL  Lungs: Clear to auscultation bilaterally. No wheezing present.   Heart: Regular rate and rhythm. S1 and S2 present cap refill brisk  Abdomen: Soft, non-distended, non-tender. No rebound tenderness/guarding. Bowel sounds are normal.   Extremities: No cyanosis, clubbing, VALENCIA, generalized weakness  Skin: warm and dry  Neuro: oriented to person and place, intermittent confusion; no focal deficits  Psych/mental status: flat affect. cooperative    LABS AND IMAGING:     Recent Labs   Lab 11/23/23  2322   WBC 12.14*   RBC 4.68   HGB 13.3   HCT 42.0   MCV 89.7   MCH 28.4   MCHC 31.7*   RDW 12.9      MPV  11.2*       Recent Labs   Lab 11/23/23  2322   *   K 3.7   CO2 23   BUN 14.7   CREATININE 1.10*   CALCIUM 9.4   MG 1.50*   ALBUMIN 4.3   ALKPHOS 78   ALT 27   AST 17   BILITOT 0.4       Microbiology Results (last 7 days)       ** No results found for the last 168 hours. **             CT Abdomen Pelvis With IV Contrast NO Oral Contrast  Narrative: Technique:CT of the abdomen and pelvis was performed with axial images as well as sagittal and coronal reconstruction images with intravenous contrast.    Comparison: December 2, 2022 none available.    Clinical History:Altered Mental Status (Patient brought in by family with concerns for elevated glucose. States around 8pm she became lethargic, her CBG was in the 300s, gave metformin and CBG was 280 at 10pm, but states her cognition stated the same. Arouses but needs firm direction. Extremities equal movement. ).    Dosage Information:Automated Exposure Control was utilized.      Findings:    Lines and Tubes:None.    Thorax:    Lungs:There is mild nonspecific dependent change at the lung bases. No focal infiltrate or consolidation is seen.    Pleura:No effusions or thickening.    Heart:The heart size is within normal limits.    Abdomen:    Abdominal Wall:No abdominal wall pathology is seen.    Liver:The liver appears unremarkable.    Biliary System:No intrahepatic or extrahepatic biliary duct dilatation is seen.    Gallbladder:The gallbladder appears unremarkable.    Pancreas:The pancreas appears unremarkable.    Spleen:The spleen appears unremarkable.    Adrenals:The adrenal glands appear unremarkable.    Kidneys:The right kidney appears unremarkable with no stones cysts masses or hydronephrosis. Multiple cysts are identified in the left kidney the largest of which measures 7 mm is on image 29, Series 3 in the upper pole of the left kidney and for these no specific follow-up imaging is recommended..  The left kidney otherwise appears unremarkable with no  stones masses or hydronephrosis identified.    Aorta:Scattered calcification of the abdominal aorta and its branches.    Bowel:    Esophagus:The visualized esophagus appears unremarkable.    Stomach:The stomach is nondistended otherwise appears unremarkable.    Duodenum:Unremarkable appearing duodenum.    Small Bowel:The small bowel appears unremarkable.    Colon:Nondistended. There is moderate stool in the ascending transverse and sigmoid colon which could reflect an element of constipation.    Appendix:The appendix is not identified but no inflammatory changes are seen in the right lower quadrant to suggest appendicitis.    Peritoneum:No intraperitoneal free air or ascites is seen.    Pelvis:    Bladder:The bladder appears unremarkable.    Female:    Uterus:The uterus appears unremarkable.    Ovaries:The ovaries are not identified.    Bony structures:    Dorsal Spine:The visualized dorsal spine appears unremarkable.    Bony Pelvis:The visualized bony structures of the pelvis appear unremarkable.  Impression: Impression:    No acute intraabdominal or pelvic solid organ or bowel pathology identified. Details and other findings as discussed above.    No significant discrepancy with overnight report.    Electronically signed by: Elgin Benitez  Date:    11/24/2023  Time:    10:05  CT Head Without Contrast  Narrative: EXAMINATION:  CT HEAD WITHOUT CONTRAST    CLINICAL HISTORY:  Mental status change, unknown cause;    TECHNIQUE:  Low dose axial images were obtained through the head.  Coronal and sagittal reformations were also performed. Contrast was not administered.    Automatic exposure control was utilized to reduce the patient's radiation dose.    DLP= 2210    COMPARISON:  10/13/2022    FINDINGS:  No acute intracranial hemorrhage, edema or mass. No acute parenchymal abnormality.    Mild cerebral atrophy with concordant ventricular enlargement.    Scattered hypodensities throughout the deep periventricular white  matter.    The osseous structures are normal.    The mastoid air cells are clear.    The auditory canals are patent bilaterally.    The globes and orbital contents are normal bilaterally.    The visualized maxillary, ethmoid and sphenoid sinuses are clear.  Impression: No acute intracranial abnormality identified.  Findings of chronic microvascular ischemic disease.    Electronically signed by: Toño Shaw  Date:    11/24/2023  Time:    06:47  X-Ray Chest 1 View  Narrative: EXAMINATION:  XR CHEST 1 VIEW    CLINICAL HISTORY:  AMS;    TECHNIQUE:  Single view of the chest    COMPARISON:  06/11/2012    FINDINGS:  No focal opacification, pleural effusion, or pneumothorax.    The cardiomediastinal silhouette is within normal limits.    No acute osseous abnormality.  Impression: No acute cardiopulmonary process.    Electronically signed by: Toño Shaw  Date:    11/24/2023  Time:    06:28        ASSESSMENT & PLAN:   ASSESSMENT:  Acute encephalopathy- metabolic- POA  DM type II with hyperglycemia- POA  JEFFRY- POA  Leukocytosis- likely stress response- POA  Hyponatremia- POA  Hypomagnesemia- POA  HTN- essential- POA  Aortic valve sclerosis- chronic- POA  Dementia- chronic- POA  Weakness- POA      PLAN:  Consult Neurology Services appreciate assistance and recommendations  Neurochecks q.4 hours  IV hydration  Fall precautions  Accuchecks with sliding scale  PT OT eval and treat  Echocardiogram  Labs in the am  Home medication as deemed necessary      VTE Prophylaxis: Lovenox for DVT prophylaxis and will be advised to be as mobile as possible and sit in a chair as tolerated    Patient condition:  Stable  __________________________________________________________________________  INPATIENT LIST OF MEDICATIONS     Scheduled Meds:   enoxparin  40 mg Subcutaneous Daily     Continuous Infusions:   lactated ringers       PRN Meds:.acetaminophen, acetaminophen, insulin aspart U-100, naloxone, ondansetron, prochlorperazine, sodium  chloride 0.9%      Reyes GRAYSON FNP have reviewed and discussed the case with Dr. Jean Carlos Kat.  Please see the following addendum for further assessment and plan from there attending MD.  ANGEL Herrera   11/24/2023    ________________________________________________________________________________    MD Addendum:  Dr. KENAN ---assumed care of this patient today at ---am/pm  For the patient encounter, I performed the substantive portion of the visit, I reviewed the NP/PA documentation, treatment plan, and medical decision making.  I had face to face time with this patient     A. History:    B. Physical exam:    C. Medical decision making:    Discharge Planning and Disposition: No mobility needs. Ambulating well. Good social support system.   Anticipated discharge    All diagnosis and differential diagnosis have been reviewed; assessment and plan has been documented; I have personally reviewed the labs and test results that are presently available; I have reviewed the patients medication list; I have reviewed the consulting providers response and recommendations. I have reviewed or attempted to review medical records based upon their availability.    All of the patient and family questions have been addressed and answered. Patient's is agreeable to the above stated plan. I will continue to monitor closely and make adjustments to medical management as needed.

## 2023-11-24 NOTE — ED NOTES
Deejay here for transport; pt sitting up in bed, eating lunch tray (sandwhich and chips); no distress noted upon leaving; daughter at bedside

## 2023-11-25 PROBLEM — F05 DELIRIUM DUE TO ANOTHER MEDICAL CONDITION: Status: ACTIVE | Noted: 2023-11-25

## 2023-11-25 LAB
ALBUMIN SERPL-MCNC: 3.5 G/DL (ref 3.4–4.8)
ALBUMIN/GLOB SERPL: 1.2 RATIO (ref 1.1–2)
ALP SERPL-CCNC: 62 UNIT/L (ref 40–150)
ALT SERPL-CCNC: 17 UNIT/L (ref 0–55)
AST SERPL-CCNC: 11 UNIT/L (ref 5–34)
BASOPHILS # BLD AUTO: 0.03 X10(3)/MCL
BASOPHILS NFR BLD AUTO: 0.3 %
BILIRUB SERPL-MCNC: 0.5 MG/DL
BUN SERPL-MCNC: 11.2 MG/DL (ref 9.8–20.1)
CALCIUM SERPL-MCNC: 8.4 MG/DL (ref 8.4–10.2)
CHLORIDE SERPL-SCNC: 106 MMOL/L (ref 98–107)
CO2 SERPL-SCNC: 24 MMOL/L (ref 23–31)
CREAT SERPL-MCNC: 0.78 MG/DL (ref 0.55–1.02)
CRP SERPL HS-MCNC: 2.82 MG/L
EOSINOPHIL # BLD AUTO: 0.04 X10(3)/MCL (ref 0–0.9)
EOSINOPHIL NFR BLD AUTO: 0.5 %
ERYTHROCYTE [DISTWIDTH] IN BLOOD BY AUTOMATED COUNT: 13.2 % (ref 11.5–17)
GFR SERPLBLD CREATININE-BSD FMLA CKD-EPI: >60 MLS/MIN/1.73/M2
GLOBULIN SER-MCNC: 2.9 GM/DL (ref 2.4–3.5)
GLUCOSE SERPL-MCNC: 243 MG/DL (ref 82–115)
HCT VFR BLD AUTO: 38.6 % (ref 37–47)
HGB BLD-MCNC: 13.1 G/DL (ref 12–16)
IMM GRANULOCYTES # BLD AUTO: 0.02 X10(3)/MCL (ref 0–0.04)
IMM GRANULOCYTES NFR BLD AUTO: 0.2 %
LYMPHOCYTES # BLD AUTO: 2.86 X10(3)/MCL (ref 0.6–4.6)
LYMPHOCYTES NFR BLD AUTO: 32.8 %
MAGNESIUM SERPL-MCNC: 1.8 MG/DL (ref 1.6–2.6)
MCH RBC QN AUTO: 29.7 PG (ref 27–31)
MCHC RBC AUTO-ENTMCNC: 33.9 G/DL (ref 33–36)
MCV RBC AUTO: 87.5 FL (ref 80–94)
MONOCYTES # BLD AUTO: 0.53 X10(3)/MCL (ref 0.1–1.3)
MONOCYTES NFR BLD AUTO: 6.1 %
NEUTROPHILS # BLD AUTO: 5.23 X10(3)/MCL (ref 2.1–9.2)
NEUTROPHILS NFR BLD AUTO: 60.1 %
NRBC BLD AUTO-RTO: 0 %
PLATELET # BLD AUTO: 276 X10(3)/MCL (ref 130–400)
PMV BLD AUTO: 11 FL (ref 7.4–10.4)
POCT GLUCOSE: 197 MG/DL (ref 70–110)
POCT GLUCOSE: 247 MG/DL (ref 70–110)
POTASSIUM SERPL-SCNC: 3.6 MMOL/L (ref 3.5–5.1)
PROT SERPL-MCNC: 6.4 GM/DL (ref 5.8–7.6)
RBC # BLD AUTO: 4.41 X10(6)/MCL (ref 4.2–5.4)
SODIUM SERPL-SCNC: 139 MMOL/L (ref 136–145)
WBC # SPEC AUTO: 8.71 X10(3)/MCL (ref 4.5–11.5)

## 2023-11-25 PROCEDURE — 25000003 PHARM REV CODE 250: Performed by: INTERNAL MEDICINE

## 2023-11-25 PROCEDURE — 25000003 PHARM REV CODE 250: Performed by: NURSE PRACTITIONER

## 2023-11-25 PROCEDURE — 86141 C-REACTIVE PROTEIN HS: CPT | Performed by: SPECIALIST

## 2023-11-25 PROCEDURE — 83735 ASSAY OF MAGNESIUM: CPT | Performed by: NURSE PRACTITIONER

## 2023-11-25 PROCEDURE — 85025 COMPLETE CBC W/AUTO DIFF WBC: CPT | Performed by: NURSE PRACTITIONER

## 2023-11-25 PROCEDURE — 97161 PT EVAL LOW COMPLEX 20 MIN: CPT

## 2023-11-25 PROCEDURE — 99222 1ST HOSP IP/OBS MODERATE 55: CPT | Mod: ,,, | Performed by: SPECIALIST

## 2023-11-25 PROCEDURE — 63600175 PHARM REV CODE 636 W HCPCS: Performed by: NURSE PRACTITIONER

## 2023-11-25 PROCEDURE — 21400001 HC TELEMETRY ROOM

## 2023-11-25 PROCEDURE — 80053 COMPREHEN METABOLIC PANEL: CPT | Performed by: NURSE PRACTITIONER

## 2023-11-25 PROCEDURE — 99222 PR INITIAL HOSPITAL CARE,LEVL II: ICD-10-PCS | Mod: ,,, | Performed by: SPECIALIST

## 2023-11-25 PROCEDURE — 97166 OT EVAL MOD COMPLEX 45 MIN: CPT

## 2023-11-25 RX ORDER — METFORMIN HYDROCHLORIDE 500 MG/1
1000 TABLET ORAL 2 TIMES DAILY WITH MEALS
Status: DISCONTINUED | OUTPATIENT
Start: 2023-11-25 | End: 2023-11-27 | Stop reason: HOSPADM

## 2023-11-25 RX ORDER — LISINOPRIL 20 MG/1
40 TABLET ORAL DAILY
Status: DISCONTINUED | OUTPATIENT
Start: 2023-11-25 | End: 2023-11-27 | Stop reason: HOSPADM

## 2023-11-25 RX ORDER — AMLODIPINE BESYLATE 5 MG/1
10 TABLET ORAL DAILY
Status: DISCONTINUED | OUTPATIENT
Start: 2023-11-25 | End: 2023-11-27 | Stop reason: HOSPADM

## 2023-11-25 RX ORDER — IBUPROFEN 400 MG/1
400 TABLET ORAL ONCE
Status: COMPLETED | OUTPATIENT
Start: 2023-11-25 | End: 2023-11-25

## 2023-11-25 RX ADMIN — LISINOPRIL 40 MG: 20 TABLET ORAL at 12:11

## 2023-11-25 RX ADMIN — ENOXAPARIN SODIUM 40 MG: 40 INJECTION SUBCUTANEOUS at 05:11

## 2023-11-25 RX ADMIN — ACETAMINOPHEN 325MG 650 MG: 325 TABLET ORAL at 05:11

## 2023-11-25 RX ADMIN — ACETAMINOPHEN 325MG 325 MG: 325 TABLET ORAL at 12:11

## 2023-11-25 RX ADMIN — HYDRALAZINE HYDROCHLORIDE 10 MG: 20 INJECTION, SOLUTION INTRAMUSCULAR; INTRAVENOUS at 04:11

## 2023-11-25 RX ADMIN — INSULIN ASPART 2 UNITS: 100 INJECTION, SOLUTION INTRAVENOUS; SUBCUTANEOUS at 12:11

## 2023-11-25 RX ADMIN — SODIUM CHLORIDE, POTASSIUM CHLORIDE, SODIUM LACTATE AND CALCIUM CHLORIDE: 600; 310; 30; 20 INJECTION, SOLUTION INTRAVENOUS at 07:11

## 2023-11-25 RX ADMIN — AMLODIPINE BESYLATE 10 MG: 5 TABLET ORAL at 12:11

## 2023-11-25 RX ADMIN — SODIUM CHLORIDE, POTASSIUM CHLORIDE, SODIUM LACTATE AND CALCIUM CHLORIDE: 600; 310; 30; 20 INJECTION, SOLUTION INTRAVENOUS at 06:11

## 2023-11-25 RX ADMIN — METFORMIN HYDROCHLORIDE 1000 MG: 500 TABLET, FILM COATED ORAL at 05:11

## 2023-11-25 RX ADMIN — ACETAMINOPHEN 1000 MG: 500 TABLET ORAL at 06:11

## 2023-11-25 RX ADMIN — INSULIN ASPART 2 UNITS: 100 INJECTION, SOLUTION INTRAVENOUS; SUBCUTANEOUS at 04:11

## 2023-11-25 RX ADMIN — IBUPROFEN 400 MG: 400 TABLET ORAL at 04:11

## 2023-11-25 NOTE — PT/OT/SLP EVAL
Physical Therapy Evaluation    Patient Name:  Demetrice Barrett   MRN:  [id number]    Recommendations:     Discharge therapy intensity: Low Intensity Therapy   Discharge Equipment Recommendations: none   Barriers to discharge:    Assessment:     Demetrice Barrett is a 78 y.o. female admitted with a medical diagnosis of <principal problem not specified>.  She presents with the following impairments/functional limitations: impaired endurance, impaired self care skills, impaired functional mobility . Pt was pretty drowsy but despite this she did pretty well fxn'lly.    Rehab Prognosis: Good; patient would benefit from acute skilled PT services to address these deficits and reach maximum level of function.    Recent Surgery: * No surgery found *      Plan:     During this hospitalization, patient to be seen 3 x/week to address the identified rehab impairments via   and progress toward the following goals:    Plan of Care Expires:       Subjective     Chief Complaint:   Patient/Family Comments/goals:   Pain/Comfort:  Pain Rating 1: 0/10    Patients cultural, spiritual, Scientologist conflicts given the current situation:      Living Environment:  Pt lives with  in a mobile hoe with 3 steps and a ramp  Prior to admission, patients level of function was ind.  Equipment used at home: none.  DME owned (not currently used): .  Upon discharge, patient will have assistance from .    Objective:     Communicated with nurse prior to session.  Patient found supine with PureWick  upon PT entry to room.    General Precautions: Standard, fall  Orthopedic Precautions:    Braces:    Respiratory Status: Room air  Blood Pressure:       Exams:  RLE ROM: WFL  RLE Strength: WFL  LLE ROM: WFL  LLE Strength: WFL  Skin integrity: Visible skin intact      Functional Mobility:  Bed Mobility:     Supine to Sit: independence  Sit to Supine: independence  Transfers:     Sit to Stand:  stand by assistance with no AD  Bed to Chair: stand by  assistance with  no AD  using  Step Transfer  Gait: pt ambulated sba /cga with no ad 150ft      AM-PAC 6 CLICK MOBILITY  Total Score:        Treatment & Education:      Patient and daughter/s were provided with verbal education education regarding poc.  Understanding was verbalized.     Patient left up in chair with all lines intact and call button in reach.    GOALS:   Multidisciplinary Problems       Physical Therapy Goals          Problem: Physical Therapy    Goal Priority Disciplines Outcome Goal Variances Interventions   Physical Therapy Goal     PT, PT/OT Ongoing, Progressing     Description: Pt will be seen for the following goals  1. Pt will be ind with all transfers  2. Pt will ambulate with no ad 200ft                        History:     Past Medical History:   Diagnosis Date    Aortic valve sclerosis 7/17/2023    COVID-19 07/08/2020    Diabetes mellitus type II, uncontrolled     HTN (hypertension)     Hypercholesterolemia     Internal hemorrhoids     Obesity     Peripheral vascular disease 7/17/2023    Mild on US 2023    Tubular adenoma of colon        Past Surgical History:   Procedure Laterality Date    COLONOSCOPY N/A 12/04/2020    COLONOSCOPY W/ BIOPSIES AND POLYPECTOMY      TOTAL ABDOMINAL HYSTERECTOMY         Time Tracking:     PT Received On:    PT Start Time: 0950     PT Stop Time: 1011  PT Total Time (min): 21 min     Billable Minutes: Evaluation 21 11/25/2023

## 2023-11-25 NOTE — PLAN OF CARE
Problem: Physical Therapy  Goal: Physical Therapy Goal  Description: Pt will be seen for the following goals  1. Pt will be ind with all transfers  2. Pt will ambulate with no ad 200ft   Outcome: Ongoing, Progressing

## 2023-11-25 NOTE — PT/OT/SLP EVAL
Occupational Therapy   Evaluation and Discharge Note    Name: Demetrice Barrett  MRN: [id number]  Admitting Diagnosis: AMS  Recent Surgery: * No surgery found *      Recommendations:     Discharge therapy intensity: No Therapy Indicated   Discharge Equipment Recommendations:    Barriers to discharge:  None    Assessment:     Demetrice Barrett is a 78 y.o. female with a medical diagnosis of AMS. On eval, patient presents with headache/fatigue. Skilled OT services are not warranted at this time.    Plan:     OT to sign off as acute OT services are not warranted at this time.  Please re-consult if situation changes during this hospitalization.    Plan of Care Reviewed with: patient, daughter    Subjective     Chief Complaint: Sleepy  Patient/Family Comments/goals: to return to prior level of functioning    Occupational Profile:  Living Environment: [facility] with ramp, lives with spouse  Previous level of function: independent  Equipment Used at Home: none  Assistance upon Discharge: , daughter    Patients cultural, spiritual, Cheondoism conflicts given the current situation:      Objective:     Patient was found up in chair with telemetry, peripheral IV, pulse ox (continuous) upon OT entry to room.    General Precautions: Standard, fall    Functional Mobility/Transfers:  Patient completed Sit <> Stand Transfer with contact guard assistance  with  rolling walker   Patient completed Bed <> Chair Transfer using Step Transfer technique with contact guard assistance with rolling walker  Patient completed Toilet Transfer Step Transfer technique with contact guard assistance with  rolling walker  Functional Mobility: no LOB    Activities of Daily Living:  Grooming: independence    Upper Body Dressing: independence    Lower Body Dressing: independence      AMPAC 6 Click ADL:  AMPAC Total Score: 20    Functional Cognition:  Intact    Upper Extremity Function:  Right Upper Extremity:   WFL    Left Upper Extremity:  WFL    Balance:    Static sitting balance: WFL  Dynamic sitting balance:WFL    Therapeutic Positioning  Risk for acquired pressure injuries is decreased due to ability to get to BSC/toilet with assist.    OT interventions performed during the course of today's session in an effort to prevent and/or reduce acquired pressure injuries:   Education was provided on benefits of and recommendations for therapeutic positioning    OT recommendations for therapeutic positioning throughout hospitalization:   Follow [facility] Pressure Injury Prevention Protocol    Patient Education:  Patient and daughter/s were provided with verbal education education regarding OT role/goals/POC, fall prevention, and safety awareness.  Understanding was verbalized.     Patient left up in chair with all lines intact, call button in reach, and daughter present    History:     Past Medical History:   Diagnosis Date    Aortic valve sclerosis 7/17/2023    COVID-19 07/08/2020    Diabetes mellitus type II, uncontrolled     HTN (hypertension)     Hypercholesterolemia     Internal hemorrhoids     Obesity     Peripheral vascular disease 7/17/2023    Mild on US 2023    Tubular adenoma of colon          Past Surgical History:   Procedure Laterality Date    COLONOSCOPY N/A 12/04/2020    COLONOSCOPY W/ BIOPSIES AND POLYPECTOMY      TOTAL ABDOMINAL HYSTERECTOMY         Time Tracking:     OT Date of Treatment: 11/25/23  OT Start Time: 1050  OT Stop Time: 1105  OT Total Time (min): 15 min    Billable Minutes:Evaluation 15    11/25/2023

## 2023-11-25 NOTE — PROGRESS NOTES
[facility] Hospital Medicine Progress Note        Chief Complaint: Inpatient follow-up on metabolic encephalopathy     HPI:   Demetrice Barrett is a 78 Year old  female whose PMH includes aortic valve sclerosis, CHF, DM type 2, HTN, HLD, PVD, dementia, tubular adenoma of the colon; presents to the ED at [facility] with reports per the family of elevated blood glucose and associated altered mental status.  Her daughter is at the bedside providing majority history.  It was reported the daughter found the patient standing on the counter in the kitchen, confused and altered. Patient's blood sugar was in the 300s.  They gave her metformin medication.  They reported the intermittent episode of staring off in altered mental status and confusion.  Patient does not recall the events and does not know how she got on to the counter.  [facility]  No reports chest pain, fever, chills, cough, congestion, or any sick contact.  Family is concerned that patient had a urinary tract infection and reports this has happened to her in the past about a year ago when her blood sugar was elevated.  In reviewing patient's medical record her last A1c was 9.7 on 11/06/2023.  In reviewing her home medications she is on metformin and pioglitazone.  [facility] lab work reviewed demonstrated 12.14, sodium 133, creatinine 1.10, glucose 294, magnesium 1.5, other indices unremarkable.  Chest x-ray personally reviewed demonstrated no acute cardiopulmonary process.  CT of the head without contrast impression reviewed demonstrated no acute intracranial abnormality identified, findings of chronic microvascular ischemic disease.  CT of the abdomen and pelvis with contrast impression reviewed demonstrated no acute intra-abdominal or pelvic solid organ or bowel pathology identified, stool in the ascending transverse and sigmoid colon which could  reflect an element of constipation.  Family was concerned about pins shins mental status which she initially presented to the outlying ED with a GCS of 8 which improved to GCS of 15 inpatient subsequently had a change in mental status at outlying facility and her GCS drop back to 8.  Hospital medicine services over there recommended neurology evaluation.  Patient was transferred to [facility] on 11/24/2023 for higher level of care and neurology Services evaluation. Daughter states she is DNR/DNI.  Patient is admitted to hospital medicine services for further management.       Interval Hx:   Patient is resting comfortably no new issues reported patient is afebrile, on room air, and hemodynamically stable.    Objective/physical exam:  General:  Elderly  female in no acute distress  HENT: normocephalic, atraumatic  Eye: PERRL, EOMI, clear conjunctiva  Neck: full ROM, no thyromegaly, no JVD  Respiratory: clear to auscultation bilaterally  Cardiovascular: regular rate and rhythm  Gastrointestinal: non-distended, positive bowel sounds, non-tender  Musculoskeletal: no gross deformity  Integumentary: warm, dry, intact, no rashes  Neurological: cranial nerves grossly intact, no focal neurological deficit; intermittent confusion  Psychiatric: cooperative, flat affect, psychomotor retardation, poor eye contact, nonconversant    VITAL SIGNS: 24 HRS MIN & MAX LAST   Temp  Min: 98 °F (36.7 °C)  Max: 99.3 °F (37.4 °C) 98.6 °F (37 °C)   BP  Min: 154/72  Max: 186/70 (!) 155/80   Pulse  Min: 65  Max: 95  74   Resp  Min: 18  Max: 18 18   SpO2  Min: 98 %  Max: 100 % 98 %     I have reviewed the following labs:  Recent Labs   Lab 11/23/23 2322 11/25/23 0458   WBC 12.14* 8.71   RBC 4.68 4.41   HGB 13.3 13.1   HCT 42.0 38.6   MCV 89.7 87.5   MCH 28.4 29.7   MCHC 31.7* 33.9   RDW 12.9 13.2    276   MPV 11.2* 11.0*     Recent Labs   Lab 11/23/23 2322 11/25/23 0458   * 139   K 3.7 3.6   CO2 23 24   BUN 14.7 11.2    CREATININE 1.10* 0.78   CALCIUM 9.4 8.4   MG 1.50* 1.80   ALBUMIN 4.3 3.5   ALKPHOS 78 62   ALT 27 17   AST 17 11   BILITOT 0.4 0.5     Microbiology Results (last 7 days)       ** No results found for the last 168 hours. **             See below for Radiology    Scheduled Med:   amLODIPine  10 mg Oral Daily    enoxparin  40 mg Subcutaneous Daily    lisinopriL  40 mg Oral Daily    metFORMIN  1,000 mg Oral BID WM      Continuous Infusions:   lactated ringers 75 mL/hr at 11/25/23 0605      PRN Meds:  acetaminophen, acetaminophen, hydrALAZINE, insulin aspart U-100, labetaloL, naloxone, ondansetron, prochlorperazine, sodium chloride 0.9%     Assessment/Plan:  Metabolic encephalopathy superimposed on dementia  Poorly controlled non-insulin-dependent type 2 diabetes mellitus   Acute kidney injury   Electrolyte imbalance  Constipation  Essential hypertension  Non-insulin-dependent type 2 diabetes mellitus  Do not resuscitate        Plan:  Continue supportive care    VTE prophylaxis:  Lovenox    Patient condition:  Stable    Anticipated discharge and Disposition:  Home       All diagnosis and differential diagnosis have been reviewed; assessment and plan has been documented; I have personally reviewed the labs and test results that are presently available; I have reviewed the patients medication list; I have reviewed the consulting providers response and recommendations. I have reviewed or attempted to review medical records based upon their availability    All of the patient's questions have been  addressed and answered. Patient's is agreeable to the above stated plan. I will continue to monitor closely and make adjustments to medical management as needed.  _____________________________________________________________________      Radiology:  I have personally reviewed the following imaging and agree with the radiologist.     CT Abdomen Pelvis With IV Contrast NO Oral Contrast  Narrative: Technique:CT of the abdomen and  pelvis was performed with axial images as well as sagittal and coronal reconstruction images with intravenous contrast.    Comparison: December 2, 2022 none available.    Clinical History:Altered Mental Status (Patient brought in by family with concerns for elevated glucose. States around 8pm she became lethargic, her CBG was in the 300s, gave metformin and CBG was 280 at 10pm, but states her cognition stated the same. Arouses but needs firm direction. Extremities equal movement. ).    Dosage Information:Automated Exposure Control was utilized.      Findings:    Lines and Tubes:None.    Thorax:    Lungs:There is mild nonspecific dependent change at the lung bases. No focal infiltrate or consolidation is seen.    Pleura:No effusions or thickening.    Heart:The heart size is within normal limits.    Abdomen:    Abdominal Wall:No abdominal wall pathology is seen.    Liver:The liver appears unremarkable.    Biliary System:No intrahepatic or extrahepatic biliary duct dilatation is seen.    Gallbladder:The gallbladder appears unremarkable.    Pancreas:The pancreas appears unremarkable.    Spleen:The spleen appears unremarkable.    Adrenals:The adrenal glands appear unremarkable.    Kidneys:The right kidney appears unremarkable with no stones cysts masses or hydronephrosis. Multiple cysts are identified in the left kidney the largest of which measures 7 mm is on image 29, Series 3 in the upper pole of the left kidney and for these no specific follow-up imaging is recommended..  The left kidney otherwise appears unremarkable with no stones masses or hydronephrosis identified.    Aorta:Scattered calcification of the abdominal aorta and its branches.    Bowel:    Esophagus:The visualized esophagus appears unremarkable.    Stomach:The stomach is nondistended otherwise appears unremarkable.    Duodenum:Unremarkable appearing duodenum.    Small Bowel:The small bowel appears unremarkable.    Colon:Nondistended. There is  moderate stool in the ascending transverse and sigmoid colon which could reflect an element of constipation.    Appendix:The appendix is not identified but no inflammatory changes are seen in the right lower quadrant to suggest appendicitis.    Peritoneum:No intraperitoneal free air or ascites is seen.    Pelvis:    Bladder:The bladder appears unremarkable.    Female:    Uterus:The uterus appears unremarkable.    Ovaries:The ovaries are not identified.    Bony structures:    Dorsal Spine:The visualized dorsal spine appears unremarkable.    Bony Pelvis:The visualized bony structures of the pelvis appear unremarkable.  Impression: Impression:    No acute intraabdominal or pelvic solid organ or bowel pathology identified. Details and other findings as discussed above.    No significant discrepancy with overnight report.    Electronically signed by: Elgin Benitez  Date:    11/24/2023  Time:    10:05  CT Head Without Contrast  Narrative: EXAMINATION:  CT HEAD WITHOUT CONTRAST    CLINICAL HISTORY:  Mental status change, unknown cause;    TECHNIQUE:  Low dose axial images were obtained through the head.  Coronal and sagittal reformations were also performed. Contrast was not administered.    Automatic exposure control was utilized to reduce the patient's radiation dose.    DLP= 2210    COMPARISON:  10/13/2022    FINDINGS:  No acute intracranial hemorrhage, edema or mass. No acute parenchymal abnormality.    Mild cerebral atrophy with concordant ventricular enlargement.    Scattered hypodensities throughout the deep periventricular white matter.    The osseous structures are normal.    The mastoid air cells are clear.    The auditory canals are patent bilaterally.    The globes and orbital contents are normal bilaterally.    The visualized maxillary, ethmoid and sphenoid sinuses are clear.  Impression: No acute intracranial abnormality identified.  Findings of chronic microvascular ischemic disease.    Electronically signed  by: Toño Shaw  Date:    11/24/2023  Time:    06:47  X-Ray Chest 1 View  Narrative: EXAMINATION:  XR CHEST 1 VIEW    CLINICAL HISTORY:  AMS;    TECHNIQUE:  Single view of the chest    COMPARISON:  06/11/2012    FINDINGS:  No focal opacification, pleural effusion, or pneumothorax.    The cardiomediastinal silhouette is within normal limits.    No acute osseous abnormality.  Impression: No acute cardiopulmonary process.    Electronically signed by: Toño Shaw  Date:    11/24/2023  Time:    06:28      Jean Carlos Kat MD   11/25/2023

## 2023-11-25 NOTE — CONSULTS
"Neurology Consult Note    Patient Name: Demetrice Barrett  MRN: [id number]  Admission Date: 11/24/2023  Hospital Length of Stay: 1 days  Consulting Provider: Trevor Guerra MD  Primary Care Physician: Naz Lugo MD  Principal Problem:<principal problem not specified>    Inpatient consult to Neurology  Consult performed by: Trevor Guerra MD  Consult ordered by: Reyes Dejesus FNP         Subjective:     Chief Complaint/HPI:  confusion  Dtr found her lying down on kitchen counter two days ago     Dtr descr this happens when her sugar is up (was >300)   She has been told in the past that her mother has dementia but she countered that only occurs when sugar up     Her mother does not drive and is always accompanied     Dtr did add that her mother had been co HA     Interim Hx since arrival:   Stable since here     Review of Systems  Doesn't participate much    a valid ROS would be quesitonable   Current Outpatient Medications   Medication Instructions    alendronate (FOSAMAX) 70 MG tablet TAKE 1 TABLET EVERY WEEK AS DIRECTED;  SEE PACKAGE FOR ADDITIONAL INSTRUCTIONS    amLODIPine (NORVASC) 10 mg, Oral, Daily    enalapril (VASOTEC) 20 MG tablet TAKE 1 TABLET EVERY DAY    metFORMIN (GLUCOPHAGE) 1,000 mg, Oral, 2 times daily with meals    nitrofurantoin, macrocrystal-monohydrate, (MACROBID) 100 MG capsule 100 mg, Oral, 2 times daily    pioglitazone (ACTOS) 45 mg, Oral, Daily    rosuvastatin (CRESTOR) 20 mg, Oral, Daily    TRUE METRIX GLUCOSE TEST STRIP Strp 1 strip, Subcutaneous, Daily, For diabetes E11.65    TRUEPLUS LANCETS 33 gauge Misc TEST BLOOD SUGAR TWICE DAILY         Objective:      Vitals:    11/25/23 0725   BP: (!) 169/72   Pulse: 73   Resp: 18   Temp: 99.1 °F (37.3 °C)     Exam:   General Exam  if accompanied, by__ dtr   body habitus_  mental status_  heart__    Neurological    cortical function__ unclear; when I asked her to protrude her tongue she asked "why?" And refused   she did puff her " "cheeks on command   Speech __ few words I heard were ok    "[place]" where she lives she reported     cranial nerves:  CN 2 VF_ok    CN 3, 4, 6 EOMs_ok  CN 3, pupils_ok  CN 12 tongue_ would not allow me to see it     Motor__ opens and closes B hands equally well       Neuroimaging:  Study: ct head   rads: No acute intracranial abnormality identified.  Findings of chronic microvascular ischemic disease.  My comments: agree   .     Labs: I'd added crp and was normal   Others ok   Sugars 200s           Assessment/Plan:       Active Hospital Problems    Diagnosis    Delirium due to another medical condition   Diabetes assoc delirium likely w superimposed dementia   Headache     Other comments/ follow up:      Apologies for definitive dx being elusive   Thankfully patient no longer drives nor is left alone   I have no other rec's   Signing off     Trevor Guerra MD GHAZAL      "

## 2023-11-26 LAB
POCT GLUCOSE: 172 MG/DL (ref 70–110)
POCT GLUCOSE: 190 MG/DL (ref 70–110)
POCT GLUCOSE: 218 MG/DL (ref 70–110)
POCT GLUCOSE: 223 MG/DL (ref 70–110)
POCT GLUCOSE: 230 MG/DL (ref 70–110)

## 2023-11-26 PROCEDURE — 25000003 PHARM REV CODE 250: Performed by: INTERNAL MEDICINE

## 2023-11-26 PROCEDURE — 21400001 HC TELEMETRY ROOM

## 2023-11-26 PROCEDURE — 63600175 PHARM REV CODE 636 W HCPCS: Performed by: NURSE PRACTITIONER

## 2023-11-26 RX ADMIN — METFORMIN HYDROCHLORIDE 1000 MG: 500 TABLET, FILM COATED ORAL at 08:11

## 2023-11-26 RX ADMIN — AMLODIPINE BESYLATE 10 MG: 5 TABLET ORAL at 08:11

## 2023-11-26 RX ADMIN — ENOXAPARIN SODIUM 40 MG: 40 INJECTION SUBCUTANEOUS at 05:11

## 2023-11-26 RX ADMIN — LISINOPRIL 40 MG: 20 TABLET ORAL at 08:11

## 2023-11-26 RX ADMIN — INSULIN ASPART 2 UNITS: 100 INJECTION, SOLUTION INTRAVENOUS; SUBCUTANEOUS at 12:11

## 2023-11-26 RX ADMIN — METFORMIN HYDROCHLORIDE 1000 MG: 500 TABLET, FILM COATED ORAL at 05:11

## 2023-11-26 RX ADMIN — INSULIN ASPART 1 UNITS: 100 INJECTION, SOLUTION INTRAVENOUS; SUBCUTANEOUS at 08:11

## 2023-11-26 NOTE — PROGRESS NOTES
[facility] Hospital Medicine Progress Note        Chief Complaint: Inpatient follow-up on metabolic encephalopathy     HPI:   Demetrice Barrett is a 78 Year old  female whose PMH includes aortic valve sclerosis, CHF, DM type 2, HTN, HLD, PVD, dementia, tubular adenoma of the colon; presents to the ED at [facility] with reports per the family of elevated blood glucose and associated altered mental status.  Her daughter is at the bedside providing majority history.  It was reported the daughter found the patient standing on the counter in the kitchen, confused and altered. Patient's blood sugar was in the 300s.  They gave her metformin medication.  They reported the intermittent episode of staring off in altered mental status and confusion.  Patient does not recall the events and does not know how she got on to the counter.  [facility]  No reports chest pain, fever, chills, cough, congestion, or any sick contact.  Family is concerned that patient had a urinary tract infection and reports this has happened to her in the past about a year ago when her blood sugar was elevated.  In reviewing patient's medical record her last A1c was 9.7 on 11/06/2023.  In reviewing her home medications she is on metformin and pioglitazone.  [facility] lab work reviewed demonstrated 12.14, sodium 133, creatinine 1.10, glucose 294, magnesium 1.5, other indices unremarkable.  Chest x-ray personally reviewed demonstrated no acute cardiopulmonary process.  CT of the head without contrast impression reviewed demonstrated no acute intracranial abnormality identified, findings of chronic microvascular ischemic disease.  CT of the abdomen and pelvis with contrast impression reviewed demonstrated no acute intra-abdominal or pelvic solid organ or bowel pathology identified, stool in the ascending transverse and sigmoid colon which could  reflect an element of constipation.  Family was concerned about pins shins mental status which she initially presented to the outlying ED with a GCS of 8 which improved to GCS of 15 inpatient subsequently had a change in mental status at outlying facility and her GCS drop back to 8.  Hospital medicine services over there recommended neurology evaluation.  Patient was transferred to [facility] on 11/24/2023 for higher level of care and neurology Services evaluation. Daughter states she is DNR/DNI.  Patient is admitted to hospital medicine services for further management.       Interval Hx:   Patient is awake in bed with no new issues reported. Patient is afebrile, on room air, and hemodynamically stable.  Mental status seems to be at baseline no family is present at this time.    Objective/physical exam:  General:  Elderly  female in no acute distress  HENT: normocephalic, atraumatic  Eye: PERRL, EOMI, clear conjunctiva  Neck: full ROM, no thyromegaly, no JVD  Respiratory: clear to auscultation bilaterally  Cardiovascular: regular rate and rhythm  Gastrointestinal: non-distended, positive bowel sounds, non-tender  Musculoskeletal: no gross deformity  Integumentary: warm, dry, intact, no rashes  Neurological: cranial nerves grossly intact, no focal neurological deficit; intermittent confusion  Psychiatric: cooperative, flat affect, psychomotor retardation, poor eye contact, nonconversant    VITAL SIGNS: 24 HRS MIN & MAX LAST   Temp  Min: 97.6 °F (36.4 °C)  Max: 98.9 °F (37.2 °C) 97.6 °F (36.4 °C)   BP  Min: 144/64  Max: 158/78 (!) 157/74   Pulse  Min: 59  Max: 91  74   Resp  Min: 18  Max: 18 18   SpO2  Min: 96 %  Max: 99 % 99 %     I have reviewed the following labs:  Recent Labs   Lab 11/23/23  2322 11/25/23  0458   WBC 12.14* 8.71   RBC 4.68 4.41   HGB 13.3 13.1   HCT 42.0 38.6   MCV 89.7 87.5   MCH 28.4 29.7   MCHC 31.7* 33.9   RDW 12.9 13.2    276   MPV 11.2* 11.0*     Recent Labs   Lab  11/23/23  2322 11/25/23  0458   * 139   K 3.7 3.6   CO2 23 24   BUN 14.7 11.2   CREATININE 1.10* 0.78   CALCIUM 9.4 8.4   MG 1.50* 1.80   ALBUMIN 4.3 3.5   ALKPHOS 78 62   ALT 27 17   AST 17 11   BILITOT 0.4 0.5     Microbiology Results (last 7 days)       ** No results found for the last 168 hours. **             See below for Radiology    Scheduled Med:   amLODIPine  10 mg Oral Daily    enoxparin  40 mg Subcutaneous Daily    lisinopriL  40 mg Oral Daily    metFORMIN  1,000 mg Oral BID WM      Continuous Infusions:   lactated ringers 75 mL/hr at 11/25/23 1930      PRN Meds:  acetaminophen, acetaminophen, hydrALAZINE, insulin aspart U-100, labetaloL, naloxone, ondansetron, prochlorperazine, sodium chloride 0.9%     Assessment/Plan:  Metabolic encephalopathy superimposed on dementia  Poorly controlled non-insulin-dependent type 2 diabetes mellitus   Acute kidney injury   Electrolyte imbalance  Constipation  Essential hypertension  Non-insulin-dependent type 2 diabetes mellitus  Do not resuscitate        Plan:  Continue supportive care    VTE prophylaxis:  Lovenox    Patient condition:  Stable    Anticipated discharge and Disposition:  Home tomorrow      All diagnosis and differential diagnosis have been reviewed; assessment and plan has been documented; I have personally reviewed the labs and test results that are presently available; I have reviewed the patients medication list; I have reviewed the consulting providers response and recommendations. I have reviewed or attempted to review medical records based upon their availability    All of the patient's questions have been  addressed and answered. Patient's is agreeable to the above stated plan. I will continue to monitor closely and make adjustments to medical management as needed.  _____________________________________________________________________      Radiology:  I have personally reviewed the following imaging and agree with the radiologist.     CT  Abdomen Pelvis With IV Contrast NO Oral Contrast  Narrative: Technique:CT of the abdomen and pelvis was performed with axial images as well as sagittal and coronal reconstruction images with intravenous contrast.    Comparison: December 2, 2022 none available.    Clinical History:Altered Mental Status (Patient brought in by family with concerns for elevated glucose. States around 8pm she became lethargic, her CBG was in the 300s, gave metformin and CBG was 280 at 10pm, but states her cognition stated the same. Arouses but needs firm direction. Extremities equal movement. ).    Dosage Information:Automated Exposure Control was utilized.      Findings:    Lines and Tubes:None.    Thorax:    Lungs:There is mild nonspecific dependent change at the lung bases. No focal infiltrate or consolidation is seen.    Pleura:No effusions or thickening.    Heart:The heart size is within normal limits.    Abdomen:    Abdominal Wall:No abdominal wall pathology is seen.    Liver:The liver appears unremarkable.    Biliary System:No intrahepatic or extrahepatic biliary duct dilatation is seen.    Gallbladder:The gallbladder appears unremarkable.    Pancreas:The pancreas appears unremarkable.    Spleen:The spleen appears unremarkable.    Adrenals:The adrenal glands appear unremarkable.    Kidneys:The right kidney appears unremarkable with no stones cysts masses or hydronephrosis. Multiple cysts are identified in the left kidney the largest of which measures 7 mm is on image 29, Series 3 in the upper pole of the left kidney and for these no specific follow-up imaging is recommended..  The left kidney otherwise appears unremarkable with no stones masses or hydronephrosis identified.    Aorta:Scattered calcification of the abdominal aorta and its branches.    Bowel:    Esophagus:The visualized esophagus appears unremarkable.    Stomach:The stomach is nondistended otherwise appears unremarkable.    Duodenum:Unremarkable appearing  duodenum.    Small Bowel:The small bowel appears unremarkable.    Colon:Nondistended. There is moderate stool in the ascending transverse and sigmoid colon which could reflect an element of constipation.    Appendix:The appendix is not identified but no inflammatory changes are seen in the right lower quadrant to suggest appendicitis.    Peritoneum:No intraperitoneal free air or ascites is seen.    Pelvis:    Bladder:The bladder appears unremarkable.    Female:    Uterus:The uterus appears unremarkable.    Ovaries:The ovaries are not identified.    Bony structures:    Dorsal Spine:The visualized dorsal spine appears unremarkable.    Bony Pelvis:The visualized bony structures of the pelvis appear unremarkable.  Impression: Impression:    No acute intraabdominal or pelvic solid organ or bowel pathology identified. Details and other findings as discussed above.    No significant discrepancy with overnight report.    Electronically signed by: Elgin Benitez  Date:    11/24/2023  Time:    10:05  CT Head Without Contrast  Narrative: EXAMINATION:  CT HEAD WITHOUT CONTRAST    CLINICAL HISTORY:  Mental status change, unknown cause;    TECHNIQUE:  Low dose axial images were obtained through the head.  Coronal and sagittal reformations were also performed. Contrast was not administered.    Automatic exposure control was utilized to reduce the patient's radiation dose.    DLP= 2210    COMPARISON:  10/13/2022    FINDINGS:  No acute intracranial hemorrhage, edema or mass. No acute parenchymal abnormality.    Mild cerebral atrophy with concordant ventricular enlargement.    Scattered hypodensities throughout the deep periventricular white matter.    The osseous structures are normal.    The mastoid air cells are clear.    The auditory canals are patent bilaterally.    The globes and orbital contents are normal bilaterally.    The visualized maxillary, ethmoid and sphenoid sinuses are clear.  Impression: No acute intracranial  abnormality identified.  Findings of chronic microvascular ischemic disease.    Electronically signed by: Toño Shaw  Date:    11/24/2023  Time:    06:47  X-Ray Chest 1 View  Narrative: EXAMINATION:  XR CHEST 1 VIEW    CLINICAL HISTORY:  AMS;    TECHNIQUE:  Single view of the chest    COMPARISON:  06/11/2012    FINDINGS:  No focal opacification, pleural effusion, or pneumothorax.    The cardiomediastinal silhouette is within normal limits.    No acute osseous abnormality.  Impression: No acute cardiopulmonary process.    Electronically signed by: Toño Shaw  Date:    11/24/2023  Time:    06:28      Jean Carlos Kat MD   11/26/2023

## 2023-11-27 ENCOUNTER — TELEPHONE (OUTPATIENT)
Dept: PRIMARY CARE CLINIC | Facility: CLINIC | Age: 78
End: 2023-11-27
Payer: MEDICARE

## 2023-11-27 VITALS
OXYGEN SATURATION: 98 % | WEIGHT: 150 LBS | RESPIRATION RATE: 18 BRPM | HEART RATE: 67 BPM | BODY MASS INDEX: 28.32 KG/M2 | SYSTOLIC BLOOD PRESSURE: 162 MMHG | HEIGHT: 61 IN | DIASTOLIC BLOOD PRESSURE: 73 MMHG | TEMPERATURE: 98 F

## 2023-11-27 PROBLEM — G93.41 ENCEPHALOPATHY, METABOLIC: Status: ACTIVE | Noted: 2023-11-27

## 2023-11-27 LAB
POCT GLUCOSE: 147 MG/DL (ref 70–110)
POCT GLUCOSE: 163 MG/DL (ref 70–110)
POCT GLUCOSE: 193 MG/DL (ref 70–110)
POCT GLUCOSE: 240 MG/DL (ref 70–110)

## 2023-11-27 PROCEDURE — 25000003 PHARM REV CODE 250: Performed by: INTERNAL MEDICINE

## 2023-11-27 RX ADMIN — LISINOPRIL 40 MG: 20 TABLET ORAL at 09:11

## 2023-11-27 RX ADMIN — AMLODIPINE BESYLATE 10 MG: 5 TABLET ORAL at 09:11

## 2023-11-27 RX ADMIN — METFORMIN HYDROCHLORIDE 1000 MG: 500 TABLET, FILM COATED ORAL at 09:11

## 2023-11-27 NOTE — TELEPHONE ENCOUNTER
Spoke to daughter Ena states she will follow up her mother wasn't a david of her discharge.Will call back with more information.

## 2023-11-27 NOTE — TELEPHONE ENCOUNTER
Discharged today from [facility] with altered mental status, I'm not sure if she was back to baseline with the notes. Check and see if she's gone home and if they had home health set up.

## 2023-11-28 ENCOUNTER — PATIENT OUTREACH (OUTPATIENT)
Dept: ADMINISTRATIVE | Facility: CLINIC | Age: 78
End: 2023-11-28
Payer: MEDICARE

## 2023-11-28 NOTE — PROGRESS NOTES
C3 nurse attempted to contact Demetrice Barrett  for a TCC post hospital discharge follow up call. No answer. Left voicemail with callback information. The patient does not have a scheduled HOSFU appointment with Naz Lugo MD  within 5-7 days post hospital discharge date 11/27/2023.     Message sent to PCP staff requesting they contact patient and schedule follow up appointment.

## 2023-11-29 NOTE — PHYSICIAN QUERY
PT Name: Demetrice Barrett  MR #: [id number]     DOCUMENTATION CLARIFICATION      CDS/: Lyndsay Patton    RN           Contact information: [email or web address]  This form is a permanent document in the medical record.     Query Date: November 29, 2023    By submitting this query, we are merely seeking further clarification of documentation.  Please utilize your independent clinical judgment when addressing the question(s) below.     The Medical Record contains the following:    Clinical Information Location in Medical Record   PMH includes aortic valve sclerosis, CHF, DM type 2, HTN, HLD, PVD, dementia, tubular adenoma of the colon;     presents to the ED at [facility] with reports per the family of elevated blood glucose and associated altered mental status.      In reviewing patient's medical record her last A1c was 9.7 on 11/06/2023.  In reviewing her home medications she is on metformin and pioglitazone.  [facility] reviewed demonstrated 12.14, sodium 133, creatinine 1.10, glucose 294, magnesium 1.5, other indices unremarkable.      Family was concerned about pins shins mental status which she initially presented to the [facility] ED with a GCS of 8 which improved to GCS of 15 inpatient subsequently had a change in mental status at [facility] facility and her GCS drop back to 8.  Hospital medicine services over there recommended neurology evaluation.  Patient was transferred to [facility] on 11/24/2023 for higher level of care and neurology Services evaluation.    Daughter states she is DNR/DNI.  Patient is admitted to hospital medicine services for further management.     VITAL SIGNS: 24 HRS MIN & MAX LAST   Temp  Min: 97.6 °F (36.4 °C)  Max: 98.9 °F (37.2 °C) 97.6 °F (36.4 °C)   BP  Min: 144/64  Max: 158/78 (!) 157/74   Pulse  Min: 59  Max: 91  74   Resp  Min: 18  Max: 18 18   SpO2  Min: 96 %  Max: 99 % 99 %     Recent Labs   Lab 11/23/23  2322 11/25/23  0458   WBC 12.14*  8.71   RBC 4.68 4.41   HGB 13.3 13.1     Recent Labs   Lab 11/23/23  2322 11/25/23  0458   * 139   K 3.7 3.6   CO2 23 24   BUN 14.7 11.2   CREATININE 1.10* 0.78     Assessment/Plan:  Metabolic encephalopathy superimposed on dementia  Poorly controlled non-insulin-dependent type 2 diabetes mellitus   Acute kidney injury   Electrolyte imbalance  Constipation  Essential hypertension  Non-insulin-dependent type 2 diabetes mellitus  Do not resuscitate  Progress Note  11/26 (Shey)                                                                                                                                                     Please document your best medical opinion regarding the etiology of __ Metabolic encephalopathy _____?       [   ] Metabolic encephalopathy Due to JEFFRY   [  X ] Metabolic encephalopathy Due to diabetes mellitus    [   ] Other etiology (please specify):___________________   [  ] Clinically Undetermined             Please document in your progress notes daily for the duration of treatment, until resolved, and include in your discharge summary.

## 2023-11-29 NOTE — PROGRESS NOTES
C3 nurse attempted to contact Demetrice Barrett  for a TCC post hospital discharge follow up call. No answer. Left voicemail with callback information. The patient does not have a scheduled HOSFU appointment with Naz Lugo MD  within 5-7 days post hospital discharge date 11/27/2023.

## 2023-12-08 NOTE — DISCHARGE SUMMARY
[facility]  Hospital Medicine Discharge Summary    Admit Date: 11/24/2023  Discharge Date and Time: 11/27/2023 11:48 AM  Admitting Physician:  Team  Discharging Physician: Jean Carlos Kat MD.  Primary Care Physician: Naz Lugo MD  Consults: Neurology    Discharge Diagnoses:  Metabolic encephalopathy superimposed on dementia  Poorly controlled non-insulin-dependent type 2 diabetes mellitus   Acute kidney injury   Electrolyte imbalance  Constipation  Essential hypertension  Non-insulin-dependent type 2 diabetes mellitus  Do not resuscitate    Hospital Course:   Demetrice Barrett is a 78 Year old  female whose PMH includes aortic valve sclerosis, CHF, DM type 2, HTN, HLD, PVD, dementia, tubular adenoma of the colon; presents to the ED at [facility] with reports per the family of elevated blood glucose and associated altered mental status.  Her daughter is at the bedside providing majority history.  It was reported the daughter found the patient standing on the counter in the kitchen, confused and altered. Patient's blood sugar was in the 300s.  They gave her metformin medication.  They reported the intermittent episode of staring off in altered mental status and confusion.  Patient does not recall the events and does not know how she got on to the counter.  She was brought to [facility] ED for further evaluation.  No reports chest pain, fever, chills, cough, congestion, or any sick contact.  Family is concerned that patient had a urinary tract infection and reports this has happened to her in the past about a year ago when her blood sugar was elevated.  In reviewing patient's medical record her last A1c was 9.7 on 11/06/2023.  In reviewing her home medications she is on metformin and pioglitazone.  Workup at [facility] facility lab work reviewed demonstrated 12.14, sodium 133, creatinine 1.10, glucose 294, magnesium 1.5, other indices unremarkable.  Chest  x-ray personally reviewed demonstrated no acute cardiopulmonary process.  CT of the head without contrast impression reviewed demonstrated no acute intracranial abnormality identified, findings of chronic microvascular ischemic disease.  CT of the abdomen and pelvis with contrast impression reviewed demonstrated no acute intra-abdominal or pelvic solid organ or bowel pathology identified, stool in the ascending transverse and sigmoid colon which could reflect an element of constipation.  Family was concerned about pins shins mental status which she initially presented to the outlying ED with a GCS of 8 which improved to GCS of 15 inpatient subsequently had a change in mental status at outlying facility and her GCS drop back to 8.  Hospital medicine services over there recommended neurology evaluation.  Patient was transferred to [facility] on 11/24/2023 for higher level of care and neurology Services evaluation. Daughter states she is DNR/DNI.  Patient is admitted to hospital medicine services for further management.  Neurology evaluated the patient had nothing to offer.  Patient's mental status returned to baseline with supportive care.  Patient was seen and examined on the day of discharge.    Vitals:  VITAL SIGNS: 24 HRS MIN & MAX LAST   No data recorded 97.9 °F (36.6 °C)   No data recorded (!) 162/73   No data recorded  67   No data recorded 18   No data recorded 98 %       Physical Exam:  General:  Elderly  female in no acute distress  HENT: normocephalic, atraumatic  Eye: PERRL, EOMI, clear conjunctiva  Neck: full ROM, no thyromegaly, no JVD  Respiratory: clear to auscultation bilaterally  Cardiovascular: regular rate and rhythm  Gastrointestinal: non-distended, positive bowel sounds, non-tender  Musculoskeletal: no gross deformity  Integumentary: warm, dry, intact, no rashes  Neurological: cranial nerves grossly intact, no focal neurological deficit; intermittent confusion  Psychiatric:  "cooperative, flat affect, psychomotor retardation, poor eye contact, nonconversant    Procedures Performed: No admission procedures for hospital encounter.     Significant Diagnostic Studies: See Full reports for all details    No results for input(s): "WBC", "RBC", "HGB", "HCT", "MCV", "MCH", "MCHC", "RDW", "PLT", "MPV", "GRAN", "LYMPH", "MONO", "BASO", "NRBC" in the last 168 hours.    No results for input(s): "NA", "K", "CL", "CO2", "ANIONGAP", "BUN", "CREATININE", "GLU", "CALCIUM", "PH", "MG", "ALBUMIN", "PROT", "ALKPHOS", "ALT", "AST", "BILITOT" in the last 168 hours.     Microbiology Results (last 7 days)       ** No results found for the last 168 hours. **             CT Abdomen Pelvis With IV Contrast NO Oral Contrast  Narrative: Technique:CT of the abdomen and pelvis was performed with axial images as well as sagittal and coronal reconstruction images with intravenous contrast.    Comparison: December 2, 2022 none available.    Clinical History:Altered Mental Status (Patient brought in by family with concerns for elevated glucose. States around 8pm she became lethargic, her CBG was in the 300s, gave metformin and CBG was 280 at 10pm, but states her cognition stated the same. Arouses but needs firm direction. Extremities equal movement. ).    Dosage Information:Automated Exposure Control was utilized.      Findings:    Lines and Tubes:None.    Thorax:    Lungs:There is mild nonspecific dependent change at the lung bases. No focal infiltrate or consolidation is seen.    Pleura:No effusions or thickening.    Heart:The heart size is within normal limits.    Abdomen:    Abdominal Wall:No abdominal wall pathology is seen.    Liver:The liver appears unremarkable.    Biliary System:No intrahepatic or extrahepatic biliary duct dilatation is seen.    Gallbladder:The gallbladder appears unremarkable.    Pancreas:The pancreas appears unremarkable.    Spleen:The spleen appears unremarkable.    Adrenals:The adrenal " glands appear unremarkable.    Kidneys:The right kidney appears unremarkable with no stones cysts masses or hydronephrosis. Multiple cysts are identified in the left kidney the largest of which measures 7 mm is on image 29, Series 3 in the upper pole of the left kidney and for these no specific follow-up imaging is recommended..  The left kidney otherwise appears unremarkable with no stones masses or hydronephrosis identified.    Aorta:Scattered calcification of the abdominal aorta and its branches.    Bowel:    Esophagus:The visualized esophagus appears unremarkable.    Stomach:The stomach is nondistended otherwise appears unremarkable.    Duodenum:Unremarkable appearing duodenum.    Small Bowel:The small bowel appears unremarkable.    Colon:Nondistended. There is moderate stool in the ascending transverse and sigmoid colon which could reflect an element of constipation.    Appendix:The appendix is not identified but no inflammatory changes are seen in the right lower quadrant to suggest appendicitis.    Peritoneum:No intraperitoneal free air or ascites is seen.    Pelvis:    Bladder:The bladder appears unremarkable.    Female:    Uterus:The uterus appears unremarkable.    Ovaries:The ovaries are not identified.    Bony structures:    Dorsal Spine:The visualized dorsal spine appears unremarkable.    Bony Pelvis:The visualized bony structures of the pelvis appear unremarkable.  Impression: Impression:    No acute intraabdominal or pelvic solid organ or bowel pathology identified. Details and other findings as discussed above.    No significant discrepancy with overnight report.    Electronically signed by: Elgin Benitez  Date:    11/24/2023  Time:    10:05  CT Head Without Contrast  Narrative: EXAMINATION:  CT HEAD WITHOUT CONTRAST    CLINICAL HISTORY:  Mental status change, unknown cause;    TECHNIQUE:  Low dose axial images were obtained through the head.  Coronal and sagittal reformations were also performed.  Contrast was not administered.    Automatic exposure control was utilized to reduce the patient's radiation dose.    DLP= 2210    COMPARISON:  10/13/2022    FINDINGS:  No acute intracranial hemorrhage, edema or mass. No acute parenchymal abnormality.    Mild cerebral atrophy with concordant ventricular enlargement.    Scattered hypodensities throughout the deep periventricular white matter.    The osseous structures are normal.    The mastoid air cells are clear.    The auditory canals are patent bilaterally.    The globes and orbital contents are normal bilaterally.    The visualized maxillary, ethmoid and sphenoid sinuses are clear.  Impression: No acute intracranial abnormality identified.  Findings of chronic microvascular ischemic disease.    Electronically signed by: Toño Shaw  Date:    11/24/2023  Time:    06:47  X-Ray Chest 1 View  Narrative: EXAMINATION:  XR CHEST 1 VIEW    CLINICAL HISTORY:  AMS;    TECHNIQUE:  Single view of the chest    COMPARISON:  06/11/2012    FINDINGS:  No focal opacification, pleural effusion, or pneumothorax.    The cardiomediastinal silhouette is within normal limits.    No acute osseous abnormality.  Impression: No acute cardiopulmonary process.    Electronically signed by: Toño Shaw  Date:    11/24/2023  Time:    06:28         Medication List        CONTINUE taking these medications      alendronate 70 MG tablet  Commonly known as: FOSAMAX  TAKE 1 TABLET EVERY WEEK AS DIRECTED;  SEE PACKAGE FOR ADDITIONAL INSTRUCTIONS     amLODIPine 10 MG tablet  Commonly known as: NORVASC     enalapril 20 MG tablet  Commonly known as: VASOTEC  TAKE 1 TABLET EVERY DAY     metFORMIN 1000 MG tablet  Commonly known as: GLUCOPHAGE  Take 1 tablet (1,000 mg total) by mouth 2 (two) times daily with meals.     rosuvastatin 20 MG tablet  Commonly known as: CRESTOR     TRUE METRIX GLUCOSE TEST STRIP Strp  Generic drug: blood sugar diagnostic  Inject 1 strip into the skin once daily. For diabetes  E11.65     TRUEPLUS LANCETS 33 gauge Misc  Generic drug: lancets  TEST BLOOD SUGAR TWICE DAILY            STOP taking these medications      nitrofurantoin (macrocrystal-monohydrate) 100 MG capsule  Commonly known as: MACROBID     pioglitazone 45 MG tablet  Commonly known as: ACTOS               Explained in detail to the patient about the discharge plan, medications, and follow-up visits. Pt understands and agrees with the treatment plan  Discharge Disposition: Home or Self Care   Discharged Condition: stable      For further questions contact your primary care provider    Discharge time 35 minutes    For worsening symptoms, chest pain, shortness of breath, increased abdominal pain, high grade fever, stroke or stroke like symptoms, immediately go to the nearest Emergency Room or call 911 as soon as possible.      Jean Carlos Ortiz M.D, on 12/8/2023. at 12:04 PM.

## 2024-02-05 PROBLEM — E11.40 TYPE 2 DIABETES MELLITUS WITH DIABETIC NEUROPATHY, WITHOUT LONG-TERM CURRENT USE OF INSULIN: Status: ACTIVE | Noted: 2024-02-05

## 2024-02-07 ENCOUNTER — TELEPHONE (OUTPATIENT)
Dept: PRIMARY CARE CLINIC | Facility: CLINIC | Age: 79
End: 2024-02-07
Payer: MEDICARE

## 2024-02-21 ENCOUNTER — TELEPHONE (OUTPATIENT)
Dept: PRIMARY CARE CLINIC | Facility: CLINIC | Age: 79
End: 2024-02-21
Payer: MEDICARE

## 2024-02-21 NOTE — TELEPHONE ENCOUNTER
PT CONFIRMED APPT     1. Are there any outstanding tasks in the patient's chart? (Ex. Labs, MMG)?-    2. Do we have any outstanding/Pending referrals?-    3. Has the patient been seen in an ER, Urgent Care or been admitted to the hospital since last office visit with our office?-    4. Has the patient seen any other health care provider (doctors) since last visit?-    5. Has the patient had any blood work or x-rays done since last visit?-    QUALITY-DO NOT ASK PATIENT    -PNEUMONIA  13:-  20:-  23:-    -COLON:20    -MM19    -PAP SMEAR: HYSTERECTOMY    -DEXA:22    -DIABETES SCREEN  A1C:23  MICRO UA:23  EYE EXAM:-  FOOT EXAM:23

## 2024-02-28 ENCOUNTER — OFFICE VISIT (OUTPATIENT)
Dept: PRIMARY CARE CLINIC | Facility: CLINIC | Age: 79
End: 2024-02-28
Payer: MEDICARE

## 2024-02-28 VITALS
WEIGHT: 160 LBS | HEART RATE: 74 BPM | DIASTOLIC BLOOD PRESSURE: 80 MMHG | RESPIRATION RATE: 16 BRPM | OXYGEN SATURATION: 99 % | TEMPERATURE: 99 F | HEIGHT: 61 IN | BODY MASS INDEX: 30.21 KG/M2 | SYSTOLIC BLOOD PRESSURE: 153 MMHG

## 2024-02-28 DIAGNOSIS — E11.65 UNCONTROLLED TYPE 2 DIABETES MELLITUS WITH HYPERGLYCEMIA: Primary | ICD-10-CM

## 2024-02-28 DIAGNOSIS — E83.39 HYPOPHOSPHATEMIA: ICD-10-CM

## 2024-02-28 DIAGNOSIS — I10 UNCONTROLLED HYPERTENSION: ICD-10-CM

## 2024-02-28 DIAGNOSIS — F03.90 DEMENTIA WITHOUT BEHAVIORAL DISTURBANCE: ICD-10-CM

## 2024-02-28 DIAGNOSIS — N28.9 RENAL INSUFFICIENCY: ICD-10-CM

## 2024-02-28 DIAGNOSIS — E11.40 TYPE 2 DIABETES MELLITUS WITH DIABETIC NEUROPATHY, WITHOUT LONG-TERM CURRENT USE OF INSULIN: ICD-10-CM

## 2024-02-28 DIAGNOSIS — Z91.199 MEDICALLY NONCOMPLIANT: ICD-10-CM

## 2024-02-28 PROCEDURE — 99214 OFFICE O/P EST MOD 30 MIN: CPT | Mod: ,,, | Performed by: INTERNAL MEDICINE

## 2024-02-28 PROCEDURE — 1159F MED LIST DOCD IN RCRD: CPT | Mod: CPTII,,, | Performed by: INTERNAL MEDICINE

## 2024-02-28 PROCEDURE — 1126F AMNT PAIN NOTED NONE PRSNT: CPT | Mod: CPTII,,, | Performed by: INTERNAL MEDICINE

## 2024-02-28 PROCEDURE — 3077F SYST BP >= 140 MM HG: CPT | Mod: CPTII,,, | Performed by: INTERNAL MEDICINE

## 2024-02-28 PROCEDURE — 1160F RVW MEDS BY RX/DR IN RCRD: CPT | Mod: CPTII,,, | Performed by: INTERNAL MEDICINE

## 2024-02-28 PROCEDURE — 36415 COLL VENOUS BLD VENIPUNCTURE: CPT | Mod: ,,, | Performed by: INTERNAL MEDICINE

## 2024-02-28 PROCEDURE — 1101F PT FALLS ASSESS-DOCD LE1/YR: CPT | Mod: CPTII,,, | Performed by: INTERNAL MEDICINE

## 2024-02-28 PROCEDURE — 3079F DIAST BP 80-89 MM HG: CPT | Mod: CPTII,,, | Performed by: INTERNAL MEDICINE

## 2024-02-28 PROCEDURE — 3288F FALL RISK ASSESSMENT DOCD: CPT | Mod: CPTII,,, | Performed by: INTERNAL MEDICINE

## 2024-02-28 RX ORDER — METFORMIN HYDROCHLORIDE 1000 MG/1
1000 TABLET ORAL 2 TIMES DAILY WITH MEALS
Qty: 180 TABLET | Refills: 1 | Status: SHIPPED | OUTPATIENT
Start: 2024-02-28 | End: 2024-04-23

## 2024-02-28 RX ORDER — AMLODIPINE BESYLATE 10 MG/1
10 TABLET ORAL DAILY
Qty: 90 TABLET | Refills: 1 | Status: SHIPPED | OUTPATIENT
Start: 2024-02-28

## 2024-02-28 RX ORDER — ROSUVASTATIN CALCIUM 20 MG/1
20 TABLET, COATED ORAL DAILY
Qty: 90 TABLET | Refills: 1 | Status: SHIPPED | OUTPATIENT
Start: 2024-02-28

## 2024-02-28 NOTE — LETTER
AUTHORIZATION FOR RELEASE OF   CONFIDENTIAL INFORMATION    Dear [facility],    We are seeing Demetrice Barrett, date of birth 1945, in the clinic at [facility]. Naz Lugo MD is the patient's PCP. Demetrice Barrett has an outstanding lab/procedure at the time we reviewed her chart. In order to help keep her health information updated, she has authorized us to request the following medical record(s):        (  )  MAMMOGRAM                                      (  )  COLONOSCOPY      (  )  PAP SMEAR                                          (  )  OUTSIDE LAB RESULTS     (  )  DEXA SCAN                                          ( X)  EYE EXAM            (  )  FOOT EXAM                                          (  )  ENTIRE RECORD     (  )  OUTSIDE IMMUNIZATIONS                 (  )  _______________         Please fax records to [facility], [place], Debra A, MD, [phone]     If you have any questions, please contact us at [phone].           Patient Name: Demetrice Barrett  : 1945  Patient Phone #: [phone]

## 2024-02-28 NOTE — PROGRESS NOTES
Naz Lugo MD   [address] [facility] [place] [address]     Patient ID: [id number]     Chief Complaint: Diabetes Mellitus        HPI:     Demetrice Barrett is a 78 y.o. female here today for a follow up of uncontrolled diabetes and hypertension. She is also followed for dementia and diastolic dysfunction. She has been doing ok, she had an admission in November for altered mental status and missed her follow up here. She did not follow up in [place] with any of the doctors. She only complains that her memory is not good.       Subjective:     Review of Systems   Constitutional:  Negative for malaise/fatigue.   Eyes:         [facility] last exam was 2022   Respiratory:  Negative for shortness of breath.    Cardiovascular:  Negative for chest pain, palpitations, orthopnea and leg swelling.   Gastrointestinal: Negative.    Genitourinary: Negative.    Neurological:         Neurology saw her in the hospital and had nothing to add.    Endo/Heme/Allergies:         Sugars are up and down, she can't recall any numbers she's gotten   Psychiatric/Behavioral: Negative.         Past Medical History:   Diagnosis Date    Aortic valve sclerosis 7/17/2023    COVID-19 07/08/2020    Diabetes mellitus type II, uncontrolled     HTN (hypertension)     Hypercholesterolemia     Internal hemorrhoids     Obesity     Peripheral vascular disease 7/17/2023    Mild on US 2023    Tubular adenoma of colon         Past Surgical History:   Procedure Laterality Date    COLONOSCOPY N/A 12/04/2020    COLONOSCOPY W/ BIOPSIES AND POLYPECTOMY      TOTAL ABDOMINAL HYSTERECTOMY         Family History   Problem Relation Age of Onset    Hyperlipidemia Mother     Hypertension Mother     Kidney disease Mother     Gout Mother     Stroke Sister     Hypertension Sister     Diabetes Sister     Heart disease Sister     Anemia Sister     Kidney disease Sister         Social History     Socioeconomic History    Marital status:    Tobacco Use    Smoking status:  Never    Smokeless tobacco: Never   Substance and Sexual Activity    Alcohol use: Never    Drug use: Never    Sexual activity: Not Currently     Social Determinants of Health     Financial Resource Strain: Low Risk  (6/22/2023)    Overall Financial Resource Strain (CARDIA)     Difficulty of Paying Living Expenses: Not hard at all   Food Insecurity: No Food Insecurity (6/22/2023)    Hunger Vital Sign     Worried About Running Out of Food in the Last Year: Never true     Ran Out of Food in the Last Year: Never true   Transportation Needs: No Transportation Needs (6/22/2023)    PRAPARE - Transportation     Lack of Transportation (Medical): No     Lack of Transportation (Non-Medical): No   Physical Activity: Sufficiently Active (6/22/2023)    Exercise Vital Sign     Days of Exercise per Week: 5 days     Minutes of Exercise per Session: 30 min   Stress: No Stress Concern Present (6/22/2023)    [employer] - Occupational Stress Questionnaire     Feeling of Stress : Only a little   Social Connections: Socially Integrated (6/22/2023)    Social Connection and Isolation Panel [NHANES]     Frequency of Communication with Friends and Family: More than three times a week     Frequency of Social Gatherings with Friends and Family: Three times a week     Attends [employer] Services: 1 to 4 times per year     Active Member of Clubs or Organizations: Yes     Attends Club or Organization Meetings: 1 to 4 times per year     Marital Status:    Housing Stability: Low Risk  (6/22/2023)    Housing Stability Vital Sign     Unable to Pay for Housing in the Last Year: No     Number of Places Lived in the Last Year: 1     Unstable Housing in the Last Year: No       Review of patient's allergies indicates:  No Known Allergies    Outpatient Medications Marked as Taking for the 2/28/24 encounter (Office Visit) with Naz Lugo MD   Medication Sig Dispense Refill    alendronate (FOSAMAX) 70 MG tablet TAKE 1  "TABLET EVERY WEEK AS DIRECTED;  SEE PACKAGE FOR ADDITIONAL INSTRUCTIONS 12 tablet 1    enalapril (VASOTEC) 20 MG tablet TAKE 1 TABLET EVERY DAY 90 tablet 1    TRUE METRIX GLUCOSE TEST STRIP Strp Inject 1 strip into the skin once daily. For diabetes E11.65 150 strip 3    TRUEPLUS LANCETS 33 gauge Misc TEST BLOOD SUGAR TWICE DAILY 200 each 3    [DISCONTINUED] amLODIPine (NORVASC) 10 MG tablet Take 10 mg by mouth once daily at 6am.      [DISCONTINUED] metFORMIN (GLUCOPHAGE) 1000 MG tablet Take 1 tablet (1,000 mg total) by mouth 2 (two) times daily with meals. 180 tablet 1    [DISCONTINUED] rosuvastatin (CRESTOR) 20 MG tablet Take 20 mg by mouth once daily at 6am.         Patient Care Team:  Naz Lugo MD as PCP - General (Internal Medicine)  Paty Marcelino MD as Consulting Physician (Neurology)  Love Singh MD as Consulting Physician (Cardiovascular Disease)  [id number], [facility]       Objective:     BP (!) 153/80   Pulse 74   Temp 98.6 °F (37 °C) (Oral)   Resp 16   Ht 5' 1" (1.549 m)   Wt 72.6 kg (160 lb)   SpO2 99%   BMI 30.23 kg/m²     Physical Exam  Vitals reviewed.   Constitutional:       Appearance: She is not ill-appearing.   Cardiovascular:      Rate and Rhythm: Normal rate and regular rhythm.   Pulmonary:      Effort: Pulmonary effort is normal.      Breath sounds: Normal breath sounds.   Abdominal:      General: Bowel sounds are normal.      Palpations: Abdomen is soft.      Tenderness: There is no abdominal tenderness. There is no guarding.   Musculoskeletal:         General: Tenderness present.      Comments: Back pain with laying and rising on exam table   Skin:     General: Skin is warm and dry.   Neurological:      Mental Status: She is alert.      Comments: Poor memory for 3 item recall   Psychiatric:         Mood and Affect: Mood normal.               Assessment/Plan:     1. Uncontrolled type 2 diabetes mellitus with hyperglycemia  -     Comprehensive Metabolic Panel  -     " Hemoglobin A1C    2. Type 2 diabetes mellitus with diabetic neuropathy, without long-term current use of insulin    3. Dementia without behavioral disturbance  Overview:  CBC CMP and TSH in 10/2021 unremarkable apart from mildly elevated LFTs, hepatic function panel in 2/2022 normal, A1C in 01/2022 8    MRI brain 10/2021 unremarkable, mild chronic microangiopathy noted    MOCA scores:  08/2022: 19/30- difficulty with language and recall      4. Renal insufficiency  -     CBC Auto Differential  -     Phosphorus    5. Hypophosphatemia  Comments:  Recheck level from hospital  Orders:  -     Cancel: Phosphorus; Future; Expected date: 02/29/2024  -     Phosphorus    6. Uncontrolled hypertension  Comments:  She has not been taking any of that medication, she wants to change to local pharmacy she thinks her neighbor takes her medication from mail    7. Medically noncompliant  Comments:  Will restart amlodipine, I'd like her renal function before adding back the enalapril.    Other orders  -     amLODIPine (NORVASC) 10 MG tablet; Take 1 tablet (10 mg total) by mouth once daily.  Dispense: 90 tablet; Refill: 1  -     metFORMIN (GLUCOPHAGE) 1000 MG tablet; Take 1 tablet (1,000 mg total) by mouth 2 (two) times daily with meals.  Dispense: 180 tablet; Refill: 1  -     rosuvastatin (CRESTOR) 20 MG tablet; Take 1 tablet (20 mg total) by mouth once daily.  Dispense: 90 tablet; Refill: 1             Follow up in about 3 months (around 5/28/2024) for Uncontrolled Diabetes, uncontrolled HTN. In addition to their scheduled follow up, the patient has also been instructed to follow up on as needed basis.     Signature:  Naz Lugo MD  [facility]  [address] [place] [address]

## 2024-02-28 NOTE — PATIENT INSTRUCTIONS
Patient Education       Diabetic Meal Planning   About this topic   Healthy eating is an important part of keeping your diabetes in control. A balanced diet along with your diabetic drugs will help you control your blood sugar. The right portions of healthy foods may help keep your sugar within your goal range. It may also help to lower or maintain your weight, manage cholesterol, the amount of fat in your blood, and blood pressure.       What will the results be?   Healthy eating may help you lower your blood sugar and keep it in a safe range. Keeping your blood sugar in a safe range may lower your chances for long term problems from your diabetes. You may be less likely to have damage to your kidneys, heart, eyes, or nerves.  What changes to diet are needed?   Healthy eating means:  Eating a range of foods from all food groups.  Eating the right size portions. Read the nutrition facts on each food you eat.  Eating meals and snacks at the same time each day. Do not skip a meal or snack. Skipping meals may cause your blood sugar to go too low, especially if you are on drugs for your diabetes. Skipping meals can also cause you to eat too much at the next meal.  Talk to your diabetes educator about making a personal meal plan for you. They can also help you eat the foods you like by modifying them to be healthier.  Who should use this diet?   This diet is helpful to people with high blood sugar or diabetes.   What foods are good to eat?   It is important to make a healthy meal. Eat a variety of different foods in the right portion.  Fill half of your plate with non-starchy vegetables. Non-starchy vegetables include: Broccoli, green beans, carrots, greens ([place], kale, mustard, turnip), onions, tomatoes, asparagus, beets, cauliflower, celery, and cucumber. Non-starchy vegetables are high in fiber and low in carbohydrates. These will help keep you full for longer without raising your blood sugar.  Fill 1/4 of your  plate with carbohydrates. Try to choose whole grain options. Whole-grain products have more fiber which will make you feel full. Carbohydrates include: Bread, rice, pasta, and starchy vegetables. Starchy vegetables include beans, potatoes, acorn squash, butternut squash, corn, and peas.  Fill 1/4 of your plate with protein. Choose low-fat cuts of meat like boneless, skinless chicken breast; pork loin; 90% lean beef; lean and skinless turkey meat; and fresh fish (not fried) such as tuna, salmon, or mackerel.  Add a low-fat or non-fat dairy product like 8 ounces (240 mL) of 1% or skim milk or 6 ounces (180 mL) of low-fat yogurt. Eat the recommended serving. Milk and yogurt have carbohydrates which raise your blood sugar.  Add a small piece of fruit or 1/2 cup (120 grams) of frozen or canned fruit. Choose canned fruits in juice or water. Fruits include apples, bananas, peaches, pears, pineapple, plums, and oranges. Eat the recommended serving. Fruit has carbohydrates which can raise your blood sugar.  Include healthy fats in your meal like olive oil, canola oil, avocado, and nuts.  Other good foods to include in your diet are:  Foods high in fiber. Adding fiber to your meals may help control your blood sugar and cholesterol levels. It may also help with weight loss and prevent belly problems. If you are younger than 50, it is recommended to get 25 to 38 grams per day. If you are older than 50, it is recommended to get 21 to 30 grams per day. Good sources of fiber include:  Nuts and seeds  Beans, peas, and other legumes  Whole-grain products  Fruits  Vegetables  Smart snacks in the right portion. Do not go too long in between meals. Ask your dietitian when you should have a snack in between your meals. Snack on things like:  Nuts  Vegetables and low-fat dressing  Light popcorn  Low-fat cheese and crackers  1/2 sandwich  What foods should be limited or avoided?   You may need to limit the amount of some foods you eat and  how often you eat them. Foods that should be limited include:  High fat or processed foods like:  Ruiz  Sausage  Hot dogs  Whole-fat dairy products  Potato chips  Foods high in sodium like:  Canned soups  Soy sauce and some salad dressings  Cured meats  Salted snack foods like pretzels  Olives  Fats and oils like:  Margarine  Salad dressing  Gravy  Lard or shortening  Foods high in sugar like:  Candy  Cookies  Cake  Ice cream  Table sugar  Soda  Juice drinks  Starches that are not whole grain like:  White rice  French fries  White pasta  White bread  Sugary cereals  Baked goods, pastries, croissants  Beer, wine, and mixed drinks (alcohol). Drink alcohol in moderation (1 drink per day or less for adult women and 2 drinks per day or less for adult men). Drinking alcohol can cause fluctuations in your blood sugar and interfere with how your diabetic drugs work. Talk to your doctor about how you can safely include alcohol into your diet.  What can be done to prevent this health problem?   Some people have a higher chance of developing diabetes than others. This is a life-long problem. You can still lead a normal life. Diabetes can be managed through diet, exercise, and drugs. It is important to have support from your family and friends to help you with your goals.  When do I need to call the doctor?   Blood sugar level is above 240 mg/dL for more than a day  Blood sugar level drops to less than 40 and does not respond to 15 grams of simple carbohydrate, like 4 glucose tablets or 1/2 cup (120 mL) of fruit juice  Trouble breathing  Very sleepy and trouble concentrating  Feeling very thirsty  Needing to urinate (pee) more than normal  Throw up more than once or have many loose stools  You are so sick you cannot eat or drink  Fever over 101°F (38°C)  Questions about your diet plan  You are not feeling better in 2 to 3 days or you are feeling worse  Helpful tips   Plan ahead. Plan your meals and grocery list before going to  the store. This will help you to choose foods that are good for you.  Pack a lunch. Take healthy meals and snacks with you to work.  Keep a food journal to help keep you on track. Take note of foods that keep your blood sugar in goal range. Also note foods and meals that raise or lower your blood sugar. There are apps for your phone that can help with this.  Visit a restaurant's website before eating out. You can see the menu options and nutritional facts. This way, you can see which items best fit into your diet plan. Call ahead if you have questions.  Last Reviewed Date   2021-03-18  Consumer Information Use and Disclaimer   This information is not specific medical advice and does not replace information you receive from your health care provider. This is only a brief summary of general information. It does NOT include all information about conditions, illnesses, injuries, tests, procedures, treatments, therapies, discharge instructions or life-style choices that may apply to you. You must talk with your health care provider for complete information about your health and treatment options. This information should not be used to decide whether or not to accept your health care providers advice, instructions or recommendations. Only your health care provider has the knowledge and training to provide advice that is right for you.   Copyright   Copyright © 2021 [employer], Inc. and its affiliates and/or licensors. All rights reserved.  DASH Diet   About this topic   DASH stands for Dietary Approaches to Stop Hypertension. The DASH diet may help you lower blood pressure. It may also help keep you from getting high blood pressure. You will eat less fat and more fiber on the DASH diet.  This diet gives you more minerals that fight high blood pressure. Some nutrients in this diet are:  Potassium ? Acts to help you get rid of salt. This may help to lower blood pressure.  Calcium ? Makes blood vessels and muscles work the  right way  Magnesium - Helps blood vessels relax  Fiber ? Helps you feel full. It also helps digestion.  What will the results be?   The DASH diet may help you:  Lower your blood pressure and cholesterol  Lower your risk for cancer, heart disease, heart attack, and stroke. It may also lower your risk for heart failure, kidney stones, and diabetes.  Lose weight or keep a healthy weight  What lifestyle changes are needed?   Add regular exercise to get the most help from this diet.  Try to lower stress. Find ways to relax.  Stop smoking. Avoid secondhand smoke.  Limit alcohol intake.  What changes to diet are needed?   Know about poor eating habits. Then, you can fix them as you work with the program.  This diet encourages fruits and vegetables, whole grains, lean meats, healthy fats, and low-fat or fat-free dairy products.  This diet is lower in saturated fats, trans-fats, cholesterol, added sugars, and sodium.  Who should use this diet?   This eating plan is good for the whole family. It is also good for people with high blood pressure and those at risk for high blood pressure.  What foods are good to eat?   Grains: Try to eat 6 to 8 servings of whole grain, high fiber foods each day. These are bread, cereals, brown rice, or pasta.  Fruits and vegetables: Eat 4 to 5 servings each day. Try to pick many kinds and colors. Fresh or frozen are best. Look for low sodium or salt-free if you choose canned.  Dairy: Try to eat 2 to 3 servings of fat free and low fat milk products each day.  Lean meats, poultry, and seafood: Try to eat 6 servings or less of lean meats, poultry, and seafood each day. Try to choose more low fat or lean meats like chicken and turkey. Eat less red meat. Eat more fish instead.  Nuts, seeds, and legumes (dry beans and peas): Try to eat 4 to 5 servings each week. Try to pick nuts such as almonds and walnuts, sunflower seeds, peanut butter, soy beans, lentils, kidney beans, and split peas.  Fats and  oils: Try to eat 2 to 3 servings of fats and oils each day. Eat good fats found in fish, nuts, and avocados. Try using olive oil or vegetable oils such as canola oil. Other good oils to try are corn, safflower, sunflower, or soybean oils. Use low-sodium and low-fat salad dressing and mayonnaise.  Condiments: Pepper, herbs, spices, vinegar, lemon or lime juices are great for seasoning. Be careful to choose low-sodium or salt-free products if you use broths, soups, or soy sauce.  Sweets: Try to eat less than 5 servings each week. Choose low-fat and trans fat-free desserts. These are things like fruit flavored gelatin, sorbet, jelly beans, melanie crackers, animal crackers, low-fat fig bars, and oleg snaps. Eat fruit to satisfy your desire for sweets.     What foods should be limited or avoided?   Grains: Salted breads, rolls, crackers, quick breads, self-rising flours, biscuit mixes, regular bread crumbs, instant hot cereals, commercially-prepared rice, pasta, stuffing mixes  Fruits and vegetables: Commercially-prepared potatoes and vegetable mixes, regular canned vegetables and juices, vegetables frozen with sauce or pickled vegetables, processed fruits with salt or sodium  Milk: Whole milk, malted milk, chocolate milk, buttermilk, cheese, ice cream  Meats and beans: Smoked, cured, salted, or canned fish; meats or poultry such as watts, sausages, sardines; high-fat cuts of meat like beef, lamb, or pork; chicken with the skin on it  Fats: Cut back on solid fats like butter, lard, and margarine. Eat less food with high saturated fat, cholesterol and total fat.  Condiments and snacks: Salted and canned peas, beans, and olives; salted snack foods; fried foods; soda or other sweetened drinks  Sweets: High-fat baked goods such as muffins, donuts, pastries, commercial baked goods, candy bars  If you choose to drink alcohol, limit the amount you drink. Women should have 1 drink or less per day and men should have 2 drinks  or less per day.  Helpful tips   Avoid eating canned vegetables and processed foods. These have a lot of salt in them. Look for a low-salt or low-sodium choice.  Try baking or broiling instead of frying food.  Write down the foods you eat. This will help you track what you have eaten each week.  When you go to a grocery store, have a list or a meal plan. Do not shop when you are hungry to avoid cravings for foods.  Read food labels with care. They will show you how much is in a serving. The amount is given as a percentage of the total amount you need each day. Reading labels will help you make healthy food choices.       Avoid fast foods.  Talk to your doctor or dietitian to see if you need vitamin and mineral supplements to help you balance your diet.  Talk to a dietitian for help.  Where can I learn more?   Academy of Nutrition and Dietetics  https://www.eatright.org/health/wellness/heart-and-cardiovascular-health/dash-diet-reducing-hypertension-through-diet-and-lifestyle   FamilyDoctor.org  http://familydoctor.org/familydoctor/en/prevention-wellness/food-nutrition/weight-loss/the-dash-diet-healthy-eating-to-control-your-blood-pressure.html   Last Reviewed Date   2021-03-15  Consumer Information Use and Disclaimer   This information is not specific medical advice and does not replace information you receive from your health care provider. This is only a brief summary of general information. It does NOT include all information about conditions, illnesses, injuries, tests, procedures, treatments, therapies, discharge instructions or life-style choices that may apply to you. You must talk with your health care provider for complete information about your health and treatment options. This information should not be used to decide whether or not to accept your health care providers advice, instructions or recommendations. Only your health care provider has the knowledge and training to provide advice that is right for  you.  Copyright   Copyright © 2021 [employer] and its affiliates and/or licensors. All rights reserved.

## 2024-02-29 LAB
ALBUMIN SERPL-MCNC: 4.6 G/DL (ref 3.8–4.8)
ALBUMIN/GLOB SERPL: 1.8 {RATIO} (ref 1.2–2.2)
ALP SERPL-CCNC: 81 IU/L (ref 44–121)
ALT SERPL-CCNC: 9 IU/L (ref 0–32)
AST SERPL-CCNC: 12 IU/L (ref 0–40)
BASOPHILS # BLD AUTO: 0 X10E3/UL (ref 0–0.2)
BASOPHILS NFR BLD AUTO: 1 %
BILIRUB SERPL-MCNC: 0.3 MG/DL (ref 0–1.2)
BUN SERPL-MCNC: 16 MG/DL (ref 8–27)
BUN/CREAT SERPL: 19 (ref 12–28)
CALCIUM SERPL-MCNC: 9.7 MG/DL (ref 8.7–10.3)
CHLORIDE SERPL-SCNC: 97 MMOL/L (ref 96–106)
CO2 SERPL-SCNC: 22 MMOL/L (ref 20–29)
CREAT SERPL-MCNC: 0.85 MG/DL (ref 0.57–1)
EOSINOPHIL # BLD AUTO: 0 X10E3/UL (ref 0–0.4)
EOSINOPHIL NFR BLD AUTO: 0 %
ERYTHROCYTE [DISTWIDTH] IN BLOOD BY AUTOMATED COUNT: 12.7 % (ref 11.7–15.4)
EST. GFR  (NO RACE VARIABLE): 70 ML/MIN/1.73
GLOBULIN SER CALC-MCNC: 2.6 G/DL (ref 1.5–4.5)
GLUCOSE SERPL-MCNC: 297 MG/DL (ref 70–99)
HBA1C MFR BLD: 10.1 % (ref 4.8–5.6)
HCT VFR BLD AUTO: 38.6 % (ref 34–46.6)
HGB BLD-MCNC: 12.7 G/DL (ref 11.1–15.9)
IMM GRANULOCYTES NFR BLD AUTO: 0 %
LYMPHOCYTES # BLD AUTO: 2.3 X10E3/UL (ref 0.7–3.1)
LYMPHOCYTES NFR BLD AUTO: 31 %
MCH RBC QN AUTO: 29.8 PG (ref 26.6–33)
MCHC RBC AUTO-ENTMCNC: 32.9 G/DL (ref 31.5–35.7)
MCV RBC AUTO: 91 FL (ref 79–97)
MONOCYTES # BLD AUTO: 0.4 X10E3/UL (ref 0.1–0.9)
MONOCYTES NFR BLD AUTO: 6 %
NEUTROPHILS # BLD AUTO: 4.5 X10E3/UL (ref 1.4–7)
NEUTROPHILS NFR BLD AUTO: 62 %
PHOSPHATE SERPL-MCNC: 3.2 MG/DL (ref 3–4.3)
PLATELET # BLD AUTO: 294 X10E3/UL (ref 150–450)
POTASSIUM SERPL-SCNC: 4.9 MMOL/L (ref 3.5–5.2)
PROT SERPL-MCNC: 7.2 G/DL (ref 6–8.5)
RBC # BLD AUTO: 4.26 X10E6/UL (ref 3.77–5.28)
SODIUM SERPL-SCNC: 136 MMOL/L (ref 134–144)
WBC # BLD AUTO: 7.3 X10E3/UL (ref 3.4–10.8)

## 2024-02-29 RX ORDER — PIOGLITAZONEHYDROCHLORIDE 30 MG/1
30 TABLET ORAL DAILY
Qty: 90 TABLET | Refills: 3 | Status: SHIPPED | OUTPATIENT
Start: 2024-02-29 | End: 2024-05-28 | Stop reason: DRUGHIGH

## 2024-03-21 ENCOUNTER — TELEPHONE (OUTPATIENT)
Dept: PRIMARY CARE CLINIC | Facility: CLINIC | Age: 79
End: 2024-03-21
Payer: MEDICARE

## 2024-03-21 RX ORDER — CALCIUM CITRATE/VITAMIN D3 200MG-6.25
1 TABLET ORAL DAILY
Qty: 100 STRIP | Refills: 3 | Status: SHIPPED | OUTPATIENT
Start: 2024-03-21 | End: 2024-05-12

## 2024-03-21 RX ORDER — ALENDRONATE SODIUM 70 MG/1
70 TABLET ORAL
Qty: 12 TABLET | Refills: 1 | Status: SHIPPED | OUTPATIENT
Start: 2024-03-21 | End: 2024-04-22 | Stop reason: SDUPTHER

## 2024-03-21 NOTE — TELEPHONE ENCOUNTER
----- Message from Yessy Duenas sent at 3/21/2024  9:58 AM CDT -----  .Type:  RX Refill Request    Who Called: pt  Refill or New Rx:refill  RX Name and Strength:alendronate (FOSAMAX) 70 MG tablet  How is the patient currently taking it? (ex. 1XDay):  Is this a 30 day or 90 day RX:  Preferred Pharmacy with phone number:[facility] - [facility] [address]   Local or Mail Order mail  Ordering Provider:  Would the patient rather a call back or a response via [facility]?   Best Call Back Number:[phone]  Additional Information:  refill request     .Type:  RX Refill Request    Who Called: pt  Refill or New Rx:refill  RX Name and Strength:TRUE METRIX GLUCOSE TEST STRIP Strp  How is the patient currently taking it? (ex. 1XDay):  Is this a 30 day or 90 day RX:  Preferred Pharmacy with phone number:[facility] MAIL DEL[facility] - [place], [place] - [address]  Local or Mail Order:mail  Ordering Provider:  Would the patient rather a call back or a response via [facility]?   Best Call Back Number:[phone]  Additional Information: refill request

## 2024-04-22 ENCOUNTER — TELEPHONE (OUTPATIENT)
Dept: PRIMARY CARE CLINIC | Facility: CLINIC | Age: 79
End: 2024-04-22
Payer: MEDICARE

## 2024-04-22 RX ORDER — ALENDRONATE SODIUM 70 MG/1
70 TABLET ORAL
Qty: 12 TABLET | Refills: 1 | Status: SHIPPED | OUTPATIENT
Start: 2024-04-22

## 2024-04-22 NOTE — TELEPHONE ENCOUNTER
----- Message from Silvina Muniz sent at 4/22/2024  9:35 AM CDT -----  Type:  RX Refill Request    Who Called: Ceta (daughter)     Refill or New Rx: refill     RX Name and Strength: alendronate (FOSAMAX) 70 MG tablet    How is the patient currently taking it? (ex. 1XDay):    Is this a 30 day or 90 day RX:    Preferred Pharmacy with phone number: [facility] [place]     Local or Mail Order:    Ordering Provider:    Would the patient rather a call back or a response via [facility]?     Best Call Back Number: [phone]    Additional Information:

## 2024-04-23 RX ORDER — ALENDRONATE SODIUM 70 MG/1
TABLET ORAL
Qty: 12 TABLET | Refills: 3 | OUTPATIENT
Start: 2024-04-23

## 2024-04-23 RX ORDER — METFORMIN HYDROCHLORIDE 1000 MG/1
1000 TABLET ORAL 2 TIMES DAILY WITH MEALS
Qty: 180 TABLET | Refills: 1 | Status: SHIPPED | OUTPATIENT
Start: 2024-04-23 | End: 2024-05-01 | Stop reason: SDUPTHER

## 2024-05-01 RX ORDER — METFORMIN HYDROCHLORIDE 1000 MG/1
1000 TABLET ORAL 2 TIMES DAILY WITH MEALS
Qty: 180 TABLET | Refills: 1 | Status: SHIPPED | OUTPATIENT
Start: 2024-05-01 | End: 2024-10-28

## 2024-05-12 RX ORDER — CALCIUM CITRATE/VITAMIN D3 200MG-6.25
TABLET ORAL
Qty: 100 STRIP | Refills: 3 | Status: SHIPPED | OUTPATIENT
Start: 2024-05-12

## 2024-05-21 ENCOUNTER — TELEPHONE (OUTPATIENT)
Dept: PRIMARY CARE CLINIC | Facility: CLINIC | Age: 79
End: 2024-05-21
Payer: MEDICARE

## 2024-05-27 NOTE — PROGRESS NOTES
Naz Lugo MD   [id number] [place] [address]     Patient ID: [id number]     Chief Complaint: 3 Months follow up for DM        HPI:     Demetrice Barrett is a 78 y.o. female here today for a follow up of uncontrolled diabetes. Her hypertension is doing well. No other doctor visits. She has not done her eye visit. Her daughter and  are watching she sticks to her diet. No other complaints today.       Subjective:     Review of Systems   Constitutional: Negative.    Respiratory: Negative.     Cardiovascular: Negative.    Gastrointestinal: Negative.        Past Medical History:   Diagnosis Date    Aortic valve sclerosis 7/17/2023    COVID-19 07/08/2020    Diabetes mellitus type II, uncontrolled     HTN (hypertension)     Hypercholesterolemia     Internal hemorrhoids     Obesity     Peripheral vascular disease 7/17/2023    Mild on US 2023    Tubular adenoma of colon         Past Surgical History:   Procedure Laterality Date    COLONOSCOPY N/A 12/04/2020    COLONOSCOPY W/ BIOPSIES AND POLYPECTOMY      TOTAL ABDOMINAL HYSTERECTOMY         Family History   Problem Relation Name Age of Onset    Hyperlipidemia Mother      Hypertension Mother      Kidney disease Mother      Gout Mother      Stroke Sister      Hypertension Sister      Diabetes Sister      Heart disease Sister      Anemia Sister      Kidney disease Sister          Social History     Socioeconomic History    Marital status:    Tobacco Use    Smoking status: Never    Smokeless tobacco: Never   Substance and Sexual Activity    Alcohol use: Never    Drug use: Never    Sexual activity: Not Currently     Social Determinants of Health     Financial Resource Strain: Low Risk  (6/22/2023)    Overall Financial Resource Strain (CARDIA)     Difficulty of Paying Living Expenses: Not hard at all   Food Insecurity: No Food Insecurity (6/22/2023)    Hunger Vital Sign     Worried About Running Out of Food in the Last Year: Never true     Ran Out of Food in the Last  Year: Never true   Transportation Needs: No Transportation Needs (6/22/2023)    PRAPARE - Transportation     Lack of Transportation (Medical): No     Lack of Transportation (Non-Medical): No   Physical Activity: Sufficiently Active (6/22/2023)    Exercise Vital Sign     Days of Exercise per Week: 5 days     Minutes of Exercise per Session: 30 min   Stress: No Stress Concern Present (6/22/2023)    [employer] [place] [employer] - Occupational Stress Questionnaire     Feeling of Stress : Only a little   Housing Stability: Low Risk  (6/22/2023)    Housing Stability Vital Sign     Unable to Pay for Housing in the Last Year: No     Number of Places Lived in the Last Year: 1     Unstable Housing in the Last Year: No       Review of patient's allergies indicates:  No Known Allergies    Outpatient Medications Marked as Taking for the 5/28/24 encounter (Office Visit) with Naz Lugo MD   Medication Sig Dispense Refill    alendronate (FOSAMAX) 70 MG tablet Take 1 tablet (70 mg total) by mouth every 7 days. 12 tablet 1    amLODIPine (NORVASC) 10 MG tablet Take 1 tablet (10 mg total) by mouth once daily. 90 tablet 1    enalapril (VASOTEC) 20 MG tablet TAKE 1 TABLET EVERY DAY 90 tablet 1    metFORMIN (GLUCOPHAGE) 1000 MG tablet Take 1 tablet (1,000 mg total) by mouth 2 (two) times daily with meals. 180 tablet 1    pioglitazone (ACTOS) 30 MG tablet Take 1 tablet (30 mg total) by mouth once daily. 90 tablet 3    rosuvastatin (CRESTOR) 20 MG tablet Take 1 tablet (20 mg total) by mouth once daily. 90 tablet 1    TRUE METRIX GLUCOSE TEST STRIP Strp TEST BLOOD SUGAR EVERY DAY AS DIRECTED FOR DIABETES 100 strip 3    TRUEPLUS LANCETS 33 gauge Misc TEST BLOOD SUGAR TWICE DAILY 200 each 3       Patient Care Team:  Naz Lugo MD as PCP - General (Internal Medicine)  Paty Marcelino MD as Consulting Physician (Neurology)  Love Singh MD as Consulting Physician (Cardiovascular Disease)  #[id number], [facility]  "      Objective:     /66   Pulse 60   Temp 97.8 °F (36.6 °C) (Oral)   Resp 16   Ht 5' 1" (1.549 m)   Wt 68 kg (150 lb)   SpO2 98%   BMI 28.34 kg/m²     Physical Exam  Vitals reviewed.   HENT:      Head: Normocephalic.   Eyes:      General: No scleral icterus.     Extraocular Movements: Extraocular movements intact.      Conjunctiva/sclera: Conjunctivae normal.      Pupils: Pupils are equal, round, and reactive to light.   Cardiovascular:      Rate and Rhythm: Normal rate and regular rhythm.   Pulmonary:      Effort: Pulmonary effort is normal.      Breath sounds: Normal breath sounds.   Skin:     General: Skin is warm and dry.               Assessment/Plan:     1. Uncontrolled type 2 diabetes mellitus with hyperglycemia  Comments:  A1C was over 10, recheck today, she needs refill but not sure of what.  Orders:  -     Hemoglobin A1C    2. Uncontrolled hypertension  Comments:  BP is back under control now, her family is watching her diet, keeping her away from salt    3. Dementia without behavioral disturbance  Comments:  Doing well today, stable.  Overview:  CBC CMP and TSH in 10/2021 unremarkable apart from mildly elevated LFTs, hepatic function panel in 2/2022 normal, A1C in 01/2022 8    MRI brain 10/2021 unremarkable, mild chronic microangiopathy noted    MOCA scores:  08/2022: 19/30- difficulty with language and recall               Follow up in about 3 months (around 8/28/2024) for Uncontrolled Diabetes. In addition to their scheduled follow up, the patient has also been instructed to follow up on as needed basis.     Signature:  Naz Lugo MD  [facility]  [id number] [place] [address]    "

## 2024-05-28 ENCOUNTER — OFFICE VISIT (OUTPATIENT)
Dept: PRIMARY CARE CLINIC | Facility: CLINIC | Age: 79
End: 2024-05-28
Payer: MEDICARE

## 2024-05-28 ENCOUNTER — CLINICAL SUPPORT (OUTPATIENT)
Dept: PRIMARY CARE CLINIC | Facility: CLINIC | Age: 79
End: 2024-05-28
Attending: INTERNAL MEDICINE
Payer: MEDICARE

## 2024-05-28 VITALS
SYSTOLIC BLOOD PRESSURE: 129 MMHG | HEART RATE: 60 BPM | RESPIRATION RATE: 16 BRPM | WEIGHT: 150 LBS | BODY MASS INDEX: 28.32 KG/M2 | OXYGEN SATURATION: 98 % | HEIGHT: 61 IN | TEMPERATURE: 98 F | DIASTOLIC BLOOD PRESSURE: 66 MMHG

## 2024-05-28 DIAGNOSIS — E11.65 UNCONTROLLED TYPE 2 DIABETES MELLITUS WITH HYPERGLYCEMIA: Primary | ICD-10-CM

## 2024-05-28 DIAGNOSIS — F03.90 DEMENTIA WITHOUT BEHAVIORAL DISTURBANCE: ICD-10-CM

## 2024-05-28 DIAGNOSIS — E11.65 UNCONTROLLED TYPE 2 DIABETES MELLITUS WITH HYPERGLYCEMIA: ICD-10-CM

## 2024-05-28 DIAGNOSIS — I10 UNCONTROLLED HYPERTENSION: ICD-10-CM

## 2024-05-28 LAB
EST. AVERAGE GLUCOSE BLD GHB EST-MCNC: 188.6 MG/DL
HBA1C MFR BLD: 8.2 %

## 2024-05-28 PROCEDURE — 1125F AMNT PAIN NOTED PAIN PRSNT: CPT | Mod: CPTII,,, | Performed by: INTERNAL MEDICINE

## 2024-05-28 PROCEDURE — 1159F MED LIST DOCD IN RCRD: CPT | Mod: CPTII,,, | Performed by: INTERNAL MEDICINE

## 2024-05-28 PROCEDURE — 99214 OFFICE O/P EST MOD 30 MIN: CPT | Mod: ,,, | Performed by: INTERNAL MEDICINE

## 2024-05-28 PROCEDURE — 3078F DIAST BP <80 MM HG: CPT | Mod: CPTII,,, | Performed by: INTERNAL MEDICINE

## 2024-05-28 PROCEDURE — 3074F SYST BP LT 130 MM HG: CPT | Mod: CPTII,,, | Performed by: INTERNAL MEDICINE

## 2024-05-28 PROCEDURE — 36415 COLL VENOUS BLD VENIPUNCTURE: CPT | Performed by: INTERNAL MEDICINE

## 2024-05-28 PROCEDURE — 92228 IMG RTA DETC/MNTR DS PHY/QHP: CPT | Mod: TC,,, | Performed by: INTERNAL MEDICINE

## 2024-05-28 PROCEDURE — 2025F 7 FLD RTA PHOTO W/O RTNOPTHY: CPT | Mod: CPTII,,, | Performed by: OPHTHALMOLOGY

## 2024-05-28 PROCEDURE — 1160F RVW MEDS BY RX/DR IN RCRD: CPT | Mod: CPTII,,, | Performed by: INTERNAL MEDICINE

## 2024-05-28 PROCEDURE — 83036 HEMOGLOBIN GLYCOSYLATED A1C: CPT | Performed by: INTERNAL MEDICINE

## 2024-05-28 PROCEDURE — 92228 IMG RTA DETC/MNTR DS PHY/QHP: CPT | Mod: 26,,, | Performed by: OPHTHALMOLOGY

## 2024-05-28 PROCEDURE — 1101F PT FALLS ASSESS-DOCD LE1/YR: CPT | Mod: CPTII,,, | Performed by: INTERNAL MEDICINE

## 2024-05-28 PROCEDURE — 3288F FALL RISK ASSESSMENT DOCD: CPT | Mod: CPTII,,, | Performed by: INTERNAL MEDICINE

## 2024-05-28 RX ORDER — PIOGLITAZONEHYDROCHLORIDE 45 MG/1
45 TABLET ORAL DAILY
Qty: 90 TABLET | Refills: 3 | Status: SHIPPED | OUTPATIENT
Start: 2024-05-28

## 2024-05-28 NOTE — PROGRESS NOTES
Demetrice Barrett is a 78 y.o. female here for a diabetic eye screening with non-dilated fundus photos per Dr. Lugo.    Patient cooperative?: Yes  Small pupils?: No  Last eye exam:     For exam results, see Encounter Report.

## 2024-05-29 ENCOUNTER — TELEPHONE (OUTPATIENT)
Dept: PRIMARY CARE CLINIC | Facility: CLINIC | Age: 79
End: 2024-05-29
Payer: MEDICARE

## 2024-05-29 NOTE — TELEPHONE ENCOUNTER
----- Message from Naz Lugo MD sent at 5/29/2024  5:36 PM CDT -----  Eye came through, no sign of damage in eye from diabetes. Recheck 1 year

## 2024-08-27 ENCOUNTER — TELEPHONE (OUTPATIENT)
Dept: PRIMARY CARE CLINIC | Facility: CLINIC | Age: 79
End: 2024-08-27
Payer: MEDICARE

## 2024-08-27 RX ORDER — ALENDRONATE SODIUM 70 MG/1
70 TABLET ORAL
Qty: 12 TABLET | Refills: 1 | Status: SHIPPED | OUTPATIENT
Start: 2024-08-27

## 2024-08-27 NOTE — TELEPHONE ENCOUNTER
----- Message from Nilda Panda sent at 8/27/2024 12:08 PM CDT -----  Regarding: med refill  .Who Called: Demetrice Barrett    Refill or New Rx:Refill  RX Name and Strength:alendronate (FOSAMAX) 70 MG tablet  How is the patient currently taking it? (ex. 1XDay):7 days  Is this a 30 day or 90 day RX:12  Local or Mail Order:mail  List of preferred pharmacies on file (remove unneeded):[facility] - [address]   Phone: [phone]  Fax: [phone]        Ordering Provider:Parish      Preferred Method of Contact: Phone Call  Patient's Preferred Phone Number on File: [phone]   Best Call Back Number, if different:  Additional Information: pt needs authorization from  to refill prescription

## 2024-11-07 ENCOUNTER — TELEPHONE (OUTPATIENT)
Dept: PRIMARY CARE CLINIC | Facility: CLINIC | Age: 79
End: 2024-11-07
Payer: MEDICARE

## 2024-11-12 ENCOUNTER — TELEPHONE (OUTPATIENT)
Dept: PRIMARY CARE CLINIC | Facility: CLINIC | Age: 79
End: 2024-11-12

## 2024-11-12 NOTE — TELEPHONE ENCOUNTER
I spoke with the patients daughter and she is changing doctors. Will no longer come and see Dr. Lugo. Dr. Colón is where she is going.

## 2025-03-31 RX ORDER — METFORMIN HYDROCHLORIDE 1000 MG/1
TABLET ORAL
Refills: 0 | OUTPATIENT
Start: 2025-03-31

## 2025-03-31 RX ORDER — AMLODIPINE BESYLATE 10 MG/1
TABLET ORAL
Refills: 0 | OUTPATIENT
Start: 2025-03-31

## 2025-03-31 RX ORDER — ROSUVASTATIN CALCIUM 20 MG/1
TABLET, COATED ORAL
Refills: 0 | OUTPATIENT
Start: 2025-03-31

## 2025-03-31 RX ORDER — ESCITALOPRAM OXALATE 5 MG/1
TABLET ORAL
Refills: 0 | OUTPATIENT
Start: 2025-03-31

## 2025-03-31 RX ORDER — ENALAPRIL MALEATE 10 MG/1
TABLET ORAL
Refills: 0 | OUTPATIENT
Start: 2025-03-31

## 2025-03-31 RX ORDER — PIOGLITAZONEHYDROCHLORIDE 45 MG/1
TABLET ORAL
Refills: 0 | OUTPATIENT
Start: 2025-03-31

## 2025-04-03 ENCOUNTER — TELEPHONE (OUTPATIENT)
Dept: PRIMARY CARE CLINIC | Facility: CLINIC | Age: 80
End: 2025-04-03
Payer: MEDICARE

## 2025-05-26 ENCOUNTER — OFFICE VISIT (OUTPATIENT)
Dept: PRIMARY CARE CLINIC | Facility: CLINIC | Age: 80
End: 2025-05-26
Payer: MEDICARE

## 2025-05-26 ENCOUNTER — TELEPHONE (OUTPATIENT)
Dept: PRIMARY CARE CLINIC | Facility: CLINIC | Age: 80
End: 2025-05-26

## 2025-05-26 VITALS
WEIGHT: 166.19 LBS | BODY MASS INDEX: 31.38 KG/M2 | TEMPERATURE: 98 F | SYSTOLIC BLOOD PRESSURE: 138 MMHG | DIASTOLIC BLOOD PRESSURE: 85 MMHG | RESPIRATION RATE: 16 BRPM | HEART RATE: 63 BPM | OXYGEN SATURATION: 97 % | HEIGHT: 61 IN

## 2025-05-26 DIAGNOSIS — N39.0 RECURRENT UTI: ICD-10-CM

## 2025-05-26 DIAGNOSIS — E11.40 TYPE 2 DIABETES MELLITUS WITH DIABETIC NEUROPATHY, WITHOUT LONG-TERM CURRENT USE OF INSULIN: ICD-10-CM

## 2025-05-26 DIAGNOSIS — E11.65 UNCONTROLLED TYPE 2 DIABETES MELLITUS WITH HYPERGLYCEMIA: ICD-10-CM

## 2025-05-26 DIAGNOSIS — I73.9 PERIPHERAL VASCULAR DISEASE: ICD-10-CM

## 2025-05-26 DIAGNOSIS — I35.1 AORTIC VALVE INSUFFICIENCY, ETIOLOGY OF CARDIAC VALVE DISEASE UNSPECIFIED: ICD-10-CM

## 2025-05-26 DIAGNOSIS — M81.0 AGE-RELATED OSTEOPOROSIS WITHOUT CURRENT PATHOLOGICAL FRACTURE: ICD-10-CM

## 2025-05-26 DIAGNOSIS — E78.00 HYPERCHOLESTEROLEMIA: ICD-10-CM

## 2025-05-26 DIAGNOSIS — I50.30 CONGESTIVE HEART FAILURE WITH LV DIASTOLIC DYSFUNCTION, NYHA CLASS 1: ICD-10-CM

## 2025-05-26 DIAGNOSIS — Z00.00 MEDICARE ANNUAL WELLNESS VISIT, SUBSEQUENT: Primary | ICD-10-CM

## 2025-05-26 DIAGNOSIS — F03.90 DEMENTIA WITHOUT BEHAVIORAL DISTURBANCE: ICD-10-CM

## 2025-05-26 DIAGNOSIS — I10 ESSENTIAL HYPERTENSION: ICD-10-CM

## 2025-05-26 PROBLEM — F05 DELIRIUM DUE TO ANOTHER MEDICAL CONDITION: Status: RESOLVED | Noted: 2023-11-25 | Resolved: 2025-05-26

## 2025-05-26 PROBLEM — G93.41 ENCEPHALOPATHY, METABOLIC: Status: RESOLVED | Noted: 2023-11-27 | Resolved: 2025-05-26

## 2025-05-26 RX ORDER — AMLODIPINE BESYLATE 10 MG/1
10 TABLET ORAL DAILY
Qty: 90 TABLET | Refills: 1 | Status: SHIPPED | OUTPATIENT
Start: 2025-05-26

## 2025-05-26 RX ORDER — ALENDRONATE SODIUM 70 MG/1
70 TABLET ORAL
Qty: 12 TABLET | Refills: 1 | Status: SHIPPED | OUTPATIENT
Start: 2025-05-26

## 2025-05-26 RX ORDER — CALCIUM CITRATE/VITAMIN D3 200MG-6.25
1 TABLET ORAL DAILY
Qty: 100 STRIP | Refills: 3 | Status: SHIPPED | OUTPATIENT
Start: 2025-05-26

## 2025-05-26 RX ORDER — ROSUVASTATIN CALCIUM 20 MG/1
20 TABLET, COATED ORAL DAILY
Qty: 90 TABLET | Refills: 1 | Status: SHIPPED | OUTPATIENT
Start: 2025-05-26

## 2025-05-26 RX ORDER — PIOGLITAZONE 45 MG/1
45 TABLET ORAL DAILY
Qty: 90 TABLET | Refills: 3 | Status: SHIPPED | OUTPATIENT
Start: 2025-05-26

## 2025-05-26 RX ORDER — METFORMIN HYDROCHLORIDE 1000 MG/1
1000 TABLET ORAL 2 TIMES DAILY WITH MEALS
Qty: 180 TABLET | Refills: 1 | Status: SHIPPED | OUTPATIENT
Start: 2025-05-26 | End: 2025-11-22

## 2025-05-26 RX ORDER — ENALAPRIL MALEATE 20 MG/1
20 TABLET ORAL DAILY
Qty: 90 TABLET | Refills: 1 | Status: SHIPPED | OUTPATIENT
Start: 2025-05-26

## 2025-05-26 RX ORDER — LANCETS 33 GAUGE
1 EACH MISCELLANEOUS DAILY
Qty: 200 EACH | Refills: 3 | Status: SHIPPED | OUTPATIENT
Start: 2025-05-26

## 2025-05-26 NOTE — TELEPHONE ENCOUNTER
Copied from CRM #[id number]. Topic: General Inquiry - Return Call  >> May 26, 2025 11:47 AM Ivet wrote:  .Who Called: Ceta-granddaughter     Patient is returning phone call    Who Left Message for Patient:  Does the patient know what this is regarding?:stated she was just in to see doctor and wants to know what her sugar level are--pls call pt back      Preferred Method of Contact: Phone Call  Patient's Preferred Phone Number on File: [phone]   Best Call Back Number, if different:   Additional Information:

## 2025-05-26 NOTE — PROGRESS NOTES
Patient ID: [id number]     Chief Complaint: Medicare AWV      HPI:     Demetrice Barrett is a 79 y.o. female here today for a Medicare Wellness. She doesn't know about Dr Colón. He is not on her medicare plan. She will still take IV therapy every week with Dr Colón. It is vitamins and fluids. She reports she is still on the same medications he just added a mild antidepressant.  She is here with her granddaughter.       Opioid Screening: Patient medication list reviewed, patient is not taking prescription opioids. Patient is not using additional opioids than prescribed. Patient is at low risk of substance abuse based on this opioid use history.       Past Medical History:   Diagnosis Date    Aortic valve sclerosis 07/17/2023    COVID-19 07/08/2020    Delirium due to another medical condition 11/25/2023    Diabetes mellitus type II, uncontrolled     Encephalopathy, metabolic 11/27/2023    HTN (hypertension)     Hypercholesterolemia     Internal hemorrhoids     Obesity     Peripheral vascular disease 07/17/2023    Mild on US 2023    Tubular adenoma of colon         Past Surgical History:   Procedure Laterality Date    COLONOSCOPY N/A 12/04/2020    COLONOSCOPY W/ BIOPSIES AND POLYPECTOMY      TOTAL ABDOMINAL HYSTERECTOMY         Review of patient's allergies indicates:  No Known Allergies    Outpatient Medications Marked as Taking for the 5/26/25 encounter (Office Visit) with Naz Lugo MD   Medication Sig Dispense Refill    [DISCONTINUED] alendronate (FOSAMAX) 70 MG tablet Take 1 tablet (70 mg total) by mouth every 7 days. 12 tablet 1    [DISCONTINUED] amLODIPine (NORVASC) 10 MG tablet Take 1 tablet (10 mg total) by mouth once daily. 90 tablet 1    [DISCONTINUED] enalapril (VASOTEC) 20 MG tablet TAKE 1 TABLET EVERY DAY 90 tablet 1    [DISCONTINUED] metFORMIN (GLUCOPHAGE) 1000 MG tablet Take 1 tablet (1,000 mg total) by mouth 2 (two) times daily with meals. 180 tablet 1    [DISCONTINUED] pioglitazone (ACTOS) 45 MG  "tablet Take 1 tablet (45 mg total) by mouth once daily. 90 tablet 3    [DISCONTINUED] rosuvastatin (CRESTOR) 20 MG tablet Take 1 tablet (20 mg total) by mouth once daily. 90 tablet 1    [DISCONTINUED] TRUE METRIX GLUCOSE TEST STRIP Strp TEST BLOOD SUGAR EVERY DAY AS DIRECTED FOR DIABETES 100 strip 3    [DISCONTINUED] TRUEPLUS LANCETS 33 gauge Misc TEST BLOOD SUGAR TWICE DAILY 200 each 3       Social History[1]     Family History   Problem Relation Name Age of Onset    Hyperlipidemia Mother      Hypertension Mother      Kidney disease Mother      Gout Mother      Stroke Sister      Hypertension Sister      Diabetes Sister      Heart disease Sister      Anemia Sister      Kidney disease Sister          Patient Care Team:  Sunday Colón MD as PCP - General (Internal Medicine)  Paty Marcelino MD as Consulting Physician (Neurology)  Love Singh MD as Consulting Physician (Cardiovascular Disease)  #[id number], [facility]       Subjective:     Review of Systems   Constitutional: Negative.    HENT: Negative.     Eyes: Negative.    Respiratory: Negative.     Cardiovascular: Negative.    Gastrointestinal: Negative.    Genitourinary: Negative.    Musculoskeletal:  Positive for joint pain.        R knee   Skin: Negative.    Neurological:         Still forgetful.    Endo/Heme/Allergies:         She needs test strips too,  she says sugars are ok but did not bring a log   Psychiatric/Behavioral:          She doesn't recall the name, she's not sure she's depressed.            Objective:     /85   Pulse 63   Temp 97.7 °F (36.5 °C) (Oral)   Resp 16   Ht 5' 1" (1.549 m)   Wt 75.4 kg (166 lb 3.2 oz)   SpO2 97%   BMI 31.40 kg/m²     Physical Exam  Vitals reviewed.   Constitutional:       Appearance: She is obese. She is not ill-appearing.   HENT:      Head: Normocephalic and atraumatic.      Right Ear: Tympanic membrane, ear canal and external ear normal.      Left Ear: Tympanic membrane, ear canal and " external ear normal.      Mouth/Throat:      Mouth: Mucous membranes are moist.      Pharynx: No oropharyngeal exudate.   Eyes:      General: No scleral icterus.     Extraocular Movements: Extraocular movements intact.      Conjunctiva/sclera: Conjunctivae normal.      Pupils: Pupils are equal, round, and reactive to light.   Cardiovascular:      Rate and Rhythm: Normal rate and regular rhythm.      Pulses:           Dorsalis pedis pulses are 3+ on the right side and 3+ on the left side.        Posterior tibial pulses are 2+ on the right side and 2+ on the left side.      Heart sounds: Normal heart sounds.   Pulmonary:      Effort: Pulmonary effort is normal.      Breath sounds: No wheezing or rales.   Abdominal:      General: Abdomen is flat. Bowel sounds are normal.      Tenderness: There is no abdominal tenderness. There is no guarding.   Musculoskeletal:         General: Tenderness present. Normal range of motion.      Cervical back: Normal range of motion.      Right foot: Normal range of motion.      Left foot: Normal range of motion.   Feet:      Right foot:      Protective Sensation: 10 sites tested.  10 sites sensed.      Skin integrity: Skin integrity normal.      Toenail Condition: Right toenails are normal.      Left foot:      Protective Sensation: 10 sites tested.  10 sites sensed.      Skin integrity: Skin integrity normal.      Toenail Condition: Left toenails are normal.   Lymphadenopathy:      Cervical: No cervical adenopathy.   Skin:     General: Skin is warm and dry.      Findings: No bruising.   Neurological:      General: No focal deficit present.      Mental Status: She is alert. Mental status is at baseline.      Motor: No weakness.      Gait: Gait normal.      Comments: Poor memory for medications when last lab done, when last saw Dr Colón   Psychiatric:         Mood and Affect: Mood normal.         Behavior: Behavior normal.         Thought Content: Thought content normal.         Judgment:  Judgment normal.           A comprehensive HEALTH RISK ASSESSMENT was completed today. Results are summarized below:    There are NO EMOTIONAL/SOCIAL CONCERNS identified on today's screening for Social Isolation, Depression and Anxiety.    There are NO COGNITIVE FUNCTION CONCERNS identified on today's screening.  The following FUNCTIONAL AND/OR SAFETY CONCERNS were identified on today's screening for Physical Symptoms, Nutritional, Home Safety/Living Situation, Fall Risk, Activities of Daily Living, Independent Activities of Daily Living, Physical Activity,Timed Up and Go test and Whisper test::  *Patient reports recent HEARING/VISION DIFFICULTIES. (Have you noticed any hearing or vision difficulties?: (!) Yes)  *Patient reports NO ROUTINE EXERCISE. (On average, how many days per week do you engage in moderate to strenuous exercise (like a brisk walk)?: (!) 0)  *Patient reports she NEEDS ASSISTANCE WITH SOME INDEPENDENT ACTIVITIES OF DAILY LIVING. (Do you need help with phone, transportation, shopping, preparing meals, housework, laundry, meds, or managing money?: (!) Yes)  *Patient reports NO PREVENTIVE HOME HAZARD EVALUATION OR MODIFICATION. (Have you or someone else evaluated or modified your home with additional safety features like handrails on all stairs, installed grab bars in the bathroom, secured loose rugs and ensured good lighting in all areas?: (!) No)    The patient reports NO OPIOID PRESCRIPTIONS. This was confirmed through medication reconciliation.    The patient is NOT A TOBACCO USER.  The patient reports NO SIGNIFICANT ALCOHOL USE.     All Questions regarding food, transportation or housing were not answered today.    Assessment/Plan:     1. Medicare annual wellness visit, subsequent  Comments:  She is unsure if she did her annual with Dr Colón.    2. Uncontrolled type 2 diabetes mellitus with hyperglycemia  Comments:  She does not recall her last A1C wtih Dr Colón, she's not sure if he did any  lab.  Orders:  -     TRUE METRIX GLUCOSE TEST STRIP Strp; Apply 1 strip topically Daily.  Dispense: 100 strip; Refill: 3  -     lancets (TRUEPLUS LANCETS) 33 gauge Misc; Apply 1 lancet  topically once daily. Test Blood Sugar  Dispense: 200 each; Refill: 3  -     Comprehensive Metabolic Panel  -     Hemoglobin A1C  -     Lipid Panel  -     TSH  -     Microalbumin/Creatinine Ratio, Urine  -     Urinalysis, Reflex to Urine Culture    3. Type 2 diabetes mellitus with diabetic neuropathy, without long-term current use of insulin  Comments:  Stable  Orders:  -     metFORMIN (GLUCOPHAGE) 1000 MG tablet; Take 1 tablet (1,000 mg total) by mouth 2 (two) times daily with meals.  Dispense: 180 tablet; Refill: 1  -     pioglitazone (ACTOS) 45 MG tablet; Take 1 tablet (45 mg total) by mouth once daily.  Dispense: 90 tablet; Refill: 3    4. Age-related osteoporosis without current pathological fracture  Comments:  She says she is still on Fosamax  Orders:  -     alendronate (FOSAMAX) 70 MG tablet; Take 1 tablet (70 mg total) by mouth every 7 days.  Dispense: 12 tablet; Refill: 1    5. Essential hypertension  Comments:  Continue amlodipine 10 mg and enalapril 20 mg daily  Orders:  -     amLODIPine (NORVASC) 10 MG tablet; Take 1 tablet (10 mg total) by mouth once daily.  Dispense: 90 tablet; Refill: 1  -     enalapril (VASOTEC) 20 MG tablet; Take 1 tablet (20 mg total) by mouth once daily.  Dispense: 90 tablet; Refill: 1  -     Lipid Panel    6. Hypercholesterolemia  Comments:  Continue rosuvastatin 20 mg  Orders:  -     rosuvastatin (CRESTOR) 20 MG tablet; Take 1 tablet (20 mg total) by mouth once daily.  Dispense: 90 tablet; Refill: 1    7. Peripheral vascular disease  Comments:  Venous, good pulses today  Overview:  Mild on US 2023      8. Congestive heart failure with LV diastolic dysfunction, NYHA class 1  Comments:  No recent issues, not on any diuretic  Overview:  ECHO in hospital 10/22 showed grade 1 with 55% EF.      Orders:  -     CBC Auto Differential    9. Aortic valve insufficiency, etiology of cardiac valve disease unspecified  Comments:  stable, denies any chest pain or palpitations  Overview:  Mild on ECHO 7/14/23      10. Dementia without behavioral disturbance  Comments:  Stable  Overview:  CBC CMP and TSH in 10/2021 unremarkable apart from mildly elevated LFTs, hepatic function panel in 2/2022 normal, A1C in 01/2022 8    MRI brain 10/2021 unremarkable, mild chronic microangiopathy noted    MOCA scores:  08/2022: 19/30- difficulty with language and recall    Orders:  -     CBC Auto Differential  -     TSH    11. Recurrent UTI  -     Urinalysis, Reflex to Urine Culture           Medicare Annual Wellness and Personalized Prevention Plan:   Fall Risk + Home Safety + Hearing Impairment + Depression Screen + Opioid and Substance Abuse Screening + Cognitive Impairment Screen + Health Risk Assessment all reviewed.     Health Maintenance Topics with due status: Not Due       Topic Last Completion Date    Influenza Vaccine 10/20/2010    DEXA Scan 12/27/2022      The patient's Health Maintenance was reviewed and the following appears to be due at this time:   Health Maintenance Due   Topic Date Due    TETANUS VACCINE  Never done    Hemoglobin A1c  08/28/2024    Diabetes Urine Screening  11/06/2024    Foot Exam  11/06/2024    Lipid Panel  11/06/2024    Diabetic Eye Exam  05/29/2025       Advance Care Planning   I attest to discussing Advance Care Planning with patient and/or family member.  Education was provided including the importance of the Health Care Power of , Advance Directives, and/or [place] documentation.  The patient expressed understanding to the importance of this information and discussion.     Advance Care Planning     Date: 05/26/2025  Patient did not wish or was not able to name a surrogate decision maker or provide an Advance Care Plan.       Doesn't want to come in 3 months even if sugar is not at  goal.     Follow up in about 6 months (around 11/26/2025). In addition to their scheduled follow up, the patient has also been instructed to follow up on as needed basis.            [1]   Social History  Socioeconomic History    Marital status:    Tobacco Use    Smoking status: Never    Smokeless tobacco: Never   Substance and Sexual Activity    Alcohol use: Never    Drug use: Never    Sexual activity: Not Currently     Social Drivers of Health     Financial Resource Strain: Low Risk  (5/26/2025)    Overall Financial Resource Strain (CARDIA)     Difficulty of Paying Living Expenses: Not hard at all   Food Insecurity: No Food Insecurity (5/26/2025)    Hunger Vital Sign     Worried About Running Out of Food in the Last Year: Never true     Ran Out of Food in the Last Year: Never true   Transportation Needs: No Transportation Needs (5/26/2025)    PRAPARE - Transportation     Lack of Transportation (Medical): No     Lack of Transportation (Non-Medical): No   Physical Activity: Inactive (5/26/2025)    Exercise Vital Sign     Days of Exercise per Week: 0 days     Minutes of Exercise per Session: 0 min   Stress: No Stress Concern Present (5/26/2025)    [employer] [place] [employer] - Occupational Stress Questionnaire     Feeling of Stress : Not at all   Housing Stability: Low Risk  (5/26/2025)    Housing Stability Vital Sign     Unable to Pay for Housing in the Last Year: No     Number of Times Moved in the Last Year: 0     Homeless in the Last Year: No

## 2025-05-27 ENCOUNTER — RESULTS FOLLOW-UP (OUTPATIENT)
Dept: PRIMARY CARE CLINIC | Facility: CLINIC | Age: 80
End: 2025-05-27

## 2025-05-27 LAB
ALBUMIN SERPL-MCNC: 4.5 G/DL (ref 3.8–4.8)
ALP SERPL-CCNC: 65 IU/L (ref 44–121)
ALT SERPL-CCNC: 8 IU/L (ref 0–32)
AST SERPL-CCNC: 12 IU/L (ref 0–40)
BASOPHILS # BLD AUTO: 0 X10E3/UL (ref 0–0.2)
BASOPHILS NFR BLD AUTO: 0 %
BILIRUB SERPL-MCNC: 0.3 MG/DL (ref 0–1.2)
BUN SERPL-MCNC: 16 MG/DL (ref 8–27)
BUN/CREAT SERPL: 19 (ref 12–28)
CALCIUM SERPL-MCNC: 9.4 MG/DL (ref 8.7–10.3)
CHLORIDE SERPL-SCNC: 104 MMOL/L (ref 96–106)
CHOLEST SERPL-MCNC: 216 MG/DL (ref 100–199)
CO2 SERPL-SCNC: 25 MMOL/L (ref 20–29)
CREAT SERPL-MCNC: 0.86 MG/DL (ref 0.57–1)
EOSINOPHIL # BLD AUTO: 0.1 X10E3/UL (ref 0–0.4)
EOSINOPHIL NFR BLD AUTO: 1 %
ERYTHROCYTE [DISTWIDTH] IN BLOOD BY AUTOMATED COUNT: 13.4 % (ref 11.7–15.4)
EST. GFR  (NO RACE VARIABLE): 69 ML/MIN/1.73
GLOBULIN SER CALC-MCNC: 2.3 G/DL (ref 1.5–4.5)
GLUCOSE SERPL-MCNC: 198 MG/DL (ref 70–99)
HBA1C MFR BLD: 10 % (ref 4.8–5.6)
HCT VFR BLD AUTO: 36.8 % (ref 34–46.6)
HDLC SERPL-MCNC: 53 MG/DL
HGB BLD-MCNC: 11.8 G/DL (ref 11.1–15.9)
IMM GRANULOCYTES NFR BLD AUTO: 0 %
LDLC SERPL CALC-MCNC: 144 MG/DL (ref 0–99)
LYMPHOCYTES # BLD AUTO: 2.1 X10E3/UL (ref 0.7–3.1)
LYMPHOCYTES NFR BLD AUTO: 30 %
MCH RBC QN AUTO: 29.4 PG (ref 26.6–33)
MCHC RBC AUTO-ENTMCNC: 32.1 G/DL (ref 31.5–35.7)
MCV RBC AUTO: 92 FL (ref 79–97)
MONOCYTES # BLD AUTO: 0.4 X10E3/UL (ref 0.1–0.9)
MONOCYTES NFR BLD AUTO: 6 %
NEUTROPHILS # BLD AUTO: 4.4 X10E3/UL (ref 1.4–7)
NEUTROPHILS NFR BLD AUTO: 63 %
PLATELET # BLD AUTO: 269 X10E3/UL (ref 150–450)
POTASSIUM SERPL-SCNC: 4.5 MMOL/L (ref 3.5–5.2)
PROT SERPL-MCNC: 6.8 G/DL (ref 6–8.5)
RBC # BLD AUTO: 4.01 X10E6/UL (ref 3.77–5.28)
SODIUM SERPL-SCNC: 140 MMOL/L (ref 134–144)
TRIGL SERPL-MCNC: 109 MG/DL (ref 0–149)
TSH SERPL DL<=0.005 MIU/L-ACNC: 2.91 UIU/ML (ref 0.45–4.5)
VLDLC SERPL CALC-MCNC: 19 MG/DL (ref 5–40)
WBC # BLD AUTO: 7 X10E3/UL (ref 3.4–10.8)

## 2025-05-28 ENCOUNTER — TELEPHONE (OUTPATIENT)
Dept: PRIMARY CARE CLINIC | Facility: CLINIC | Age: 80
End: 2025-05-28
Payer: MEDICARE

## 2025-05-28 LAB
ALBUMIN/CREAT UR: 9 MG/G CREAT (ref 0–29)
APPEARANCE UR: CLEAR
BACTERIA #/AREA URNS HPF: ABNORMAL /[HPF]
BACTERIA UR CULT: NORMAL
BACTERIA UR CULT: NORMAL
BILIRUB UR QL STRIP: NEGATIVE
COLOR UR: YELLOW
CREAT UR-MCNC: 149.4 MG/DL
CRYSTALS URNS MICRO: ABNORMAL
EPI CELLS #/AREA URNS HPF: >10 /HPF (ref 0–10)
GLUCOSE UR QL STRIP: NEGATIVE
HGB UR QL STRIP: NEGATIVE
KETONES UR QL STRIP: ABNORMAL
LEUKOCYTE ESTERASE UR QL STRIP: ABNORMAL
MICRO URNS: ABNORMAL
MICROALBUMIN UR-MCNC: 13.3 UG/ML
NITRITE UR QL STRIP: NEGATIVE
PH UR STRIP: 5.5 [PH] (ref 5–7.5)
PROT UR QL STRIP: ABNORMAL
RBC #/AREA URNS HPF: ABNORMAL /HPF (ref 0–2)
SP GR UR STRIP: 1.02 (ref 1–1.03)
URINALYSIS REFLEX: ABNORMAL
UROBILINOGEN UR STRIP-MCNC: 0.2 MG/DL (ref 0.2–1)
WBC #/AREA URNS HPF: ABNORMAL /HPF (ref 0–5)

## 2025-05-28 RX ORDER — SEMAGLUTIDE 0.68 MG/ML
0.25 INJECTION, SOLUTION SUBCUTANEOUS
Qty: 9 ML | Refills: 1 | Status: SHIPPED | OUTPATIENT
Start: 2025-05-28

## 2025-05-28 NOTE — TELEPHONE ENCOUNTER
Result given to daughter states she is off her diet agree on starting weekly shot also gave 3 months appointment.

## 2025-05-28 NOTE — TELEPHONE ENCOUNTER
----- Message from Naz Lugo MD sent at 5/27/2025  6:14 PM CDT -----  Her A1C is darlene high. So her diabetes is way out of control.  Would she like to try the weekly injections? They can help loose a little weight. The kidney and liver look ok. The bad cholesterol is   higher than a year ago. The blood count is ok, lower than last year but not anemic. The thyroid is higher than last year but still ok. She really needs to come back in 3 months to recheck sugars. If   she doesn't want to try the weekly injection I can try adding back glimepiride but those had dropped her too far the last time we did them.   ----- Message -----  From: Devaughn Lima  Sent: 5/27/2025  10:11 AM CDT  To: Naz Lugo MD

## 2025-08-28 ENCOUNTER — TELEPHONE (OUTPATIENT)
Dept: PRIMARY CARE CLINIC | Facility: CLINIC | Age: 80
End: 2025-08-28

## 2025-09-02 ENCOUNTER — TELEPHONE (OUTPATIENT)
Dept: PRIMARY CARE CLINIC | Facility: CLINIC | Age: 80
End: 2025-09-02

## 2025-09-04 ENCOUNTER — OFFICE VISIT (OUTPATIENT)
Dept: PRIMARY CARE CLINIC | Facility: CLINIC | Age: 80
End: 2025-09-04
Payer: MEDICARE

## 2025-09-04 VITALS
DIASTOLIC BLOOD PRESSURE: 72 MMHG | WEIGHT: 165.38 LBS | RESPIRATION RATE: 16 BRPM | HEART RATE: 77 BPM | SYSTOLIC BLOOD PRESSURE: 125 MMHG | BODY MASS INDEX: 31.23 KG/M2 | TEMPERATURE: 98 F | OXYGEN SATURATION: 97 % | HEIGHT: 61 IN

## 2025-09-04 DIAGNOSIS — E11.65 UNCONTROLLED TYPE 2 DIABETES MELLITUS WITH HYPERGLYCEMIA: Primary | ICD-10-CM

## 2025-09-04 DIAGNOSIS — E11.649 TYPE 2 DIABETES MELLITUS WITH HYPOGLYCEMIA WITHOUT COMA, WITHOUT LONG-TERM CURRENT USE OF INSULIN: ICD-10-CM

## 2025-09-04 LAB — HBA1C MFR BLD: 10.3 %

## 2025-09-04 RX ORDER — BLOOD-GLUCOSE SENSOR
1 EACH MISCELLANEOUS
Qty: 5 EACH | Refills: 3 | Status: SHIPPED | OUTPATIENT
Start: 2025-09-04 | End: 2025-09-04 | Stop reason: ALTCHOICE

## 2025-09-04 RX ORDER — BLOOD-GLUCOSE SENSOR
1 EACH MISCELLANEOUS
Qty: 6 EACH | Refills: 3 | Status: SHIPPED | OUTPATIENT
Start: 2025-09-04 | End: 2026-09-04

## 2025-09-04 RX ORDER — SEMAGLUTIDE 0.68 MG/ML
0.5 INJECTION, SOLUTION SUBCUTANEOUS
Qty: 9 ML | Refills: 0 | Status: SHIPPED | OUTPATIENT
Start: 2025-09-04

## 2025-09-05 DIAGNOSIS — E11.40 TYPE 2 DIABETES MELLITUS WITH DIABETIC NEUROPATHY, WITHOUT LONG-TERM CURRENT USE OF INSULIN: ICD-10-CM

## 2025-09-05 DIAGNOSIS — I10 ESSENTIAL HYPERTENSION: ICD-10-CM

## 2025-09-05 DIAGNOSIS — E78.00 HYPERCHOLESTEROLEMIA: ICD-10-CM

## 2025-09-06 RX ORDER — ENALAPRIL MALEATE 20 MG/1
20 TABLET ORAL
Qty: 100 TABLET | Refills: 0 | Status: SHIPPED | OUTPATIENT
Start: 2025-09-06

## 2025-09-06 RX ORDER — ROSUVASTATIN CALCIUM 20 MG/1
20 TABLET, COATED ORAL
Qty: 100 TABLET | Refills: 3 | Status: SHIPPED | OUTPATIENT
Start: 2025-09-06

## 2025-09-06 RX ORDER — METFORMIN HYDROCHLORIDE 1000 MG/1
1000 TABLET ORAL 2 TIMES DAILY WITH MEALS
Qty: 200 TABLET | Refills: 0 | Status: SHIPPED | OUTPATIENT
Start: 2025-09-06

## 2025-09-06 RX ORDER — AMLODIPINE BESYLATE 10 MG/1
10 TABLET ORAL
Qty: 100 TABLET | Refills: 3 | Status: SHIPPED | OUTPATIENT
Start: 2025-09-06